# Patient Record
Sex: MALE | Race: WHITE | Employment: UNEMPLOYED | URBAN - NONMETROPOLITAN AREA
[De-identification: names, ages, dates, MRNs, and addresses within clinical notes are randomized per-mention and may not be internally consistent; named-entity substitution may affect disease eponyms.]

---

## 2017-03-13 ENCOUNTER — HOSPITAL ENCOUNTER (OUTPATIENT)
Dept: OTHER | Age: 47
Discharge: HOME | End: 2017-03-31
Attending: PREVENTIVE MEDICINE | Admitting: PREVENTIVE MEDICINE

## 2017-03-16 ENCOUNTER — HOSPITAL ENCOUNTER (OUTPATIENT)
Dept: OTHER | Age: 47
Discharge: HOME | End: 2017-03-16
Attending: FAMILY MEDICINE | Admitting: FAMILY MEDICINE

## 2017-04-01 ENCOUNTER — HOSPITAL ENCOUNTER (OUTPATIENT)
Dept: OTHER | Age: 47
Discharge: HOME | End: 2017-04-30
Attending: PREVENTIVE MEDICINE | Admitting: PREVENTIVE MEDICINE

## 2019-01-30 PROBLEM — I82.419 DVT FEMORAL (DEEP VENOUS THROMBOSIS) (HCC): Status: ACTIVE | Noted: 2019-01-30

## 2019-01-30 PROBLEM — I73.9 PAD (PERIPHERAL ARTERY DISEASE) (HCC): Status: ACTIVE | Noted: 2019-01-30

## 2019-03-08 ENCOUNTER — HOSPITAL ENCOUNTER (OUTPATIENT)
Dept: CARDIAC CATH/INVASIVE PROCEDURES | Age: 49
Discharge: HOME | End: 2019-03-08
Attending: SURGERY | Admitting: SURGERY
Payer: MEDICAID

## 2019-03-08 VITALS
DIASTOLIC BLOOD PRESSURE: 121 MMHG | WEIGHT: 147 LBS | TEMPERATURE: 98.9 F | RESPIRATION RATE: 16 BRPM | BODY MASS INDEX: 21.05 KG/M2 | OXYGEN SATURATION: 97 % | HEART RATE: 90 BPM | HEIGHT: 70 IN | SYSTOLIC BLOOD PRESSURE: 151 MMHG

## 2019-03-08 LAB
APTT: 28.7 SECONDS (ref 21.2–33)
BUN BLDV-MCNC: 9 MG/DL (ref 6–23)
CREAT SERPL-MCNC: 1 MG/DL (ref 0.9–1.3)
GFR AFRICAN AMERICAN: >60 ML/MIN/1.73M2
GFR NON-AFRICAN AMERICAN: >60 ML/MIN/1.73M2
INR BLD: 1.1 INDEX
PROTHROMBIN TIME: 12.8 SECONDS (ref 9.12–12.5)

## 2019-03-08 PROCEDURE — 82565 ASSAY OF CREATININE: CPT

## 2019-03-08 PROCEDURE — C1894 INTRO/SHEATH, NON-LASER: HCPCS

## 2019-03-08 PROCEDURE — 6370000000 HC RX 637 (ALT 250 FOR IP): Performed by: SURGERY

## 2019-03-08 PROCEDURE — 6360000004 HC RX CONTRAST MEDICATION

## 2019-03-08 PROCEDURE — 2580000003 HC RX 258: Performed by: SURGERY

## 2019-03-08 PROCEDURE — 94761 N-INVAS EAR/PLS OXIMETRY MLT: CPT

## 2019-03-08 PROCEDURE — C1887 CATHETER, GUIDING: HCPCS

## 2019-03-08 PROCEDURE — 6360000002 HC RX W HCPCS

## 2019-03-08 PROCEDURE — C1769 GUIDE WIRE: HCPCS

## 2019-03-08 PROCEDURE — 75625 CONTRAST EXAM ABDOMINL AORTA: CPT

## 2019-03-08 PROCEDURE — 36247 INS CATH ABD/L-EXT ART 3RD: CPT

## 2019-03-08 PROCEDURE — 85730 THROMBOPLASTIN TIME PARTIAL: CPT

## 2019-03-08 PROCEDURE — 85610 PROTHROMBIN TIME: CPT

## 2019-03-08 PROCEDURE — 84520 ASSAY OF UREA NITROGEN: CPT

## 2019-03-08 PROCEDURE — 2500000003 HC RX 250 WO HCPCS

## 2019-03-08 PROCEDURE — 75710 ARTERY X-RAYS ARM/LEG: CPT

## 2019-03-08 PROCEDURE — 2709999900 HC NON-CHARGEABLE SUPPLY

## 2019-03-08 RX ORDER — DIAZEPAM 5 MG/1
5 TABLET ORAL ONCE
Status: COMPLETED | OUTPATIENT
Start: 2019-03-08 | End: 2019-03-08

## 2019-03-08 RX ORDER — LISINOPRIL 5 MG/1
5 TABLET ORAL DAILY
COMMUNITY
End: 2019-07-29

## 2019-03-08 RX ORDER — DIPHENHYDRAMINE HCL 25 MG
50 TABLET ORAL ONCE
Status: COMPLETED | OUTPATIENT
Start: 2019-03-08 | End: 2019-03-08

## 2019-03-08 RX ORDER — SODIUM CHLORIDE 9 MG/ML
INJECTION, SOLUTION INTRAVENOUS CONTINUOUS
Status: DISCONTINUED | OUTPATIENT
Start: 2019-03-08 | End: 2019-03-08 | Stop reason: HOSPADM

## 2019-03-08 RX ORDER — ACETAMINOPHEN 325 MG/1
650 TABLET ORAL EVERY 4 HOURS PRN
Status: DISCONTINUED | OUTPATIENT
Start: 2019-03-08 | End: 2019-03-08 | Stop reason: HOSPADM

## 2019-03-08 RX ADMIN — SODIUM CHLORIDE: 9 INJECTION, SOLUTION INTRAVENOUS at 08:29

## 2019-03-08 RX ADMIN — DIAZEPAM 5 MG: 5 TABLET ORAL at 08:29

## 2019-03-08 RX ADMIN — DIPHENHYDRAMINE HCL 50 MG: 25 TABLET ORAL at 08:29

## 2019-07-29 ENCOUNTER — APPOINTMENT (OUTPATIENT)
Dept: GENERAL RADIOLOGY | Age: 49
End: 2019-07-29
Payer: MEDICAID

## 2019-07-29 ENCOUNTER — APPOINTMENT (OUTPATIENT)
Dept: CT IMAGING | Age: 49
End: 2019-07-29
Payer: MEDICAID

## 2019-07-29 ENCOUNTER — HOSPITAL ENCOUNTER (EMERGENCY)
Age: 49
Discharge: HOME | End: 2019-07-29
Attending: EMERGENCY MEDICINE
Payer: MEDICAID

## 2019-07-29 VITALS
HEIGHT: 70 IN | DIASTOLIC BLOOD PRESSURE: 103 MMHG | WEIGHT: 140 LBS | TEMPERATURE: 98.2 F | BODY MASS INDEX: 20.04 KG/M2 | RESPIRATION RATE: 18 BRPM | HEART RATE: 88 BPM | OXYGEN SATURATION: 92 % | SYSTOLIC BLOOD PRESSURE: 134 MMHG

## 2019-07-29 DIAGNOSIS — J90 PLEURAL EFFUSION ON LEFT: ICD-10-CM

## 2019-07-29 DIAGNOSIS — S27.321A CONTUSION OF LEFT LUNG, INITIAL ENCOUNTER: ICD-10-CM

## 2019-07-29 DIAGNOSIS — R91.1 PULMONARY NODULE: ICD-10-CM

## 2019-07-29 DIAGNOSIS — S09.90XA CLOSED HEAD INJURY, INITIAL ENCOUNTER: ICD-10-CM

## 2019-07-29 DIAGNOSIS — S22.42XA CLOSED FRACTURE OF MULTIPLE RIBS OF LEFT SIDE, INITIAL ENCOUNTER: Primary | ICD-10-CM

## 2019-07-29 LAB
ALBUMIN SERPL-MCNC: 4 GM/DL (ref 3.4–5)
ALP BLD-CCNC: 81 IU/L (ref 40–129)
ALT SERPL-CCNC: 12 U/L (ref 10–40)
ANION GAP SERPL CALCULATED.3IONS-SCNC: 10 MMOL/L (ref 4–16)
AST SERPL-CCNC: 21 IU/L (ref 15–37)
BASOPHILS ABSOLUTE: 0.1 K/CU MM
BASOPHILS RELATIVE PERCENT: 0.7 % (ref 0–1)
BILIRUB SERPL-MCNC: 0.5 MG/DL (ref 0–1)
BUN BLDV-MCNC: 10 MG/DL (ref 6–23)
CALCIUM SERPL-MCNC: 8.7 MG/DL (ref 8.3–10.6)
CHLORIDE BLD-SCNC: 99 MMOL/L (ref 99–110)
CO2: 24 MMOL/L (ref 21–32)
CREAT SERPL-MCNC: 0.8 MG/DL (ref 0.9–1.3)
DIFFERENTIAL TYPE: ABNORMAL
EOSINOPHILS ABSOLUTE: 0.2 K/CU MM
EOSINOPHILS RELATIVE PERCENT: 1.3 % (ref 0–3)
GFR AFRICAN AMERICAN: >60 ML/MIN/1.73M2
GFR NON-AFRICAN AMERICAN: >60 ML/MIN/1.73M2
GLUCOSE BLD-MCNC: 100 MG/DL (ref 70–99)
HCT VFR BLD CALC: 45.4 % (ref 42–52)
HEMOGLOBIN: 14.8 GM/DL (ref 13.5–18)
IMMATURE NEUTROPHIL %: 0.5 % (ref 0–0.43)
LYMPHOCYTES ABSOLUTE: 1.6 K/CU MM
LYMPHOCYTES RELATIVE PERCENT: 11.8 % (ref 24–44)
MCH RBC QN AUTO: 28.2 PG (ref 27–31)
MCHC RBC AUTO-ENTMCNC: 32.6 % (ref 32–36)
MCV RBC AUTO: 86.6 FL (ref 78–100)
MONOCYTES ABSOLUTE: 0.9 K/CU MM
MONOCYTES RELATIVE PERCENT: 6.7 % (ref 0–4)
NUCLEATED RBC %: 0 %
PDW BLD-RTO: 15.9 % (ref 11.7–14.9)
PLATELET # BLD: 283 K/CU MM (ref 140–440)
PMV BLD AUTO: 9.8 FL (ref 7.5–11.1)
POTASSIUM SERPL-SCNC: 4.9 MMOL/L (ref 3.5–5.1)
RBC # BLD: 5.24 M/CU MM (ref 4.6–6.2)
SEGMENTED NEUTROPHILS ABSOLUTE COUNT: 10.9 K/CU MM
SEGMENTED NEUTROPHILS RELATIVE PERCENT: 79 % (ref 36–66)
SODIUM BLD-SCNC: 133 MMOL/L (ref 135–145)
TOTAL IMMATURE NEUTOROPHIL: 0.07 K/CU MM
TOTAL NUCLEATED RBC: 0 K/CU MM
TOTAL PROTEIN: 6.5 GM/DL (ref 6.4–8.2)
WBC # BLD: 13.8 K/CU MM (ref 4–10.5)

## 2019-07-29 PROCEDURE — 6360000002 HC RX W HCPCS: Performed by: EMERGENCY MEDICINE

## 2019-07-29 PROCEDURE — 70450 CT HEAD/BRAIN W/O DYE: CPT

## 2019-07-29 PROCEDURE — 72125 CT NECK SPINE W/O DYE: CPT

## 2019-07-29 PROCEDURE — 85025 COMPLETE CBC W/AUTO DIFF WBC: CPT

## 2019-07-29 PROCEDURE — 80053 COMPREHEN METABOLIC PANEL: CPT

## 2019-07-29 PROCEDURE — 96376 TX/PRO/DX INJ SAME DRUG ADON: CPT

## 2019-07-29 PROCEDURE — 6370000000 HC RX 637 (ALT 250 FOR IP): Performed by: EMERGENCY MEDICINE

## 2019-07-29 PROCEDURE — 96374 THER/PROPH/DIAG INJ IV PUSH: CPT

## 2019-07-29 PROCEDURE — 74177 CT ABD & PELVIS W/CONTRAST: CPT

## 2019-07-29 PROCEDURE — 94761 N-INVAS EAR/PLS OXIMETRY MLT: CPT

## 2019-07-29 PROCEDURE — 94640 AIRWAY INHALATION TREATMENT: CPT

## 2019-07-29 PROCEDURE — 71046 X-RAY EXAM CHEST 2 VIEWS: CPT

## 2019-07-29 PROCEDURE — 99285 EMERGENCY DEPT VISIT HI MDM: CPT

## 2019-07-29 PROCEDURE — 6360000004 HC RX CONTRAST MEDICATION: Performed by: EMERGENCY MEDICINE

## 2019-07-29 PROCEDURE — 71260 CT THORAX DX C+: CPT

## 2019-07-29 RX ORDER — IPRATROPIUM BROMIDE AND ALBUTEROL SULFATE 2.5; .5 MG/3ML; MG/3ML
1 SOLUTION RESPIRATORY (INHALATION) ONCE
Status: COMPLETED | OUTPATIENT
Start: 2019-07-29 | End: 2019-07-29

## 2019-07-29 RX ORDER — MORPHINE SULFATE 4 MG/ML
4 INJECTION, SOLUTION INTRAMUSCULAR; INTRAVENOUS EVERY 30 MIN PRN
Status: DISCONTINUED | OUTPATIENT
Start: 2019-07-29 | End: 2019-07-29 | Stop reason: HOSPADM

## 2019-07-29 RX ADMIN — IPRATROPIUM BROMIDE AND ALBUTEROL SULFATE 1 AMPULE: .5; 3 SOLUTION RESPIRATORY (INHALATION) at 06:45

## 2019-07-29 RX ADMIN — IOPAMIDOL 80 ML: 755 INJECTION, SOLUTION INTRAVENOUS at 08:29

## 2019-07-29 RX ADMIN — MORPHINE SULFATE 4 MG: 4 INJECTION, SOLUTION INTRAMUSCULAR; INTRAVENOUS at 09:02

## 2019-07-29 RX ADMIN — MORPHINE SULFATE 4 MG: 4 INJECTION, SOLUTION INTRAMUSCULAR; INTRAVENOUS at 07:05

## 2019-07-29 ASSESSMENT — PAIN SCALES - GENERAL
PAINLEVEL_OUTOF10: 7
PAINLEVEL_OUTOF10: 10
PAINLEVEL_OUTOF10: 10
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
## 2021-04-29 ENCOUNTER — APPOINTMENT (OUTPATIENT)
Dept: ULTRASOUND IMAGING | Age: 51
DRG: 182 | End: 2021-04-29
Payer: MEDICAID

## 2021-04-29 ENCOUNTER — APPOINTMENT (OUTPATIENT)
Dept: GENERAL RADIOLOGY | Age: 51
DRG: 182 | End: 2021-04-29
Payer: MEDICAID

## 2021-04-29 ENCOUNTER — HOSPITAL ENCOUNTER (INPATIENT)
Age: 51
LOS: 2 days | Discharge: HOME | DRG: 182 | End: 2021-05-01
Attending: EMERGENCY MEDICINE | Admitting: INTERNAL MEDICINE
Payer: MEDICAID

## 2021-04-29 DIAGNOSIS — M79.89 LEG SWELLING: Primary | ICD-10-CM

## 2021-04-29 DIAGNOSIS — I82.4Y2 ACUTE DEEP VEIN THROMBOSIS (DVT) OF PROXIMAL VEIN OF LEFT LOWER EXTREMITY (HCC): ICD-10-CM

## 2021-04-29 PROBLEM — I82.402 DVT, LOWER EXTREMITY, RECURRENT, LEFT (HCC): Status: ACTIVE | Noted: 2021-04-29

## 2021-04-29 LAB
ALBUMIN SERPL-MCNC: 4.1 GM/DL (ref 3.4–5)
ALP BLD-CCNC: 116 IU/L (ref 40–128)
ALT SERPL-CCNC: 14 U/L (ref 10–40)
ANION GAP SERPL CALCULATED.3IONS-SCNC: 7 MMOL/L (ref 4–16)
APTT: 33.3 SECONDS (ref 25.1–37.1)
AST SERPL-CCNC: 20 IU/L (ref 15–37)
BASOPHILS ABSOLUTE: 0.1 K/CU MM
BASOPHILS RELATIVE PERCENT: 1 % (ref 0–1)
BILIRUB SERPL-MCNC: 0.6 MG/DL (ref 0–1)
BUN BLDV-MCNC: 7 MG/DL (ref 6–23)
CALCIUM SERPL-MCNC: 9.4 MG/DL (ref 8.3–10.6)
CHLORIDE BLD-SCNC: 96 MMOL/L (ref 99–110)
CO2: 29 MMOL/L (ref 21–32)
CREAT SERPL-MCNC: 1 MG/DL (ref 0.9–1.3)
DIFFERENTIAL TYPE: ABNORMAL
EOSINOPHILS ABSOLUTE: 0.4 K/CU MM
EOSINOPHILS RELATIVE PERCENT: 3.5 % (ref 0–3)
GFR AFRICAN AMERICAN: >60 ML/MIN/1.73M2
GFR NON-AFRICAN AMERICAN: >60 ML/MIN/1.73M2
GLUCOSE BLD-MCNC: 97 MG/DL (ref 70–99)
HCT VFR BLD CALC: 50.3 % (ref 42–52)
HEMOGLOBIN: 16.3 GM/DL (ref 13.5–18)
IMMATURE NEUTROPHIL %: 1.3 % (ref 0–0.43)
LACTATE: 1.2 MMOL/L (ref 0.4–2)
LYMPHOCYTES ABSOLUTE: 1.5 K/CU MM
LYMPHOCYTES RELATIVE PERCENT: 14 % (ref 24–44)
MCH RBC QN AUTO: 27.3 PG (ref 27–31)
MCHC RBC AUTO-ENTMCNC: 32.4 % (ref 32–36)
MCV RBC AUTO: 84.1 FL (ref 78–100)
MONOCYTES ABSOLUTE: 1.2 K/CU MM
MONOCYTES RELATIVE PERCENT: 11 % (ref 0–4)
NUCLEATED RBC %: 0 %
PDW BLD-RTO: 18.9 % (ref 11.7–14.9)
PLATELET # BLD: 426 K/CU MM (ref 140–440)
PMV BLD AUTO: 9.9 FL (ref 7.5–11.1)
POTASSIUM SERPL-SCNC: 5.5 MMOL/L (ref 3.5–5.1)
PRO-BNP: 136.5 PG/ML
RBC # BLD: 5.98 M/CU MM (ref 4.6–6.2)
SEGMENTED NEUTROPHILS ABSOLUTE COUNT: 7.5 K/CU MM
SEGMENTED NEUTROPHILS RELATIVE PERCENT: 69.2 % (ref 36–66)
SODIUM BLD-SCNC: 132 MMOL/L (ref 135–145)
TOTAL CK: 46 IU/L (ref 38–174)
TOTAL IMMATURE NEUTOROPHIL: 0.14 K/CU MM
TOTAL NUCLEATED RBC: 0 K/CU MM
TOTAL PROTEIN: 7.7 GM/DL (ref 6.4–8.2)
TSH HIGH SENSITIVITY: 1.25 UIU/ML (ref 0.27–4.2)
WBC # BLD: 10.8 K/CU MM (ref 4–10.5)

## 2021-04-29 PROCEDURE — 93971 EXTREMITY STUDY: CPT

## 2021-04-29 PROCEDURE — 85730 THROMBOPLASTIN TIME PARTIAL: CPT

## 2021-04-29 PROCEDURE — 71045 X-RAY EXAM CHEST 1 VIEW: CPT

## 2021-04-29 PROCEDURE — 82550 ASSAY OF CK (CPK): CPT

## 2021-04-29 PROCEDURE — 6370000000 HC RX 637 (ALT 250 FOR IP): Performed by: INTERNAL MEDICINE

## 2021-04-29 PROCEDURE — 6370000000 HC RX 637 (ALT 250 FOR IP): Performed by: EMERGENCY MEDICINE

## 2021-04-29 PROCEDURE — 93005 ELECTROCARDIOGRAM TRACING: CPT | Performed by: EMERGENCY MEDICINE

## 2021-04-29 PROCEDURE — 83605 ASSAY OF LACTIC ACID: CPT

## 2021-04-29 PROCEDURE — 84443 ASSAY THYROID STIM HORMONE: CPT

## 2021-04-29 PROCEDURE — 83880 ASSAY OF NATRIURETIC PEPTIDE: CPT

## 2021-04-29 PROCEDURE — 85027 COMPLETE CBC AUTOMATED: CPT

## 2021-04-29 PROCEDURE — 1200000000 HC SEMI PRIVATE

## 2021-04-29 PROCEDURE — 2580000003 HC RX 258: Performed by: INTERNAL MEDICINE

## 2021-04-29 PROCEDURE — G0378 HOSPITAL OBSERVATION PER HR: HCPCS

## 2021-04-29 PROCEDURE — 96366 THER/PROPH/DIAG IV INF ADDON: CPT

## 2021-04-29 PROCEDURE — 2580000003 HC RX 258: Performed by: EMERGENCY MEDICINE

## 2021-04-29 PROCEDURE — 85025 COMPLETE CBC W/AUTO DIFF WBC: CPT

## 2021-04-29 PROCEDURE — 6360000002 HC RX W HCPCS: Performed by: EMERGENCY MEDICINE

## 2021-04-29 PROCEDURE — 80053 COMPREHEN METABOLIC PANEL: CPT

## 2021-04-29 PROCEDURE — 96365 THER/PROPH/DIAG IV INF INIT: CPT

## 2021-04-29 PROCEDURE — 99285 EMERGENCY DEPT VISIT HI MDM: CPT

## 2021-04-29 PROCEDURE — 36415 COLL VENOUS BLD VENIPUNCTURE: CPT

## 2021-04-29 RX ORDER — OXYCODONE HYDROCHLORIDE AND ACETAMINOPHEN 5; 325 MG/1; MG/1
1 TABLET ORAL EVERY 6 HOURS PRN
Status: DISCONTINUED | OUTPATIENT
Start: 2021-04-29 | End: 2021-04-29

## 2021-04-29 RX ORDER — 0.9 % SODIUM CHLORIDE 0.9 %
1000 INTRAVENOUS SOLUTION INTRAVENOUS ONCE
Status: COMPLETED | OUTPATIENT
Start: 2021-04-29 | End: 2021-04-29

## 2021-04-29 RX ORDER — HEPARIN SODIUM 1000 [USP'U]/ML
40 INJECTION, SOLUTION INTRAVENOUS; SUBCUTANEOUS PRN
Status: DISCONTINUED | OUTPATIENT
Start: 2021-04-30 | End: 2021-05-01

## 2021-04-29 RX ORDER — SODIUM CHLORIDE 0.9 % (FLUSH) 0.9 %
5-40 SYRINGE (ML) INJECTION PRN
Status: DISCONTINUED | OUTPATIENT
Start: 2021-04-29 | End: 2021-05-01 | Stop reason: HOSPADM

## 2021-04-29 RX ORDER — OXYCODONE HYDROCHLORIDE AND ACETAMINOPHEN 5; 325 MG/1; MG/1
1 TABLET ORAL EVERY 4 HOURS PRN
Status: DISCONTINUED | OUTPATIENT
Start: 2021-04-29 | End: 2021-05-01 | Stop reason: HOSPADM

## 2021-04-29 RX ORDER — POLYETHYLENE GLYCOL 3350 17 G/17G
17 POWDER, FOR SOLUTION ORAL DAILY PRN
Status: DISCONTINUED | OUTPATIENT
Start: 2021-04-29 | End: 2021-05-01 | Stop reason: HOSPADM

## 2021-04-29 RX ORDER — PROMETHAZINE HYDROCHLORIDE 25 MG/1
12.5 TABLET ORAL EVERY 6 HOURS PRN
Status: DISCONTINUED | OUTPATIENT
Start: 2021-04-29 | End: 2021-05-01 | Stop reason: HOSPADM

## 2021-04-29 RX ORDER — HEPARIN SODIUM 10000 [USP'U]/100ML
5-30 INJECTION, SOLUTION INTRAVENOUS CONTINUOUS
Status: DISCONTINUED | OUTPATIENT
Start: 2021-04-29 | End: 2021-04-30

## 2021-04-29 RX ORDER — SODIUM CHLORIDE 0.9 % (FLUSH) 0.9 %
5-40 SYRINGE (ML) INJECTION EVERY 12 HOURS SCHEDULED
Status: DISCONTINUED | OUTPATIENT
Start: 2021-04-29 | End: 2021-05-01 | Stop reason: HOSPADM

## 2021-04-29 RX ORDER — ACETAMINOPHEN 325 MG/1
650 TABLET ORAL EVERY 6 HOURS PRN
Status: DISCONTINUED | OUTPATIENT
Start: 2021-04-29 | End: 2021-05-01 | Stop reason: HOSPADM

## 2021-04-29 RX ORDER — LISINOPRIL 5 MG/1
5 TABLET ORAL DAILY
Status: DISCONTINUED | OUTPATIENT
Start: 2021-04-29 | End: 2021-05-01 | Stop reason: HOSPADM

## 2021-04-29 RX ORDER — LISINOPRIL 5 MG/1
5 TABLET ORAL DAILY
Status: ON HOLD | COMMUNITY
End: 2021-05-01 | Stop reason: SDUPTHER

## 2021-04-29 RX ORDER — SODIUM CHLORIDE 9 MG/ML
25 INJECTION, SOLUTION INTRAVENOUS PRN
Status: DISCONTINUED | OUTPATIENT
Start: 2021-04-29 | End: 2021-05-01 | Stop reason: HOSPADM

## 2021-04-29 RX ORDER — HEPARIN SODIUM 1000 [USP'U]/ML
80 INJECTION, SOLUTION INTRAVENOUS; SUBCUTANEOUS ONCE
Status: COMPLETED | OUTPATIENT
Start: 2021-04-29 | End: 2021-04-29

## 2021-04-29 RX ORDER — ACETAMINOPHEN 650 MG/1
650 SUPPOSITORY RECTAL EVERY 6 HOURS PRN
Status: DISCONTINUED | OUTPATIENT
Start: 2021-04-29 | End: 2021-05-01 | Stop reason: HOSPADM

## 2021-04-29 RX ORDER — HYDROCODONE BITARTRATE AND ACETAMINOPHEN 5; 325 MG/1; MG/1
1 TABLET ORAL ONCE
Status: COMPLETED | OUTPATIENT
Start: 2021-04-29 | End: 2021-04-29

## 2021-04-29 RX ORDER — HEPARIN SODIUM 1000 [USP'U]/ML
80 INJECTION, SOLUTION INTRAVENOUS; SUBCUTANEOUS PRN
Status: DISCONTINUED | OUTPATIENT
Start: 2021-04-30 | End: 2021-05-01

## 2021-04-29 RX ORDER — ONDANSETRON 2 MG/ML
4 INJECTION INTRAMUSCULAR; INTRAVENOUS EVERY 6 HOURS PRN
Status: DISCONTINUED | OUTPATIENT
Start: 2021-04-29 | End: 2021-05-01 | Stop reason: HOSPADM

## 2021-04-29 RX ADMIN — HYDROCODONE BITARTRATE AND ACETAMINOPHEN 1 TABLET: 5; 325 TABLET ORAL at 18:21

## 2021-04-29 RX ADMIN — SODIUM CHLORIDE, PRESERVATIVE FREE 10 ML: 5 INJECTION INTRAVENOUS at 21:35

## 2021-04-29 RX ADMIN — SODIUM CHLORIDE 1000 ML: 9 INJECTION, SOLUTION INTRAVENOUS at 17:41

## 2021-04-29 RX ADMIN — HEPARIN SODIUM AND DEXTROSE 18 UNITS/KG/HR: 10000; 5 INJECTION INTRAVENOUS at 19:35

## 2021-04-29 RX ADMIN — LISINOPRIL 5 MG: 5 TABLET ORAL at 21:35

## 2021-04-29 RX ADMIN — HEPARIN SODIUM 5620 UNITS: 1000 INJECTION INTRAVENOUS; SUBCUTANEOUS at 19:29

## 2021-04-29 ASSESSMENT — PAIN DESCRIPTION - ORIENTATION: ORIENTATION: LEFT

## 2021-04-29 ASSESSMENT — PAIN SCALES - GENERAL: PAINLEVEL_OUTOF10: 10

## 2021-04-29 ASSESSMENT — ENCOUNTER SYMPTOMS
ALLERGIC/IMMUNOLOGIC NEGATIVE: 1
GASTROINTESTINAL NEGATIVE: 1
EYES NEGATIVE: 1
RESPIRATORY NEGATIVE: 1

## 2021-04-29 ASSESSMENT — PAIN - FUNCTIONAL ASSESSMENT: PAIN_FUNCTIONAL_ASSESSMENT: PREVENTS OR INTERFERES SOME ACTIVE ACTIVITIES AND ADLS

## 2021-04-29 ASSESSMENT — PAIN DESCRIPTION - FREQUENCY: FREQUENCY: CONTINUOUS

## 2021-04-29 ASSESSMENT — PAIN DESCRIPTION - DESCRIPTORS: DESCRIPTORS: CONSTANT;DULL;THROBBING

## 2021-04-29 NOTE — ED PROVIDER NOTES
Onset    Diabetes Father     Kidney Disease Father      Social History     Socioeconomic History    Marital status:      Spouse name: Not on file    Number of children: 1    Years of education: Not on file    Highest education level: Not on file   Occupational History    Occupation: Manger/cook   Social Needs    Financial resource strain: Not on file    Food insecurity     Worry: Not on file     Inability: Not on file   [employer] needs     Medical: Not on file     Non-medical: Not on file   Tobacco Use    Smoking status: Current Every Day Smoker     Packs/day: 1.00     Types: Cigarettes    Smokeless tobacco: Never Used    Tobacco comment: Hx of 3 PK of cigarettes daily   Substance and Sexual Activity    Alcohol use: Yes     Comment: social drinking     Drug use: No    Sexual activity: Not on file   Lifestyle    Physical activity     Days per week: Not on file     Minutes per session: Not on file    Stress: Not on file   Relationships    Social connections     Talks on phone: Not on file     Gets together: Not on file     Attends Worship service: Not on file     Active member of club or organization: Not on file     Attends meetings of clubs or organizations: Not on file     Relationship status: Not on file    Intimate partner violence     Fear of current or ex partner: Not on file     Emotionally abused: Not on file     Physically abused: Not on file     Forced sexual activity: Not on file   Other Topics Concern    Not on file   Social History Narrative    Not on file     Current Facility-Administered Medications   Medication Dose Route Frequency Provider Last Rate Last Admin    0.9 % sodium chloride bolus  1,000 mL Intravenous Once Rin Croissant, DO 1,000 mL/hr at 04/29/21 1741 1,000 mL at 04/29/21 1741    heparin (porcine) injection 5,620 Units  80 Units/kg Intravenous Once Rin Croissant, DO        [START ON 4/30/2021] heparin (porcine) injection 5,620 Units  80 Units/kg Intravenous PRN Tiburcio Yip, DO        heparin (porcine) injection 2,810 Units  40 Units/kg Intravenous PRN Tiburcio Yip, DO        heparin 25,000 units in dextrose 5% 250 mL (premix) infusion  5-30 Units/kg/hr Intravenous Continuous Tiburcio Yip, DO         No current outpatient medications on file. No Known Allergies  Current Facility-Administered Medications   Medication Dose Route Frequency Provider Last Rate Last Admin    0.9 % sodium chloride bolus  1,000 mL Intravenous Once Tiburcio Yip, DO 1,000 mL/hr at 04/29/21 1741 1,000 mL at 04/29/21 1741    heparin (porcine) injection 5,620 Units  80 Units/kg Intravenous Once Tiburcio Yip, DO        [START ON 4/30/2021] heparin (porcine) injection 5,620 Units  80 Units/kg Intravenous PRN Tiburcio Yip, DO        heparin (porcine) injection 2,810 Units  40 Units/kg Intravenous PRN Tiburcio Yip, DO        heparin 25,000 units in dextrose 5% 250 mL (premix) infusion  5-30 Units/kg/hr Intravenous Continuous Tiburcio Yip, DO         No current outpatient medications on file. Nursing Notes Reviewed    VITAL SIGNS:  ED Triage Vitals [04/29/21 1406]   Enc Vitals Group      BP (!) 147/103      Pulse 107      Resp 19      Temp 98 °F (36.7 °C)      Temp src       SpO2 100 %      Weight 155 lb (70.3 kg)      Height 5' 10\" (1.778 m)      Head Circumference       Peak Flow       Pain Score       Pain Loc       Pain Edu? Excl. in [address]? PHYSICAL EXAM:  Physical Exam  Vitals signs and nursing note reviewed. Constitutional:       General: He is not in acute distress. Appearance: Normal appearance. He is well-developed and well-groomed. He is not ill-appearing, toxic-appearing or diaphoretic. HENT:      Head: Normocephalic and atraumatic. Right Ear: External ear normal.      Left Ear: External ear normal.      Nose: No congestion or rhinorrhea. Eyes:      General: No scleral icterus.         Right eye: No activity. Coordination: Coordination is intact. Coordination normal.   Psychiatric:         Mood and Affect: Mood normal.         Behavior: Behavior normal. Behavior is cooperative. Thought Content:  Thought content normal.         Judgment: Judgment normal.           I have reviewed andinterpreted all of the currently available lab results from this visit (if applicable):    Results for orders placed or performed during the hospital encounter of 04/29/21   CBC Auto Differential   Result Value Ref Range    WBC 10.8 (H) 4.0 - 10.5 K/CU MM    RBC 5.98 4.6 - 6.2 M/CU MM    Hemoglobin 16.3 13.5 - 18.0 GM/DL    Hematocrit 50.3 42 - 52 %    MCV 84.1 78 - 100 FL    MCH 27.3 27 - 31 PG    MCHC 32.4 32.0 - 36.0 %    RDW 18.9 (H) 11.7 - 14.9 %    Platelets 468 148 - 623 K/CU MM    MPV 9.9 7.5 - 11.1 FL    Differential Type AUTOMATED DIFFERENTIAL     Segs Relative 69.2 (H) 36 - 66 %    Lymphocytes % 14.0 (L) 24 - 44 %    Monocytes % 11.0 (H) 0 - 4 %    Eosinophils % 3.5 (H) 0 - 3 %    Basophils % 1.0 0 - 1 %    Segs Absolute 7.5 K/CU MM    Lymphocytes Absolute 1.5 K/CU MM    Monocytes Absolute 1.2 K/CU MM    Eosinophils Absolute 0.4 K/CU MM    Basophils Absolute 0.1 K/CU MM    Nucleated RBC % 0.0 %    Total Nucleated RBC 0.0 K/CU MM    Total Immature Neutrophil 0.14 K/CU MM    Immature Neutrophil % 1.3 (H) 0 - 0.43 %   CMP   Result Value Ref Range    Sodium 132 (L) 135 - 145 MMOL/L    Potassium 5.5 (HH) 3.5 - 5.1 MMOL/L    Chloride 96 (L) 99 - 110 mMol/L    CO2 29 21 - 32 MMOL/L    BUN 7 6 - 23 MG/DL    CREATININE 1.0 0.9 - 1.3 MG/DL    Glucose 97 70 - 99 MG/DL    Calcium 9.4 8.3 - 10.6 MG/DL    Albumin 4.1 3.4 - 5.0 GM/DL    Total Protein 7.7 6.4 - 8.2 GM/DL    Total Bilirubin 0.6 0.0 - 1.0 MG/DL    ALT 14 10 - 40 U/L    AST 20 15 - 37 IU/L    Alkaline Phosphatase 116 40 - 128 IU/L    GFR Non-African American >60 >60 mL/min/1.73m2    GFR African American >60 >60 mL/min/1.73m2    Anion Gap 7 4 - 16   CK   Result recognition program. Efforts were made to edit the dictations but occasionally words aremis-transcribed.)    DISPOSITION REFERRAL (if applicable):  No follow-up provider specified.     DISPOSITION MEDICATIONS (if applicable):  New Prescriptions    No medications on file          Irineo Garcia, [facility],   04/29/21 182

## 2021-04-29 NOTE — H&P
HISTORY AND PHYSICAL  (Hospitalist, Internal Medicine)  IDENTIFYING INFORMATION   PATIENT:  Terrie Flores  MRN:  [id number]  ADMIT DATE: 4/29/2021      CHIEF COMPLAINT   Left lower extremity swelling and pain    HISTORY OF PRESENT ILLNESS   Terrie Flores is a 53 y.o. male with h/o recurrent DVT, h/o PE, HTN, neurofibromatosis, PVD, chronic tobacco dependence presented to ED with c/o worsening swelling, pain and redness in left lower extremity for the last 3 to 4 days. Patient reports that he had history of DVT in the same leg, had PE. Patient was on Eliquis when he was diagnosed with PE but self discontinued. Patient reported that he never saw hematology/oncology. Patient denied any chest pain, shortness of breath, palpitations. Denied any fever, chills, cough, denied any visible bleeding, denied any melena, bright red blood per rectum. At presentation patient was noted to have /103, , RR 19, temperature 98, saturating 100% on room air. Lab work significant for sodium 132, potassium 5.5, hemoglobin 16.3. Chest x-ray-bilateral midlung linear opacities. Venous Doppler-extensive occlusive thrombus left common femoral vein, greater saphenous vein and deep femoral vein, partially occluding thrombus left femoral vein extending into the popliteal vein. Patient started on heparin drip in ER.     PAST MEDICAL HISTORY PAST SURGICAL HISTORY   Hypertension, recurrent DVT, PE, PVD, neurofibromatosis, history of herpes zoster ophthalmicus    FAMILY HISTORY SOCIAL HISTORY    Reviewed and noncontributory   admits to smoking 2 to 3 cigarettes/day, quit alcohol more than a year ago, denied any illicit drug abuse   MEDICATIONS ALLERGIES    Lisinopril   no known drug allergies       PAST MEDICAL, SURGICAL, FAMILY, and SOCIAL HISTORY         Past Medical History:   Diagnosis Date    Acute pancreatitis 10/19/2012    admitted to [facility], but left AMA    Alcohol abuse     Depression     H/O cardiovascular stress test 6/18/2012, 12/27/2010 6/18/2012-Normal Myocardial Perfusion Study. Post stress myocardial perfusion images show a normal pattern of perfusion in all regions. Post stress LV is normal size and function. Normal perfusion in distribution of all coronaries. EF 65%.  H/O echocardiogram 6/21/2012 6/21/2012-LV normal size. Normal LV wall thickness. LV systolic function normal. EF=>55%. LV wall motion normal. The atrial septal defect is small.     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   Social Needs    Financial resource strain: None    Food insecurity     Worry: None     Inability: None    Transportation needs     Medical: None     Non-medical: None   Tobacco Use    Smoking status: Current Every Day Smoker     Packs/day: 1.00     Types: Cigarettes    Smokeless tobacco: Never Used    Tobacco comment: Hx of 3 PK of cigarettes daily   Substance and Sexual Activity    Alcohol use: Yes     Comment: social drinking     Drug use: No    Sexual activity: None   Lifestyle    Physical activity     Days per week: None     Minutes per session: None    Stress: None   Relationships    Social connections     Talks on phone: None     Gets together: None     Attends [employer] service: None     Active member of club or organization: None     Attends meetings of clubs or organizations: None     Relationship status: None    Intimate partner violence     Fear of current or ex partner: None     Emotionally abused: None     Physically abused: None     Forced sexual activity: None   Other Topics Concern    None   Social History Narrative    None       MEDICATIONS   Medications Prior to Admission  Not in a hospital admission. Current Medications  Current Facility-Administered Medications   Medication Dose Route Frequency Provider Last Rate Last Admin    heparin (porcine) injection 5,620 Units  80 Units/kg Intravenous Once Ashish River DO        [START ON 4/30/2021] heparin (porcine) injection 5,620 Units  80 Units/kg Intravenous PRN Ashish River DO        [START ON 4/30/2021] heparin (porcine) injection 2,810 Units  40 Units/kg Intravenous PRN Ashish River DO        heparin 25,000 units in dextrose 5% 250 mL (premix) infusion  5-30 Units/kg/hr Intravenous Continuous Kanu Crenshaw DO         No current outpatient medications on file. Allergies  No Known Allergies    REVIEW OF SYSTEMS   Within above limitations. 14 point review of systems reviewed. Pertinent positive or negative as per HPI or otherwise negative per 14 point systems review. PHYSICAL EXAM     Wt Readings from Last 3 Encounters:   04/29/21 155 lb (70.3 kg)   07/29/19 140 lb (63.5 kg)   04/03/19 143 lb 3.2 oz (65 kg)       Blood pressure (!) 147/103, pulse 107, temperature 98 °F (36.7 °C), resp. rate 19, height 5' 10\" (1.778 m), weight 155 lb (70.3 kg), SpO2 100 %. GEN  -Awake, alert, NAD.   EYES   -PERRL. HENT  -MM are moist.   RESP  -LS CTA equal bilat, no wheezes, rales or rhonchi. Symmetric chest movement. No respiratory distress noted. C/V  -S1/S2 auscultated, tachycardia without appreciable M/R/G. No JVD or carotid bruits. Peripheral pulses -difficult to palpate. No reproducible chest wall tenderness. GI  -Abdomen is soft non-distended, no significant tenderness. No masses or guarding. + BS in all quadrants. Rectal exam deferred.      -No CVA tenderness. Murphy catheter is not present. MS  -swelling LLE > RLE, left lower extremity swelling noted up to the knee, erythematous, warm to touch, tender to palpate  SKIN  -Normal coloration, warm, dry, tiny papular skin lesions throughout entire body including face, plantar surface of feet. NEURO  - Awake, alert, oriented x 3, no focal deficits. PSYC  - Appropriate affect. LABS AND IMAGING     Results for Erica Short (MRN [id number]) as of 4/29/2021 23:08   Ref.  Range 4/29/2021 17:00   Sodium Latest Ref Range: 135 - 145 MMOL/L 132 (L)   Potassium Latest Ref Range: 3.5 - 5.1 MMOL/L 5.5 (HH)   Chloride Latest Ref Range: 99 - 110 mMol/L 96 (L)   CO2 Latest Ref Range: 21 - 32 MMOL/L 29   BUN Latest Ref Range: 6 - 23 MG/DL 7   Creatinine Latest Ref Range: 0.9 - 1.3 MG/DL 1.0   Anion Gap Latest Ref Range: 4 - 16  7   GFR Non- Latest Ref Range: >60 mL/min/1.73m2 >60   GFR African American Latest Ref Range: >60 mL/min/1.73m2 >60   Lactate, ser/plas Latest Ref Range: 0.4 - 2.0 mMOL/L 1.2   Glucose Latest Ref Range: 70 - 99 MG/DL 97   Calcium Latest Ref Range: 8.3 - 10.6 MG/DL 9.4   Total Protein Latest Ref Range: 6.4 - 8.2 GM/DL 7.7   Total CK Latest Ref Range: 38 - 174 IU/L 46   Pro-BNP Latest Ref Range: <300 PG/.5   Albumin Latest Ref Range: 3.4 - 5.0 GM/DL 4.1   Alk Phos Latest Ref Range: 40 - 128 IU/L 116   ALT Latest Ref Range: 10 - 40 U/L 14   AST Latest Ref Range: 15 - 37 IU/L 20   Bilirubin Latest Ref Range: 0.0 - 1.0 MG/DL 0.6   TSH, High Sensitivity Latest Ref Range: 0.270 - 4.20 uIu/ml 1.250   WBC Latest Ref Range: 4.0 - 10.5 K/CU MM 10.8 (H)   RBC Latest Ref Range: 4.6 - 6.2 M/CU MM 5.98   Hemoglobin Quant Latest Ref Range: 13.5 - 18.0 GM/DL 16.3   Hematocrit Latest Ref Range: 42 - 52 % 50.3   MCV Latest Ref Range: 78 - 100 FL 84.1   MCH Latest Ref Range: 27 - 31 PG 27.3   MCHC Latest Ref Range: 32.0 - 36.0 % 32.4   MPV Latest Ref Range: 7.5 - 11.1 FL 9.9   RDW Latest Ref Range: 11.7 - 14.9 % 18.9 (H)   Platelet Count Latest Ref Range: 140 - 440 K/CU    Lymphocyte % Latest Ref Range: 24 - 44 % 14.0 (L)   Monocytes % Latest Ref Range: 0 - 4 % 11.0 (H)   Eosinophils % Latest Ref Range: 0 - 3 % 3.5 (H)   Basophils % Latest Ref Range: 0 - 1 % 1.0   Lymphocytes Absolute Latest Units: K/CU MM 1.5   Monocytes Absolute Latest Units: K/CU MM 1.2   Eosinophils Absolute Latest Units: K/CU MM 0.4   Basophils Absolute Latest Units: K/CU MM 0.1   Differential Type Unknown AUTOMATED DIFFERENTIAL   Segs Relative Latest Ref Range: 36 - 66 % 69.2 (H)   Segs Absolute Latest Units: K/CU MM 7.5   Nucleated RBC % Latest Units: % 0.0   Immature Neutrophil % Latest Ref Range: 0 - 0.43 % 1.3 (H)   Total Immature Neutrophil Latest Units: K/CU MM 0.14   Total Nucleated RBC Latest Units: K/CU MM 0.0   aPTT Latest Ref Range: 25.1 - 37.1 SECONDS 33.3     Recent Imaging    VL DUP LOWER EXTREMITY VENOUS LEFT [[id number]] Collected: 04/29/21 1821      Order Status: Completed Updated: 04/29/21 1826     Narrative:       EXAMINATION:   DUPLEX VENOUS ULTRASOUND OF THE LEFT LOWER EXTREMITY     4/29/2021 5:42 pm     TECHNIQUE:   Duplex ultrasound using B-mode/gray scaled imaging and Doppler spectral   analysis and color flow was obtained of the left lower extremity. COMPARISON:   None.      HISTORY:   ORDERING SYSTEM PROVIDED HISTORY: pain/swelling   TECHNOLOGIST PROVIDED HISTORY:   Reason for exam:->pain/swelling   Reason for Exam: Lt leg pain, swelling, and redness   Type of Exam: Initial     FINDINGS:   Extensive occlusive thrombus seen in the left common femoral vein, greater   saphenous vein and deep femoral vein.  In addition, there is partial   occluding thrombus throughout the left femoral vein extending into the   popliteal vein.  Calf veins are free of thrombus.      Impression:       Extensive occlusive thrombus left common femoral vein, greater saphenous vein   and deep femoral vein     Partial occluding thrombus left femoral vein extending into the popliteal vein      XR CHEST PORTABLE [[id number]] Collected: 04/29/21 1727     Order Status: Completed Updated: 04/29/21 1732     Narrative:       EXAMINATION:   ONE XRAY VIEW OF THE CHEST     4/29/2021 5:16 pm     COMPARISON:   07/29/2019 chest CT     HISTORY:   ORDERING SYSTEM PROVIDED HISTORY: leg swelling   TECHNOLOGIST PROVIDED HISTORY:   Reason for exam:->leg swelling   Reason for Exam: leg swelling     FINDINGS:   The heart size is stable.  Bilateral midlung linear opacities.  No   pneumothorax.  No pleural effusion.      Impression:       Bilateral midlung linear opacities suggesting scarring or atelectasis     Otherwise, no acute cardiopulmonary process          Relevant labs and imaging reviewed    ASSESSMENT AND PLAN     #.  DVT in left lower extremity:  -Venous Doppler-extensive occlusive thrombus left common femoral vein, greater saphenous vein and deep femoral vein, partially occluding thrombus left femoral vein extending into the popliteal vein.    -Continue heparin drip   -Hematology/oncology consultED. -2D echo ordered     #. h/o recurrent DVT  -LLE-acute DVT of left common femoral vein, popliteal vein, peroneal vein-10/2018    #. h/o PE-10/2018-acute segmental and subsegmental right lung pulmonary emboli. #. HTN-patient noncompliant with medications  -Continue lisinopril. #.  Neurofibromatosis-biopsy-proven    #. PVD  -Patient has seen Dr. Bruce Staley 4/2019-lost for follow-up    #. COPD :  -CT chest-4/22/2016-moderate upper lung predominant centrilobular and paraseptal emphysema    #. Lung mass-as per care everywhere-left upper lobe lung nodule-measuring up to 2.9 cm-lesion suspicious for primary bronchogenic carcinoma. S/p biopsy-histoplasmosis. #. Chronic tobacco dependence  -Patient reports that he cut down to 2 to 3 cigarettes/day.       DVT Prophylaxis: On heparin drip for acute DVT  GI Prophylaxis: Not indicated  Code Status: FULL.       Case d/w ED physician    Praveena Padgett MD  Hospitalist, Internal Medicine  4/29/2021 at 6:56 PM

## 2021-04-30 PROBLEM — I82.4Y2 ACUTE DEEP VEIN THROMBOSIS (DVT) OF PROXIMAL VEIN OF LEFT LOWER EXTREMITY (HCC): Status: ACTIVE | Noted: 2021-04-29

## 2021-04-30 LAB
ACTIVATED CLOTTING TIME, LOW RANGE: 256 SEC
ANION GAP SERPL CALCULATED.3IONS-SCNC: 7 MMOL/L (ref 4–16)
APTT: 29.5 SECONDS (ref 25.1–37.1)
APTT: 38.4 SECONDS (ref 25.1–37.1)
APTT: 39.1 SECONDS (ref 25.1–37.1)
BACTERIA: NEGATIVE /HPF
BASOPHILS ABSOLUTE: 0.1 K/CU MM
BASOPHILS RELATIVE PERCENT: 1.1 % (ref 0–1)
BILIRUBIN URINE: NEGATIVE MG/DL
BLOOD, URINE: NEGATIVE
BUN BLDV-MCNC: 8 MG/DL (ref 6–23)
CALCIUM SERPL-MCNC: 9.3 MG/DL (ref 8.3–10.6)
CHLORIDE BLD-SCNC: 99 MMOL/L (ref 99–110)
CLARITY: CLEAR
CO2: 27 MMOL/L (ref 21–32)
COLOR: YELLOW
CREAT SERPL-MCNC: 0.9 MG/DL (ref 0.9–1.3)
DIFFERENTIAL TYPE: ABNORMAL
EKG ATRIAL RATE: 103 BPM
EKG DIAGNOSIS: NORMAL
EKG P AXIS: 41 DEGREES
EKG P-R INTERVAL: 140 MS
EKG Q-T INTERVAL: 340 MS
EKG QRS DURATION: 92 MS
EKG QTC CALCULATION (BAZETT): 445 MS
EKG R AXIS: 8 DEGREES
EKG T AXIS: 31 DEGREES
EKG VENTRICULAR RATE: 103 BPM
EOSINOPHILS ABSOLUTE: 0.4 K/CU MM
EOSINOPHILS RELATIVE PERCENT: 4 % (ref 0–3)
GFR AFRICAN AMERICAN: >60 ML/MIN/1.73M2
GFR NON-AFRICAN AMERICAN: >60 ML/MIN/1.73M2
GLUCOSE BLD-MCNC: 98 MG/DL (ref 70–99)
GLUCOSE, URINE: NEGATIVE MG/DL
HCT VFR BLD CALC: 48.8 % (ref 42–52)
HEMOGLOBIN: 15.2 GM/DL (ref 13.5–18)
HYALINE CASTS: 0 /LPF
IMMATURE NEUTROPHIL %: 1.3 % (ref 0–0.43)
KETONES, URINE: ABNORMAL MG/DL
LEUKOCYTE ESTERASE, URINE: NEGATIVE
LV EF: 53 %
LVEF MODALITY: NORMAL
LYMPHOCYTES ABSOLUTE: 1.4 K/CU MM
LYMPHOCYTES RELATIVE PERCENT: 15.2 % (ref 24–44)
MCH RBC QN AUTO: 26.3 PG (ref 27–31)
MCHC RBC AUTO-ENTMCNC: 31.1 % (ref 32–36)
MCV RBC AUTO: 84.3 FL (ref 78–100)
MONOCYTES ABSOLUTE: 1 K/CU MM
MONOCYTES RELATIVE PERCENT: 10.9 % (ref 0–4)
MUCUS: ABNORMAL HPF
NITRITE URINE, QUANTITATIVE: NEGATIVE
NUCLEATED RBC %: 0 %
PDW BLD-RTO: 18.7 % (ref 11.7–14.9)
PH, URINE: 6 (ref 5–8)
PLATELET # BLD: 386 K/CU MM (ref 140–440)
PMV BLD AUTO: 9.6 FL (ref 7.5–11.1)
POTASSIUM SERPL-SCNC: 4.5 MMOL/L (ref 3.5–5.1)
PROTEIN UA: NEGATIVE MG/DL
RBC # BLD: 5.79 M/CU MM (ref 4.6–6.2)
RBC URINE: ABNORMAL /HPF (ref 0–3)
SARS-COV-2, NAAT: NOT DETECTED
SEGMENTED NEUTROPHILS ABSOLUTE COUNT: 6.2 K/CU MM
SEGMENTED NEUTROPHILS RELATIVE PERCENT: 67.5 % (ref 36–66)
SODIUM BLD-SCNC: 133 MMOL/L (ref 135–145)
SOURCE: NORMAL
SPECIFIC GRAVITY UA: 1.01 (ref 1–1.03)
TOTAL IMMATURE NEUTOROPHIL: 0.12 K/CU MM
TOTAL NUCLEATED RBC: 0 K/CU MM
TRICHOMONAS: ABNORMAL /HPF
UROBILINOGEN, URINE: 2 MG/DL (ref 0.2–1)
WBC # BLD: 9.2 K/CU MM (ref 4–10.5)
WBC UA: <1 /HPF (ref 0–2)

## 2021-04-30 PROCEDURE — 36012 PLACE CATHETER IN VEIN: CPT

## 2021-04-30 PROCEDURE — 80048 BASIC METABOLIC PNL TOTAL CA: CPT

## 2021-04-30 PROCEDURE — 6370000000 HC RX 637 (ALT 250 FOR IP): Performed by: PHYSICIAN ASSISTANT

## 2021-04-30 PROCEDURE — 99254 IP/OBS CNSLTJ NEW/EST MOD 60: CPT | Performed by: INTERNAL MEDICINE

## 2021-04-30 PROCEDURE — 2709999900 HC NON-CHARGEABLE SUPPLY

## 2021-04-30 PROCEDURE — 6360000004 HC RX CONTRAST MEDICATION

## 2021-04-30 PROCEDURE — 6370000000 HC RX 637 (ALT 250 FOR IP): Performed by: INTERNAL MEDICINE

## 2021-04-30 PROCEDURE — 067N3ZZ DILATION OF LEFT FEMORAL VEIN, PERCUTANEOUS APPROACH: ICD-10-PCS | Performed by: INTERNAL MEDICINE

## 2021-04-30 PROCEDURE — 37249 TRLUML BALO ANGIOP ADDL VEIN: CPT

## 2021-04-30 PROCEDURE — 85347 COAGULATION TIME ACTIVATED: CPT

## 2021-04-30 PROCEDURE — 86147 CARDIOLIPIN ANTIBODY EA IG: CPT

## 2021-04-30 PROCEDURE — 6370000000 HC RX 637 (ALT 250 FOR IP)

## 2021-04-30 PROCEDURE — 99222 1ST HOSP IP/OBS MODERATE 55: CPT | Performed by: INTERNAL MEDICINE

## 2021-04-30 PROCEDURE — 067D3ZZ DILATION OF LEFT COMMON ILIAC VEIN, PERCUTANEOUS APPROACH: ICD-10-PCS | Performed by: INTERNAL MEDICINE

## 2021-04-30 PROCEDURE — 2500000003 HC RX 250 WO HCPCS

## 2021-04-30 PROCEDURE — 6360000002 HC RX W HCPCS

## 2021-04-30 PROCEDURE — 36415 COLL VENOUS BLD VENIPUNCTURE: CPT

## 2021-04-30 PROCEDURE — 85730 THROMBOPLASTIN TIME PARTIAL: CPT

## 2021-04-30 PROCEDURE — 87635 SARS-COV-2 COVID-19 AMP PRB: CPT

## 2021-04-30 PROCEDURE — 93306 TTE W/DOPPLER COMPLETE: CPT

## 2021-04-30 PROCEDURE — 93010 ELECTROCARDIOGRAM REPORT: CPT | Performed by: INTERNAL MEDICINE

## 2021-04-30 PROCEDURE — 81241 F5 GENE: CPT

## 2021-04-30 PROCEDURE — G0378 HOSPITAL OBSERVATION PER HR: HCPCS

## 2021-04-30 PROCEDURE — 81001 URINALYSIS AUTO W/SCOPE: CPT

## 2021-04-30 PROCEDURE — 36000 PLACE NEEDLE IN VEIN: CPT

## 2021-04-30 PROCEDURE — 75822 VEIN X-RAY ARMS/LEGS: CPT

## 2021-04-30 PROCEDURE — C1725 CATH, TRANSLUMIN NON-LASER: HCPCS

## 2021-04-30 PROCEDURE — 86146 BETA-2 GLYCOPROTEIN ANTIBODY: CPT

## 2021-04-30 PROCEDURE — 37248 TRLUML BALO ANGIOP 1ST VEIN: CPT | Performed by: INTERNAL MEDICINE

## 2021-04-30 PROCEDURE — C1769 GUIDE WIRE: HCPCS

## 2021-04-30 PROCEDURE — 37248 TRLUML BALO ANGIOP 1ST VEIN: CPT

## 2021-04-30 PROCEDURE — 2140000000 HC CCU INTERMEDIATE R&B

## 2021-04-30 PROCEDURE — 96366 THER/PROPH/DIAG IV INF ADDON: CPT

## 2021-04-30 PROCEDURE — 36005 INJECTION EXT VENOGRAPHY: CPT

## 2021-04-30 PROCEDURE — 94761 N-INVAS EAR/PLS OXIMETRY MLT: CPT

## 2021-04-30 PROCEDURE — 85025 COMPLETE CBC W/AUTO DIFF WBC: CPT

## 2021-04-30 PROCEDURE — 75825 VEIN X-RAY TRUNK: CPT | Performed by: INTERNAL MEDICINE

## 2021-04-30 PROCEDURE — 36005 INJECTION EXT VENOGRAPHY: CPT | Performed by: INTERNAL MEDICINE

## 2021-04-30 PROCEDURE — C1894 INTRO/SHEATH, NON-LASER: HCPCS

## 2021-04-30 PROCEDURE — 2580000003 HC RX 258: Performed by: INTERNAL MEDICINE

## 2021-04-30 PROCEDURE — 81240 F2 GENE: CPT

## 2021-04-30 PROCEDURE — C1887 CATHETER, GUIDING: HCPCS

## 2021-04-30 RX ADMIN — OXYCODONE HYDROCHLORIDE AND ACETAMINOPHEN 1 TABLET: 5; 325 TABLET ORAL at 06:53

## 2021-04-30 RX ADMIN — SODIUM CHLORIDE, PRESERVATIVE FREE 10 ML: 5 INJECTION INTRAVENOUS at 11:20

## 2021-04-30 RX ADMIN — RIVAROXABAN 15 MG: 15 TABLET, FILM COATED ORAL at 17:56

## 2021-04-30 RX ADMIN — LISINOPRIL 5 MG: 5 TABLET ORAL at 20:13

## 2021-04-30 RX ADMIN — OXYCODONE HYDROCHLORIDE AND ACETAMINOPHEN 1 TABLET: 5; 325 TABLET ORAL at 19:57

## 2021-04-30 ASSESSMENT — PAIN DESCRIPTION - ORIENTATION: ORIENTATION: LEFT

## 2021-04-30 ASSESSMENT — PAIN DESCRIPTION - DESCRIPTORS: DESCRIPTORS: CONSTANT;DULL;THROBBING

## 2021-04-30 ASSESSMENT — PAIN - FUNCTIONAL ASSESSMENT: PAIN_FUNCTIONAL_ASSESSMENT: PREVENTS OR INTERFERES SOME ACTIVE ACTIVITIES AND ADLS

## 2021-04-30 ASSESSMENT — PAIN SCALES - GENERAL: PAINLEVEL_OUTOF10: 8

## 2021-04-30 ASSESSMENT — PAIN DESCRIPTION - FREQUENCY: FREQUENCY: CONTINUOUS

## 2021-04-30 NOTE — PROGRESS NOTES
[address] [place] [address]  HOSPITALIST PROGRESS NOTE                       Name:  Jessica Royal /Age/Sex: 1970  (53 y.o. male)   MRN & CSN:  [id number] & [id number] Admission Date/Time: 2021  4:49 PM   [facility] Attending:  Mk Arce MD                                                  HPI  Jessica Royal is a 53 y.o. male who presented with LLE edema    SUBJECTIVE  Complains of pain and swelling of LLE- with difficulty moving the leg due to swelling and pain, no shortness of breath/chest pain. 10 point review of systems reviewed and negative unless noted above. ALLERGIES: No Known Allergies    PCP: TITUS Lancaster CNP    PAST MEDICAL HISTORY, SURGICAL HISTORY, SOCIAL HISTORY and  HOME MEDICATIONS all reviewed. OBJECTIVE  Vitals:    21 1945 21 0645 21 0827   BP:  (!) 158/99 (!) 147/95    Pulse:  84 97    Resp:  18 18 20   Temp:  98 °F (36.7 °C) 98.1 °F (36.7 °C)    TempSrc:  Oral Oral    SpO2: 92% 91% 90% 91%   Weight:  166 lb 12.8 oz (75.7 kg)     Height:  5' 10\" (1.778 m)         PHYSICAL EXAM   GEN Awake male, sitting upright in bed in no apparent distress. EYES Pupils are equally round. No scleral erythema, discharge, or conjunctivitis. HENT Mucous membranes are moist. Oral pharynx without exudates, no evidence of thrush. NECK Supple, no apparent thyromegaly or masses. RESP Clear to auscultation, no wheezes, rales or rhonchi. Symmetric chest movement  CARDIO/VASC S1/S2 auscultated. Regular rate without appreciable murmurs, rubs, or gallops. No JVD or carotid bruits. Peripheral pulses equal bilaterally and palpable. Positive peripheral edema. GI Abdomen is soft without significant tenderness, masses, or guarding. Bowel sounds are normoactive. Rectal exam deferred.  No costovertebral angle tenderness. Normal appearing external genitalia. HEME/LYMPH No palpable cervical lymphadenopathy and no hepatosplenomegaly.  No petechiae or ecchymoses. MSK LLE edema and limited ROM  NEURO Cranial nerves appear grossly intact, normal speech, no lateralizing weakness. PSYCH Awake, alert, oriented x 4. Affect appropriate. INTAKE: In: 142.5 [I.V.:142.5]  Out: 650   OUTPUT: In: 142.5   Out: 650 [Urine:650]    LABS  Recent Labs     04/29/21  1700   WBC 10.8*   HGB 16.3   HCT 50.3         Recent Labs     04/29/21  1700   *   K 5.5*   CL 96*   CO2 29   BUN 7   CREATININE 1.0     Recent Labs     04/29/21  1700   AST 20   ALT 14   BILITOT 0.6   ALKPHOS 116     No results for input(s): INR in the last 72 hours. Recent Labs     04/29/21  1700   CKTOTAL 46          Abnormal labs for today      Imaging:     ECHO:    Microbiology:  Blood culture:    Urine culture:    Sputum culture:    Procedures done this admission:    MEDS  Scheduled Meds:   sodium chloride flush  5-40 mL Intravenous 2 times per day    lisinopril  5 mg Oral Daily     Continuous Infusions:   heparin (PORCINE) Infusion 18 Units/kg/hr (04/29/21 1935)    sodium chloride       PRN Meds:heparin (porcine), heparin (porcine), sodium chloride flush, sodium chloride, promethazine **OR** ondansetron, polyethylene glycol, acetaminophen **OR** acetaminophen, oxyCODONE-acetaminophen        ASSESSMENT and PLAN  Hospital Day: 2    1-Symptomatic extensive LLE DVT- in the setting of previous DVT- has not been taking eliquis which he was supposed to be on(could not get refill)- evaluation for catheter-directed thrombolysis  -on heparin drip- once thrombolysis is complete- then will witch to oral agent. Echo pending.     Other issues  -Hx of PE/DVT  -HTN  -Neurofibromatosis  -PVF  -COPD  -Lung mass- oncology consulted this hospitalization  -chronic tobacco use           Disp:     Diet DIET LOW SODIUM 2 GM;   DVT Prophylaxis [] Lovenox, []  Heparin, [] SCDs, [] Ambulation   GI Prophylaxis [] PPI,  [] H2 Blocker,  [] Carafate,  [] Diet/Tube Feeds   Code Status Full Code   Disposition Patient requires continued admission due to symptomatic DVT   CMS Level of Risk [] Low, [] Moderate,[x]  High  Patient's risk as above due to symptomatic DVT     RAÚL CHAUDHRY MD 4/30/2021 8:31 AM

## 2021-04-30 NOTE — CONSULTS
Hematology/Oncology  Consult Note      Reason for Consult:  Recurrent DVT  Requesting Physician:  Dr. Yousif Gomez:   Chief Complaint   Patient presents with    Leg Swelling     left leg swelling and pain 3 days     History Obtained From:  patient, electronic medical record    HISTORY OF PRESENT ILLNESS:      The patient is a 53 y.o. male with significant past medical history of acute pancreatitis, HTN, smoker, ETOH abuse, PAD, Femoral DVT who presents with leg swelling x 3 days. Venous doppler 4/29/21  Extensive occlusive thrombus left common femoral vein, greater saphenous vein   and deep femoral vein       Partial occluding thrombus left femoral vein extending into the popliteal vein     He was started on heparin gtt. Cardiology has evaluated, echo pending. with planned thrombolysis. He is noted to have had previous DVT and reports multiple PE in 2015. He has not followed up with refilling Eliquis. He reports long distance travel often. He denies recent injury, instrumentation, no testosterone use  He formerly was a patient of . Tong Jeffries, TITUS - CNP   Reports he no longer follows with her  He denies pervious cancer history  He does have known lung nodules since 2019- largest of which was 1.6 x 0.9; he reports he was unaware of this  He denies family history of cancer  Reports swelling to leg occurred after he got his COVID vaccine    Due to recurrent DVT we were called to evaluate    Past Medical History:        Diagnosis Date    Acute pancreatitis 10/19/2012    admitted to [facility], but left AMA    Alcohol abuse     Depression     H/O cardiovascular stress test 6/18/2012, 12/27/2010 6/18/2012-Normal Myocardial Perfusion Study. Post stress myocardial perfusion images show a normal pattern of perfusion in all regions. Post stress LV is normal size and function. Normal perfusion in distribution of all coronaries. EF 65%.     H/O echocardiogram 6/21/2012 6/21/2012-LV normal size. Normal LV wall thickness. LV systolic function normal. EF=>55%. LV wall motion normal. The atrial septal defect is small.  History of complete ECG 6/18/2012, 1/6/2012, 12/27/2011    Hx of blood clots     Hypertension     Non compliance with medical treatment      Past Surgical History:        Procedure Laterality Date    SKIN BIOPSY         Current Medications:    Current Facility-Administered Medications: heparin (porcine) injection 5,620 Units, 80 Units/kg, Intravenous, PRN  heparin (porcine) injection 2,810 Units, 40 Units/kg, Intravenous, PRN  heparin 25,000 units in dextrose 5% 250 mL (premix) infusion, 5-30 Units/kg/hr, Intravenous, Continuous  sodium chloride flush 0.9 % injection 5-40 mL, 5-40 mL, Intravenous, 2 times per day  sodium chloride flush 0.9 % injection 5-40 mL, 5-40 mL, Intravenous, PRN  0.9 % sodium chloride infusion, 25 mL, Intravenous, PRN  promethazine (PHENERGAN) tablet 12.5 mg, 12.5 mg, Oral, Q6H PRN **OR** ondansetron (ZOFRAN) injection 4 mg, 4 mg, Intravenous, Q6H PRN  polyethylene glycol (GLYCOLAX) packet 17 g, 17 g, Oral, Daily PRN  acetaminophen (TYLENOL) tablet 650 mg, 650 mg, Oral, Q6H PRN **OR** acetaminophen (TYLENOL) suppository 650 mg, 650 mg, Rectal, Q6H PRN  lisinopril (PRINIVIL;ZESTRIL) tablet 5 mg, 5 mg, Oral, Daily  oxyCODONE-acetaminophen (PERCOCET) 5-325 MG per tablet 1 tablet, 1 tablet, Oral, Q4H PRN    Allergies:  Patient has no known allergies. Social History:    TOBACCO:   reports that he has been smoking cigarettes. He has been smoking about 1.00 pack per day. He has never used smokeless tobacco.  ETOH:   reports current alcohol use. DRUGS:   reports no history of drug use.   Family History:       Problem Relation Age of Onset    Diabetes Father     Kidney Disease Father      REVIEW OF SYSTEMS:    Denies headache, dizziness, visual changes  Denies chest pain, shortness of breath  Denies abdominal pain, changes to stools  +LLE swelling, pain    PHYSICAL EXAM:      Vitals:    BP (!) 147/95   Pulse 97   Temp 98.1 °F (36.7 °C) (Oral)   Resp 18   Ht 5' 10\" (1.778 m)   Wt 166 lb 12.8 oz (75.7 kg)   SpO2 90%   BMI 23.93 kg/m²     CONSTITUTIONAL:  awake, alert, cooperative, no apparent distress, and appears stated age  EYES:  Lids and lashes normal, extra ocular muscles intact, sclera clear, conjunctiva normal  LUNGS:  No increased work of breathing, good air exchange, clear to auscultation bilaterally, no crackles or wheezing  CARDIOVASCULAR:  regular rate and rhythm, normal S1 and S2, no S3 or S4, and no murmur noted  ABDOMEN: normal bowel sounds, soft, non-distended, non-tender, no masses palpated, no hepatosplenomegally  MUSCULOSKELETAL:  LLE swelling, redness  NEUROLOGIC:  Awake, alert, oriented to name, place and time. Cranial nerves II-XII are grossly intact. SKIN:  normal skin color, texture, turgor      IMPRESSION/RECOMMENDATIONS:      Recurrent DVT- now on heparin gtt. Plan for thrombolysis. Plan to switch to oral agent after procedure. He will need indefinite anticoagulation unless there is contraindication. We will obtain hypercoag workup today. Lung nodules- plan to repeat CT chest today    Neurofibromatosis- discussed genetic counseling    We will continue to follow. He will need to follow up in office for results of hypercoag panel. He needs assistance to connect with new PCP. I saw and examined patient myself. Agreed with above. We discussed about adherence to medication. Thank you for allowing us to participate in the care of this patient.         Electronically signed by TITUS Izquierdo CNP on 4/30/2021 at 7:43 AM

## 2021-04-30 NOTE — PROGRESS NOTES
Left leg venous angioplasty performed, start xarelto 15mg po bid for 21 days today and then 20mg daily

## 2021-04-30 NOTE — PLAN OF CARE
Problem: Pain:  Goal: Pain level will decrease  Description: Pain level will decrease  Outcome: Ongoing  Goal: Control of acute pain  Description: Control of acute pain  Outcome: Ongoing  Goal: Control of chronic pain  Description: Control of chronic pain  Outcome: Ongoing     Problem: Venous Thromboembolism:  Goal: Will show no signs or symptoms of venous thromboembolism  Description: Will show no signs or symptoms of venous thromboembolism  Outcome: Ongoing  Goal: Absence of signs or symptoms of impaired coagulation  Description: Absence of signs or symptoms of impaired coagulation  Outcome: Ongoing     Problem: Safety:  Goal: Free from accidental physical injury  Description: Free from accidental physical injury  Outcome: Ongoing  Goal: Free from intentional harm  Description: Free from intentional harm  Outcome: Ongoing     Problem: Daily Care:  Goal: Daily care needs are met  Description: Daily care needs are met  Outcome: Ongoing     Problem: Discharge Planning:  Goal: Patients continuum of care needs are met  Description: Patients continuum of care needs are met  Outcome: Ongoing

## 2021-04-30 NOTE — CONSULTS
H/o PE, HTN admitted with LLE swelling with DVT  Cont anticoag  Check echo  Will FU  Full note to follow                      Name:  Bibi Tubbs /Age/Sex: 1970  (53 y.o. male)   MRN & CSN:  [id number] & [id number] Admission Date/Time: 2021  4:49 PM   Location:  [address] [place], [place] [address] PCP: Serjio New, [address] [facility] Day: 2          Referring physician:  Osvaldo Michael MD         Reason for consultation:  DVT        Thanks for referral.    Information source: patient    CC;  SWELLING LLE      HPI:   Thank you for involving me in taking  care of Bibi Tubbs who  is a 53 y. o.year  Old male  Presents with  H/o DVT, PE, PVD , htn, noncompliance now admitted with severe LLE swelling, has a DVT         Extensive occlusive thrombus left common femoral vein, greater saphenous vein   and deep femoral vein       Partial occluding thrombus left femoral vein extending into the popliteal vein                 Past medical history:    has a past medical history of Acute pancreatitis, Alcohol abuse, Depression, H/O cardiovascular stress test, H/O echocardiogram, History of complete ECG, Hx of blood clots, Hypertension, and Non compliance with medical treatment. Past surgical history:   has a past surgical history that includes skin biopsy. Social History:   reports that he has been smoking cigarettes. He has been smoking about 1.00 pack per day. He has never used smokeless tobacco. He reports current alcohol use. He reports that he does not use drugs. Family history:  family history includes Diabetes in his father; Kidney Disease in his father.     No Known Allergies    heparin (porcine) injection 5,620 Units, PRN  heparin (porcine) injection 2,810 Units, PRN  heparin 25,000 units in dextrose 5% 250 mL (premix) infusion, Continuous  sodium chloride flush 0.9 % injection 5-40 mL, 2 times per day  sodium chloride flush 0.9 % injection 5-40 mL, PRN  0.9 % sodium chloride infusion, PRN  promethazine (PHENERGAN) tablet 12.5 mg, Q6H PRN    Or  ondansetron (ZOFRAN) injection 4 mg, Q6H PRN  polyethylene glycol (GLYCOLAX) packet 17 g, Daily PRN  acetaminophen (TYLENOL) tablet 650 mg, Q6H PRN    Or  acetaminophen (TYLENOL) suppository 650 mg, Q6H PRN  lisinopril (PRINIVIL;ZESTRIL) tablet 5 mg, Daily  oxyCODONE-acetaminophen (PERCOCET) 5-325 MG per tablet 1 tablet, Q4H PRN      Current Facility-Administered Medications   Medication Dose Route Frequency Provider Last Rate Last Admin    heparin (porcine) injection 5,620 Units  80 Units/kg Intravenous PRN Merlynn Neo, DO        heparin (porcine) injection 2,810 Units  40 Units/kg Intravenous PRN Merlynn Neo, DO        heparin 25,000 units in dextrose 5% 250 mL (premix) infusion  5-30 Units/kg/hr Intravenous Continuous Merlynn Neo, DO   Stopped at 04/30/21 1117    sodium chloride flush 0.9 % injection 5-40 mL  5-40 mL Intravenous 2 times per day Tarik Barr MD   10 mL at 04/30/21 1120    sodium chloride flush 0.9 % injection 5-40 mL  5-40 mL Intravenous PRN Tarik Barr MD        0.9 % sodium chloride infusion  25 mL Intravenous PRN Tarik Barr MD        promethazine (PHENERGAN) tablet 12.5 mg  12.5 mg Oral Q6H PRN Tarik Barr MD        Or    ondansetron (ZOFRAN) injection 4 mg  4 mg Intravenous Q6H PRN Tarik Barr MD        polyethylene glycol (GLYCOLAX) packet 17 g  17 g Oral Daily PRN Tarik Barr MD        acetaminophen (TYLENOL) tablet 650 mg  650 mg Oral Q6H PRN Tarik Barr MD        Or    acetaminophen (TYLENOL) suppository 650 mg  650 mg Rectal Q6H PRN Tarik Barr MD        lisinopril (PRINIVIL;ZESTRIL) tablet 5 mg  5 mg Oral Daily Tarik Barr MD   5 mg at 04/29/21 1901    oxyCODONE-acetaminophen (PERCOCET) 5-325 MG per tablet 1 tablet  1 tablet Oral Q4H PRN Jey Martínez PA-C   1 tablet at 04/30/21 9129     Review of Systems:  All 14 systems reviewed, all negative except for  lle swelling    Physical Examination:    /67   Pulse 77   Temp 98.1 °F (36.7 °C) (Oral)   Resp 14   Ht 5' 10\" (1.778 m)   Wt 166 lb 12.8 oz (75.7 kg)   SpO2 91%   BMI 23.93 kg/m²      Wt Readings from Last 3 Encounters:   04/29/21 166 lb 12.8 oz (75.7 kg)   07/29/19 140 lb (63.5 kg)   04/03/19 143 lb 3.2 oz (65 kg)     Body mass index is 23.93 kg/m². General Appearance:  fair  Head: normocephalic     Eyes: normal, noninjected conjunctiva    ENT: normal mucosa, noninjected throat, normal     NECK: No JVP  No thyromegaly        Cardiovascular: No thrills palpated   Auscultation: Normal S1 and S2,  no murmur   carotid bruit no   Abdominal Aorta no bruit    Respiratory:    Breath sounds Clear = 0    Extremities:      LLE  Edema clubbing ,   no cyanosis    SKIN: Warm and well perfused, no pallor or cyanosis    Vascular exam:  Pedal Pulses: pakp  bilaterally        Abdomen:  No masses or tenderness. No organomegaly noted. Neurological:  Oriented to time, place, and person   No focal neurological deficit noted. Psychiatric:normal mood, no anxiety    Lab Review   Recent Labs     04/30/21  0830   WBC 9.2   HGB 15.2   HCT 48.8         Recent Labs     04/30/21  0830   *   K 4.5   CL 99   CO2 27   BUN 8   CREATININE 0.9     Recent Labs     04/29/21  1700   AST 20   ALT 14   BILITOT 0.6   ALKPHOS 116     No results for input(s): TROPONINI in the last 72 hours. Lab Results   Component Value Date    BNP 44 01/12/2013    BNP 49 10/18/2012     Lab Results   Component Value Date    INR 1.10 03/08/2019    PROTIME 12.8 (H) 03/08/2019           Assessment/Recommendations:     - DVT; anticoag,  ?  Unprovoked DVT, will need longterm anticoag and TARANGO  - Dr Melissa Salazar will schedule thrombectomy  - compliance stressed         Jimmiepreeti Araujo MD, 4/30/2021 11:54 AM

## 2021-04-30 NOTE — PROGRESS NOTES
This RN called and spoke to patient's RN for Cath procedure and needs Rapid COVID sent. Order in. Verbalized understanding.

## 2021-05-01 ENCOUNTER — APPOINTMENT (OUTPATIENT)
Dept: CT IMAGING | Age: 51
DRG: 182 | End: 2021-05-01
Payer: MEDICAID

## 2021-05-01 VITALS
HEIGHT: 70 IN | DIASTOLIC BLOOD PRESSURE: 106 MMHG | OXYGEN SATURATION: 96 % | BODY MASS INDEX: 23.88 KG/M2 | SYSTOLIC BLOOD PRESSURE: 156 MMHG | WEIGHT: 166.8 LBS | HEART RATE: 81 BPM | TEMPERATURE: 98.2 F | RESPIRATION RATE: 20 BRPM

## 2021-05-01 LAB
APTT: 35.1 SECONDS (ref 25.1–37.1)
APTT: 37.3 SECONDS (ref 25.1–37.1)
HCT VFR BLD CALC: 45.7 % (ref 42–52)
HEMOGLOBIN: 14.3 GM/DL (ref 13.5–18)
MCH RBC QN AUTO: 26.4 PG (ref 27–31)
MCHC RBC AUTO-ENTMCNC: 31.3 % (ref 32–36)
MCV RBC AUTO: 84.3 FL (ref 78–100)
PDW BLD-RTO: 18.5 % (ref 11.7–14.9)
PLATELET # BLD: 360 K/CU MM (ref 140–440)
PMV BLD AUTO: 10 FL (ref 7.5–11.1)
RBC # BLD: 5.42 M/CU MM (ref 4.6–6.2)
WBC # BLD: 10.6 K/CU MM (ref 4–10.5)

## 2021-05-01 PROCEDURE — 85730 THROMBOPLASTIN TIME PARTIAL: CPT

## 2021-05-01 PROCEDURE — 85027 COMPLETE CBC AUTOMATED: CPT

## 2021-05-01 PROCEDURE — 36415 COLL VENOUS BLD VENIPUNCTURE: CPT

## 2021-05-01 PROCEDURE — 6370000000 HC RX 637 (ALT 250 FOR IP): Performed by: PHYSICIAN ASSISTANT

## 2021-05-01 PROCEDURE — G0378 HOSPITAL OBSERVATION PER HR: HCPCS

## 2021-05-01 PROCEDURE — 6370000000 HC RX 637 (ALT 250 FOR IP): Performed by: INTERNAL MEDICINE

## 2021-05-01 PROCEDURE — 71260 CT THORAX DX C+: CPT

## 2021-05-01 PROCEDURE — 94761 N-INVAS EAR/PLS OXIMETRY MLT: CPT

## 2021-05-01 PROCEDURE — 6360000004 HC RX CONTRAST MEDICATION: Performed by: NURSE PRACTITIONER

## 2021-05-01 RX ORDER — LISINOPRIL 5 MG/1
5 TABLET ORAL DAILY
Qty: 30 TABLET | Refills: 3 | Status: SHIPPED | OUTPATIENT
Start: 2021-05-01 | End: 2021-06-21

## 2021-05-01 RX ADMIN — RIVAROXABAN 15 MG: 15 TABLET, FILM COATED ORAL at 08:01

## 2021-05-01 RX ADMIN — OXYCODONE HYDROCHLORIDE AND ACETAMINOPHEN 1 TABLET: 5; 325 TABLET ORAL at 02:36

## 2021-05-01 RX ADMIN — LISINOPRIL 5 MG: 5 TABLET ORAL at 08:01

## 2021-05-01 RX ADMIN — IOPAMIDOL 80 ML: 755 INJECTION, SOLUTION INTRAVENOUS at 10:12

## 2021-05-01 RX ADMIN — OXYCODONE HYDROCHLORIDE AND ACETAMINOPHEN 1 TABLET: 5; 325 TABLET ORAL at 08:01

## 2021-05-01 ASSESSMENT — PAIN SCALES - GENERAL
PAINLEVEL_OUTOF10: 3
PAINLEVEL_OUTOF10: 7

## 2021-05-01 NOTE — DISCHARGE SUMMARY
Discharge Summary    Name:  Patricia Charles /Age/Sex: 1970  (53 y.o. male)   MRN & CSN:  [id number] & [id number] Admission Date/Time: 2021  4:49 PM   Attending:  Estefanía Valera MD Discharging Physician: Alison Hernandes MD     HPI and Hospital Course:   Patricia Charles is a 53 y.o.  male  who presented with lower extremity edema/pain    HPI-as per H and [address] 67  1-Symptomatic extensive LLE DVT- in the setting of previous DVT- has not been taking eliquis which he was supposed to be on(could not get refill)- treated with heparin drip, seen by cardio and left leg venous angioplasty done- today feels better with no significant pain and less swelling, tells me has ambulated and started on xarelto on which he is being discharge.     Other issues  -Hx of PE/DVT  -HTN  -Neurofibromatosis  -PVF  -COPD  -Lung mass- oncology consulted this hospitalization- CT done whose results pending- to have outpatient follow up  -chronic tobacco use            The patient expressed appropriate understanding of and agreement with the discharge recommendations, medications, and plan.      Consults this admission:  IP CONSULT TO HOSPITALIST  IP CONSULT TO Sonya Phipps  IP CONSULT TO CARDIOLOGY    Discharge Instruction:   Follow up appointments:   Primary care physician:  within 2 weeks    Diet:  General/cardiac/ADA/as tolerated  Activity: {discharge activity: as tolerated  Disposition: Discharged to:   [x]Home, [][facility], []SNF, []Acute Rehab, []Hospice   Condition on discharge: Stable    Discharge Medications:      Sabine Hoover   Home Medication Instructions [id number]    Printed on:21 1045   Medication Information                      lisinopril (PRINIVIL;ZESTRIL) 5 MG tablet  Take 1 tablet by mouth daily             rivaroxaban (XARELTO) 15 MG TABS tablet  Take 1 tablet by mouth 2 times daily (with meals)             rivaroxaban (XARELTO) 20 MG TABS tablet  Take 1 tablet by mouth daily (with breakfast) Once 15mg bid for 21 days is done                 Objective Findings at Discharge:   BP (!) 156/106   Pulse 81   Temp 98.2 °F (36.8 °C) (Oral)   Resp 20   Ht 5' 10\" (1.778 m)   Wt 166 lb 12.8 oz (75.7 kg)   SpO2 96%   BMI 23.93 kg/m²            PHYSICAL EXAM   GEN Awake male, laying in bed in no apparent distress. Appears given age. EYES Pupils are equally round. No scleral discharge  HENT Atraumatic and symmetric head  NECK No apparent thyromegaly  RESP Symmetric chest movement while on room air. CARDIO/VASC Peripheral pulses equal bilaterally and palpable. No peripheral edema. GI Abdomen is not distended. Rectal exam deferred.  Murphy catheter is not present. HEME/LYMPH No petechiae or ecchymoses. MSK Spontaneous movement of BL upper extremities  SKIN Normal coloration, warm, dry. NEURO Cranial nerves appear grossly intact  PSYCH Awake, alert.     BMP/CBC  Recent Labs     04/29/21  1700 04/30/21  0830 05/01/21  0642   * 133*  --    K 5.5* 4.5  --    CL 96* 99  --    CO2 29 27  --    BUN 7 8  --    CREATININE 1.0 0.9  --    WBC 10.8* 9.2 10.6*   HCT 50.3 48.8 45.7    386 360     SIGNIFICANT IMAGING AND LABS:      Discharge Time of 33 minutes    Electronically signed by Beba Gaxiola MD on 5/1/2021 at 10:45 AM

## 2021-05-01 NOTE — PLAN OF CARE
Problem: Pain:  Goal: Pain level will decrease  Description: Pain level will decrease  5/1/2021 0806 by Pao Arana RN  Outcome: Ongoing  5/1/2021 0152 by Francisca Ambrose RN  Outcome: Ongoing  Goal: Control of acute pain  Description: Control of acute pain  5/1/2021 0806 by Pao Arana RN  Outcome: Ongoing  5/1/2021 0152 by Francisca Ambrose RN  Outcome: Ongoing  Goal: Control of chronic pain  Description: Control of chronic pain  5/1/2021 0806 by Pao Arana RN  Outcome: Ongoing  5/1/2021 0152 by Francisca Ambrose RN  Outcome: Ongoing     Problem: Venous Thromboembolism:  Goal: Will show no signs or symptoms of venous thromboembolism  Description: Will show no signs or symptoms of venous thromboembolism  5/1/2021 0806 by Pao Arana RN  Outcome: Ongoing  5/1/2021 0152 by Francisca Ambrose RN  Outcome: Ongoing  Goal: Absence of signs or symptoms of impaired coagulation  Description: Absence of signs or symptoms of impaired coagulation  5/1/2021 0806 by Pao Arana RN  Outcome: Ongoing  5/1/2021 0152 by Francisca Ambrose RN  Outcome: Ongoing     Problem: Safety:  Goal: Free from accidental physical injury  Description: Free from accidental physical injury  5/1/2021 0806 by Pao Arana RN  Outcome: Ongoing  5/1/2021 0152 by Francisca Ambrose RN  Outcome: Ongoing  Goal: Free from intentional harm  Description: Free from intentional harm  5/1/2021 0806 by Pao Arana RN  Outcome: Ongoing  5/1/2021 0152 by Francisca Ambrose RN  Outcome: Ongoing     Problem: Daily Care:  Goal: Daily care needs are met  Description: Daily care needs are met  5/1/2021 0806 by Pao Arana RN  Outcome: Ongoing  5/1/2021 0152 by Francisca Ambrose RN  Outcome: Ongoing     Problem: Discharge Planning:  Goal: Patients continuum of care needs are met  Description: Patients continuum of care needs are met  5/1/2021 0806 by Pao Arana RN  Outcome: Ongoing  5/1/2021 0152 by Francisca Ambrose RN  Outcome: Ongoing

## 2021-05-05 LAB
ANTICARDIOLIPIN IGA ANTIBODY: 2 APL (ref 0–11)
ANTICARDIOLIPIN IGG ANTIBODY: 3 GPL (ref 0–14)
BETA 2 GLYCOPROT.1 IGA AB: 6 SAU (ref 0–20)
BETA 2 GLYCOPROT.1 IGM AB: 9 SMU (ref 0–20)
BETA-2 GLYCOPROTEIN 1 IGG ANTIBODY: 0 SGU (ref 0–20)
CARDIOLIPIN AB IGM: 13 MPL (ref 0–12)

## 2021-05-08 LAB — FACTOR V LEIDEN: NEGATIVE

## 2021-05-11 LAB — PROTHROMBIN G20210A MUTATION: NEGATIVE

## 2021-05-14 ENCOUNTER — TELEPHONE (OUTPATIENT)
Dept: ONCOLOGY | Age: 51
End: 2021-05-14

## 2021-05-14 NOTE — TELEPHONE ENCOUNTER
Pt called this morning to reschedule 10 am Appt. Had strict times and days he needed. I rescheduled for 06/24 working with his guidelines. Pt. Then called at 1:15 stated he had talked to someone and he couldn't get in till the 24th. I stated that I had scheduled him and if he could take a different day I could move him sooner. Patient stated they wanted to cancel. He would not like to come here. We never helped him and only made him worse. Then hung up on me. Dr Gudelia Mathews was made aware.

## 2021-05-19 ENCOUNTER — OFFICE VISIT (OUTPATIENT)
Dept: CARDIOLOGY CLINIC | Age: 51
End: 2021-05-19
Payer: MEDICAID

## 2021-05-19 VITALS
DIASTOLIC BLOOD PRESSURE: 86 MMHG | HEIGHT: 70 IN | HEART RATE: 96 BPM | WEIGHT: 157 LBS | BODY MASS INDEX: 22.48 KG/M2 | SYSTOLIC BLOOD PRESSURE: 134 MMHG

## 2021-05-19 DIAGNOSIS — I82.412 ACUTE DEEP VEIN THROMBOSIS (DVT) OF FEMORAL VEIN OF LEFT LOWER EXTREMITY (HCC): Primary | ICD-10-CM

## 2021-05-19 PROCEDURE — 99213 OFFICE O/P EST LOW 20 MIN: CPT | Performed by: INTERNAL MEDICINE

## 2021-05-19 PROCEDURE — G8420 CALC BMI NORM PARAMETERS: HCPCS | Performed by: INTERNAL MEDICINE

## 2021-05-19 PROCEDURE — 1111F DSCHRG MED/CURRENT MED MERGE: CPT | Performed by: INTERNAL MEDICINE

## 2021-05-19 PROCEDURE — G8427 DOCREV CUR MEDS BY ELIG CLIN: HCPCS | Performed by: INTERNAL MEDICINE

## 2021-05-19 PROCEDURE — 4004F PT TOBACCO SCREEN RCVD TLK: CPT | Performed by: INTERNAL MEDICINE

## 2021-05-19 NOTE — PROGRESS NOTES
Leslie Lombardo  is a  Established patient  ,53 y.o.   male here for evaluation of the following chief complaint(s):    Here for fu on hospital visit        SUBJECTIVE/OBJECTIVE:  HPI : h/o  Dvt, pe now here  Post DVT  Intervention, has pain in franky LE    Review of Systems    LLE swelling    Vitals:    05/19/21 1632   BP: 134/86   Pulse: 96   Weight: 157 lb (71.2 kg)   Height: 5' 10\" (1.778 m)     /86   Pulse 96   Ht 5' 10\" (1.778 m)   Wt 157 lb (71.2 kg)   BMI 22.53 kg/m²   No flowsheet data found. Wt Readings from Last 3 Encounters:   05/19/21 157 lb (71.2 kg)   04/29/21 166 lb 12.8 oz (75.7 kg)   07/29/19 140 lb (63.5 kg)     Body mass index is 22.53 kg/m². Physical Exam     Neck: JVD  no    Lungs : clear    Cardio : Si and S2 audilble  no    Ext: edema  Le swelling    All pertinent data reviewed  y    Meds : reviewed   y      Tests ordered    n    ASSESSMENT/PLAN:    - dvt had intervention recently, v doppler    - pe;   Stable on anticoag    -  Hypertension: Patients blood pressure is normal. Patient is advised about low sodium diet. Present medical regimen will not be changed. An electronic signature was used to authenticate this note.     --Nupur Arroyo MD

## 2021-05-19 NOTE — LETTER
Danial Perkins  1970  [id number]    Have you had any Chest Pain that is not new? - No      Have you had any Shortness of Breath - No    Have you had any dizziness - No      Have you had any palpitations that are not new?  - No      Is the patient on any of the following medications - no      Do you have any edema - swelling in legs

## 2021-06-01 ENCOUNTER — PROCEDURE VISIT (OUTPATIENT)
Dept: CARDIOLOGY CLINIC | Age: 51
End: 2021-06-01
Payer: MEDICAID

## 2021-06-01 ENCOUNTER — TELEPHONE (OUTPATIENT)
Dept: CARDIOLOGY CLINIC | Age: 51
End: 2021-06-01

## 2021-06-01 DIAGNOSIS — I82.412 ACUTE DEEP VEIN THROMBOSIS (DVT) OF FEMORAL VEIN OF LEFT LOWER EXTREMITY (HCC): ICD-10-CM

## 2021-06-01 PROCEDURE — 93970 EXTREMITY STUDY: CPT | Performed by: INTERNAL MEDICINE

## 2021-06-10 ENCOUNTER — NURSE ONLY (OUTPATIENT)
Dept: CARDIOLOGY CLINIC | Age: 51
End: 2021-06-10
Payer: MEDICAID

## 2021-06-10 ENCOUNTER — HOSPITAL ENCOUNTER (OUTPATIENT)
Age: 51
Discharge: HOME | End: 2021-06-10
Payer: MEDICAID

## 2021-06-10 DIAGNOSIS — M79.605 PAIN IN BOTH LOWER EXTREMITIES: Primary | ICD-10-CM

## 2021-06-10 DIAGNOSIS — M79.604 PAIN IN BOTH LOWER EXTREMITIES: Primary | ICD-10-CM

## 2021-06-10 LAB
ABO/RH: NORMAL
ANION GAP SERPL CALCULATED.3IONS-SCNC: 14 MMOL/L (ref 4–16)
ANTIBODY SCREEN: NEGATIVE
BASOPHILS ABSOLUTE: 0.1 K/CU MM
BASOPHILS RELATIVE PERCENT: 1.7 % (ref 0–1)
BUN BLDV-MCNC: 5 MG/DL (ref 6–23)
CALCIUM SERPL-MCNC: 8.8 MG/DL (ref 8.3–10.6)
CHLORIDE BLD-SCNC: 101 MMOL/L (ref 99–110)
CO2: 23 MMOL/L (ref 21–32)
COMMENT: NORMAL
CREAT SERPL-MCNC: 0.8 MG/DL (ref 0.9–1.3)
DIFFERENTIAL TYPE: ABNORMAL
EOSINOPHILS ABSOLUTE: 0.3 K/CU MM
EOSINOPHILS RELATIVE PERCENT: 4.1 % (ref 0–3)
GFR AFRICAN AMERICAN: >60 ML/MIN/1.73M2
GFR NON-AFRICAN AMERICAN: >60 ML/MIN/1.73M2
GLUCOSE BLD-MCNC: 85 MG/DL (ref 70–99)
HCT VFR BLD CALC: 50.4 % (ref 42–52)
HEMOGLOBIN: 15.6 GM/DL (ref 13.5–18)
IMMATURE NEUTROPHIL %: 0.6 % (ref 0–0.43)
LYMPHOCYTES ABSOLUTE: 2.5 K/CU MM
LYMPHOCYTES RELATIVE PERCENT: 32.2 % (ref 24–44)
MCH RBC QN AUTO: 26.9 PG (ref 27–31)
MCHC RBC AUTO-ENTMCNC: 31 % (ref 32–36)
MCV RBC AUTO: 86.7 FL (ref 78–100)
MONOCYTES ABSOLUTE: 1 K/CU MM
MONOCYTES RELATIVE PERCENT: 12.2 % (ref 0–4)
NUCLEATED RBC %: 0 %
PDW BLD-RTO: 18.9 % (ref 11.7–14.9)
PLATELET # BLD: 361 K/CU MM (ref 140–440)
PMV BLD AUTO: 10.1 FL (ref 7.5–11.1)
POTASSIUM SERPL-SCNC: 4.1 MMOL/L (ref 3.5–5.1)
RBC # BLD: 5.81 M/CU MM (ref 4.6–6.2)
SEGMENTED NEUTROPHILS ABSOLUTE COUNT: 3.8 K/CU MM
SEGMENTED NEUTROPHILS RELATIVE PERCENT: 49.2 % (ref 36–66)
SODIUM BLD-SCNC: 138 MMOL/L (ref 135–145)
TOTAL IMMATURE NEUTOROPHIL: 0.05 K/CU MM
TOTAL NUCLEATED RBC: 0 K/CU MM
WBC # BLD: 7.8 K/CU MM (ref 4–10.5)

## 2021-06-10 PROCEDURE — 36415 COLL VENOUS BLD VENIPUNCTURE: CPT

## 2021-06-10 PROCEDURE — 86900 BLOOD TYPING SEROLOGIC ABO: CPT

## 2021-06-10 PROCEDURE — 86901 BLOOD TYPING SEROLOGIC RH(D): CPT

## 2021-06-10 PROCEDURE — 80048 BASIC METABOLIC PNL TOTAL CA: CPT

## 2021-06-10 PROCEDURE — 99211 OFF/OP EST MAY X REQ PHY/QHP: CPT | Performed by: INTERNAL MEDICINE

## 2021-06-10 PROCEDURE — 86850 RBC ANTIBODY SCREEN: CPT

## 2021-06-10 PROCEDURE — 85025 COMPLETE CBC W/AUTO DIFF WBC: CPT

## 2021-06-10 NOTE — PROGRESS NOTES
Patient here in office & educated on Peripheral angiogram with possible thrombolysis for Dx:  Abnormal Venous Doppler, scheduled for 6/15/21@ 11:00, w/arrival @ 9:00, @ [facility]. Risks explained; & consents signed. Pre-admission orders given to pt for labs & CXR, which are due 6/10/21 @ 0380 [facility]. Instructions given to pt to: enio MASSEY after midnight the night before procedure. Patient to call [facility] @ [phone] to pre-register. May take morning meds the morning of procedure. Patient was notified that procedure could be delayed due to an emergency. Patient voiced understanding. Copies of consent, pre-testing orders, & info. sheet scanned into media. When signing consent forms for procedure patient was asked if he wanted to be resuscitated if his heart stopped or he stopped breathing. Patient stated \"No\". I asked him if he had a DNR order and he stated he did not. I advised him that he would need to get one from his PCP. He stated he does not have a PCP. I advised him that if he does not have a DNR order that the staff will have to resuscitate him. He stated he did not want it. I advised him to let the staff at the hospital and Dr. Marlo Kanner know this information when he arrives. Patient acknowledged understanding.

## 2021-06-14 ENCOUNTER — TELEPHONE (OUTPATIENT)
Dept: CARDIOLOGY CLINIC | Age: 51
End: 2021-06-14

## 2021-06-15 ENCOUNTER — HOSPITAL ENCOUNTER (OUTPATIENT)
Dept: CARDIAC CATH/INVASIVE PROCEDURES | Age: 51
Discharge: HOME | End: 2021-06-15
Attending: INTERNAL MEDICINE | Admitting: INTERNAL MEDICINE
Payer: MEDICAID

## 2021-06-15 VITALS
HEIGHT: 70 IN | RESPIRATION RATE: 18 BRPM | WEIGHT: 157 LBS | OXYGEN SATURATION: 94 % | HEART RATE: 76 BPM | BODY MASS INDEX: 22.48 KG/M2 | TEMPERATURE: 98.5 F | SYSTOLIC BLOOD PRESSURE: 153 MMHG | DIASTOLIC BLOOD PRESSURE: 95 MMHG

## 2021-06-15 PROBLEM — I82.402 DEEP VEIN THROMBOSIS (DVT) OF LEFT LOWER EXTREMITY (HCC): Status: ACTIVE | Noted: 2019-01-30

## 2021-06-15 PROCEDURE — C1769 GUIDE WIRE: HCPCS

## 2021-06-15 PROCEDURE — 76499 UNLISTED DX RADIOGRAPHIC PX: CPT

## 2021-06-15 PROCEDURE — 76937 US GUIDE VASCULAR ACCESS: CPT | Performed by: INTERNAL MEDICINE

## 2021-06-15 PROCEDURE — 2500000003 HC RX 250 WO HCPCS

## 2021-06-15 PROCEDURE — 6360000002 HC RX W HCPCS

## 2021-06-15 PROCEDURE — 2709999900 HC NON-CHARGEABLE SUPPLY

## 2021-06-15 PROCEDURE — 36005 INJECTION EXT VENOGRAPHY: CPT | Performed by: INTERNAL MEDICINE

## 2021-06-15 PROCEDURE — 6370000000 HC RX 637 (ALT 250 FOR IP): Performed by: INTERNAL MEDICINE

## 2021-06-15 PROCEDURE — 36011 PLACE CATHETER IN VEIN: CPT

## 2021-06-15 PROCEDURE — 2580000003 HC RX 258: Performed by: INTERNAL MEDICINE

## 2021-06-15 PROCEDURE — 36005 INJECTION EXT VENOGRAPHY: CPT

## 2021-06-15 PROCEDURE — 75820 VEIN X-RAY ARM/LEG: CPT

## 2021-06-15 PROCEDURE — 75820 VEIN X-RAY ARM/LEG: CPT | Performed by: INTERNAL MEDICINE

## 2021-06-15 PROCEDURE — 6360000004 HC RX CONTRAST MEDICATION

## 2021-06-15 PROCEDURE — C1894 INTRO/SHEATH, NON-LASER: HCPCS

## 2021-06-15 RX ORDER — DIAZEPAM 5 MG/1
5 TABLET ORAL
Status: COMPLETED | OUTPATIENT
Start: 2021-06-15 | End: 2021-06-15

## 2021-06-15 RX ORDER — DIPHENHYDRAMINE HCL 25 MG
25 TABLET ORAL
Status: COMPLETED | OUTPATIENT
Start: 2021-06-15 | End: 2021-06-15

## 2021-06-15 RX ORDER — SODIUM CHLORIDE 9 MG/ML
INJECTION, SOLUTION INTRAVENOUS CONTINUOUS
Status: DISCONTINUED | OUTPATIENT
Start: 2021-06-15 | End: 2021-06-15 | Stop reason: HOSPADM

## 2021-06-15 RX ADMIN — SODIUM CHLORIDE: 9 INJECTION, SOLUTION INTRAVENOUS at 09:36

## 2021-06-15 RX ADMIN — DIPHENHYDRAMINE HYDROCHLORIDE 25 MG: 25 TABLET ORAL at 09:36

## 2021-06-15 RX ADMIN — DIAZEPAM 5 MG: 5 TABLET ORAL at 09:36

## 2021-06-17 ENCOUNTER — TELEPHONE (OUTPATIENT)
Dept: CARDIOLOGY CLINIC | Age: 51
End: 2021-06-17

## 2021-06-17 NOTE — TELEPHONE ENCOUNTER
Left message on patients voicemail to call office with reminder to continue Uksh Aidan and compression stockings and call office if any problems. Ceci Mckeon

## 2021-06-21 ENCOUNTER — OFFICE VISIT (OUTPATIENT)
Dept: CARDIOLOGY CLINIC | Age: 51
End: 2021-06-21
Payer: MEDICAID

## 2021-06-21 VITALS
DIASTOLIC BLOOD PRESSURE: 114 MMHG | BODY MASS INDEX: 22.99 KG/M2 | WEIGHT: 160.6 LBS | HEIGHT: 70 IN | SYSTOLIC BLOOD PRESSURE: 158 MMHG | HEART RATE: 88 BPM

## 2021-06-21 DIAGNOSIS — I82.412 DEEP VEIN THROMBOSIS (DVT) OF FEMORAL VEIN OF LEFT LOWER EXTREMITY, UNSPECIFIED CHRONICITY (HCC): Primary | ICD-10-CM

## 2021-06-21 DIAGNOSIS — I73.9 PAD (PERIPHERAL ARTERY DISEASE) (HCC): ICD-10-CM

## 2021-06-21 DIAGNOSIS — Z72.0 TOBACCO ABUSE: ICD-10-CM

## 2021-06-21 DIAGNOSIS — I10 HYPERTENSION, UNSPECIFIED TYPE: ICD-10-CM

## 2021-06-21 PROCEDURE — 4004F PT TOBACCO SCREEN RCVD TLK: CPT | Performed by: NURSE PRACTITIONER

## 2021-06-21 PROCEDURE — 99214 OFFICE O/P EST MOD 30 MIN: CPT | Performed by: NURSE PRACTITIONER

## 2021-06-21 PROCEDURE — G8420 CALC BMI NORM PARAMETERS: HCPCS | Performed by: NURSE PRACTITIONER

## 2021-06-21 PROCEDURE — G8427 DOCREV CUR MEDS BY ELIG CLIN: HCPCS | Performed by: NURSE PRACTITIONER

## 2021-06-21 RX ORDER — LISINOPRIL 10 MG/1
10 TABLET ORAL DAILY
Qty: 30 TABLET | Refills: 5 | Status: ON HOLD | OUTPATIENT
Start: 2021-06-21 | End: 2021-09-17 | Stop reason: HOSPADM

## 2021-06-21 ASSESSMENT — ENCOUNTER SYMPTOMS
SHORTNESS OF BREATH: 0
ORTHOPNEA: 0

## 2021-06-21 NOTE — PROGRESS NOTES
6/21/2021  Primary cardiologist: Dr. James Carroll  is an established 53 y.o.  male here for follow-up on recent peripheral study   H/o DVT- PE-  PAD-HTN       SUBJECTIVE/OBJECTIVE:  HPI :    Gracie Kim reports he has pain in the right leg with walking. He describes it as a burning sensation. The pain to the left leg has improved. He reports the edema to the left leg has also improved. He continues to smoke. Review of Systems   Constitutional: Negative for diaphoresis and malaise/fatigue. Cardiovascular: Positive for claudication and leg swelling. Negative for chest pain, dyspnea on exertion, irregular heartbeat, near-syncope, orthopnea, palpitations and paroxysmal nocturnal dyspnea. Respiratory: Negative for shortness of breath. Neurological: Negative for dizziness and light-headedness. Vitals:    06/21/21 1339 06/21/21 1344   BP: (!) 158/110 (!) 158/114   Site: Left Upper Arm Left Upper Arm   Position: Sitting Sitting   Cuff Size: Medium Adult Medium Adult   Pulse: 88    Weight: 160 lb 9.6 oz (72.8 kg)    Height: 5' 10\" (1.778 m)      No flowsheet data found. Wt Readings from Last 3 Encounters:   06/21/21 160 lb 9.6 oz (72.8 kg)   06/15/21 157 lb (71.2 kg)   05/19/21 157 lb (71.2 kg)     Body mass index is 23.04 kg/m². Physical Exam  Constitutional:       Appearance: Normal appearance. HENT:      Head: Normocephalic and atraumatic. Eyes:      Extraocular Movements: Extraocular movements intact. Cardiovascular:      Rate and Rhythm: Normal rate and regular rhythm. Pulses:           Dorsalis pedis pulses are detected w/ Doppler on the right side. Posterior tibial pulses are detected w/ Doppler on the right side. Pulmonary:      Effort: Pulmonary effort is normal.      Breath sounds: Normal breath sounds. Abdominal:      General: There is no distension. Palpations: Abdomen is soft. Tenderness: There is no abdominal tenderness.    Musculoskeletal:         General:

## 2021-06-21 NOTE — PATIENT INSTRUCTIONS
**It is YOUR responsibilty to bring medication bottles and/or updated medication list to [address] [place] [address]. This will allow us to better serve you and all your healthcare needs**      Please be informed that if you contact our office outside of normal business hours the physician on call cannot help with any scheduling or rescheduling issues, procedure instruction questions or any type of medication issue. We advise you for any urgent/emergency that you go to the nearest emergency room!     PLEASE CALL OUR OFFICE DURING NORMAL BUSINESS HOURS    Monday - Friday   8 am to 5 pm    [place]: [address]: [phone]    [place]:  [phone]

## 2021-06-22 NOTE — H&P
Chief complaints; leg swelling  History of present illness:Red is a 53 y. o.year old who  presents with leg swellingg    Blood pressure, cholesterol, blood glucose and weight are well controlled. Past medical history:    has a past medical history of Acute pancreatitis, Alcohol abuse, Depression, H/O angiography, H/O cardiovascular stress test, H/O echocardiogram, History of complete ECG, Hx of blood clots, Hypertension, and Non compliance with medical treatment. Past surgical history:   has a past surgical history that includes skin biopsy and Cardiac catheterization (06/15/2021). Social History:   reports that he has been smoking cigarettes. He has been smoking about 0.50 packs per day. He has never used smokeless tobacco. He reports current alcohol use. He reports that he does not use drugs. Family history:   no family history of CAD, STROKE of DM    No Known Allergies    No current facility-administered medications for this encounter. No current facility-administered medications for this encounter.      Current Outpatient Medications   Medication Sig Dispense Refill    lisinopril (PRINIVIL;ZESTRIL) 10 MG tablet Take 1 tablet by mouth daily 30 tablet 5    rivaroxaban (XARELTO) 20 MG TABS tablet Take 1 tablet by mouth daily (with breakfast) Once 15mg bid for 21 days is done 90 tablet 1     Review of Systems:   · Constitutional: No Fever or Weight Loss   · Eyes: No Decreased Vision  · ENT: No Headaches, Hearing Loss or Vertigo  · Cardiovascular: No chest pain, dyspnea on exertion, palpitations or loss of consciousness  · Respiratory: No cough or wheezing    · Gastrointestinal: No abdominal pain, appetite loss, blood in stools, constipation, diarrhea or heartburn  · Genitourinary: No dysuria, trouble voiding, or hematuria  · Musculoskeletal:  No gait disturbance, weakness or joint complaints  · Integumentary: No rash or pruritis  · Neurological: No TIA or stroke symptoms  · Psychiatric: No anxiety or depression  · Endocrine: No malaise, fatigue or temperature intolerance  · Hematologic/Lymphatic: No bleeding problems, blood clots or swollen lymph nodes  · Allergic/Immunologic: No nasal congestion or hives  All systems negative except as marked. ·   ·      Physical Examination:    Vitals:    06/15/21 1322   BP: (!) 153/95   Pulse: 76   Resp:    Temp:    SpO2: 94%      Wt Readings from Last 3 Encounters:   06/21/21 160 lb 9.6 oz (72.8 kg)   06/15/21 157 lb (71.2 kg)   05/19/21 157 lb (71.2 kg)     Body mass index is 22.53 kg/m². General Appearance:  No distress, conversant    Constitutional:  Well developed, Well nourished, No acute distress, Non-toxic appearance. HENT:  Normocephalic, Atraumatic, Bilateral external ears normal, Oropharynx moist, No oral exudates, Nose normal. Neck- Normal range of motion, No tenderness, Supple, No stridor,no apical-carotid delay, no carotid bruit  Eyes:  PERRL, EOMI, Conjunctiva normal, No discharge. Respiratory:  Normal breath sounds, No respiratory distress, No wheezing, No chest tenderness. ,no use of accessory muscles, diaphragm movement is normal  Cardiovascular: (PMI) apex non displaced,no lifts no thrills, no s3,no s4, Normal heart rate, Normal rhythm, No murmurs, No rubs, No gallops. Carotid arteries pulse and amplitude are normal no bruit, no abdominal bruit noted ( normal abdominal aorta ausculation), femoral arteries pulse and amplitude are normal no bruit, pedal pulses are normal  GI:  Bowel sounds normal, Soft, No tenderness, No masses, No pulsatile masses, no hepatosplenomegally, no bruits  : External genitalia appear normal, No masses or lesions. No discharge. No CVA tenderness. Musculoskeletal:  Intact distal pulses, + edema, No tenderness, No cyanosis, No clubbing. Good range of motion in all major joints. No tenderness to palpation or major deformities noted. Back- No tenderness. Integument:  Warm, Dry, No erythema, No rash.    Skin: no rash, no ulcers  Lymphatic:  No lymphadenopathy noted. Neurologic:  Alert & oriented x 3, Normal motor function, Normal sensory function, No focal deficits noted. Psychiatric:  Affect normal, Judgment normal, Mood normal.   Lab Review   No results for input(s): WBC, HGB, HCT, PLT in the last 72 hours. No results for input(s): NA, K, CL, CO2, PHOS, BUN, CREATININE in the last 72 hours. Invalid input(s): CA  No results for input(s): AST, ALT, ALB, BILIDIR, BILITOT, ALKPHOS in the last 72 hours. No results for input(s): TROPONINI in the last 72 hours. Lab Results   Component Value Date    BNP 44 01/12/2013    BNP 49 10/18/2012     Lab Results   Component Value Date    INR 1.10 03/08/2019    PROTIME 12.8 (H) 03/08/2019         EKG:    Chest Xray:    ECHO:  Labs, echo, meds reviewed  Assessment: 53 y. o.year old with PMH of  has a past medical history of Acute pancreatitis, Alcohol abuse, Depression, H/O angiography, H/O cardiovascular stress test, H/O echocardiogram, History of complete ECG, Hx of blood clots, Hypertension, and Non compliance with medical treatment. Recommendations:    1. Leg swelling: abnormal venous doppler, venogram arrhanged  2. Health maintenance: exerise and diet  All labs, medications and tests reviewed, continue all other medications of all above medical condition listed as is.          Tyra Hayward MD, 6/22/2021 2:48 PM

## 2021-06-23 ENCOUNTER — TELEPHONE (OUTPATIENT)
Dept: CARDIOLOGY CLINIC | Age: 51
End: 2021-06-23

## 2021-06-23 NOTE — TELEPHONE ENCOUNTER
Called and informed patient the date and time of CTA ABD Pelvis W WO Contrast at the [facility] on Friday 6/25 at 1:00.

## 2021-06-25 ENCOUNTER — TELEPHONE (OUTPATIENT)
Dept: CARDIOLOGY CLINIC | Age: 51
End: 2021-06-25

## 2021-06-25 ENCOUNTER — HOSPITAL ENCOUNTER (OUTPATIENT)
Dept: CT IMAGING | Age: 51
Discharge: HOME | End: 2021-06-25
Payer: MEDICAID

## 2021-06-25 DIAGNOSIS — I73.9 CLAUDICATION (HCC): ICD-10-CM

## 2021-06-25 DIAGNOSIS — I73.9 PAD (PERIPHERAL ARTERY DISEASE) (HCC): ICD-10-CM

## 2021-06-25 DIAGNOSIS — I73.9 CLAUDICATION (HCC): Primary | ICD-10-CM

## 2021-06-25 RX ORDER — SODIUM CHLORIDE 0.9 % (FLUSH) 0.9 %
5-40 SYRINGE (ML) INJECTION PRN
Status: DISCONTINUED | OUTPATIENT
Start: 2021-06-25 | End: 2021-06-26 | Stop reason: HOSPADM

## 2021-06-25 NOTE — TELEPHONE ENCOUNTER
Patient is going to leave the [facility], please call him back to let him know when the corrected order is available at ph# [phone].

## 2021-06-28 ENCOUNTER — HOSPITAL ENCOUNTER (OUTPATIENT)
Dept: CT IMAGING | Age: 51
Discharge: HOME | End: 2021-06-28
Payer: MEDICAID

## 2021-06-28 PROCEDURE — 6360000004 HC RX CONTRAST MEDICATION: Performed by: SURGERY

## 2021-06-28 PROCEDURE — 75635 CT ANGIO ABDOMINAL ARTERIES: CPT

## 2021-06-28 RX ORDER — SODIUM CHLORIDE 0.9 % (FLUSH) 0.9 %
5-40 SYRINGE (ML) INJECTION PRN
Status: DISCONTINUED | OUTPATIENT
Start: 2021-06-28 | End: 2021-06-29 | Stop reason: HOSPADM

## 2021-06-28 RX ADMIN — IOPAMIDOL 95 ML: 755 INJECTION, SOLUTION INTRAVENOUS at 11:30

## 2021-06-30 ENCOUNTER — TELEPHONE (OUTPATIENT)
Dept: CARDIOLOGY CLINIC | Age: 51
End: 2021-06-30

## 2021-06-30 NOTE — TELEPHONE ENCOUNTER
I spoke with patient who agreed to have peripheral angiogram on 7/8/21 @ 7:00 and Arrive at 5:45.   He will come to sign consent forms on 7/6/21 @ 1:00

## 2021-06-30 NOTE — TELEPHONE ENCOUNTER
Appointment set up with Dr. Bryce Ramirez for ABD CTA results per Dr. Lisbet Su.      Rigoberto Lozano, TITUS - RAMSES [place] MA  Please refer to Dr Brcye Ramirez

## 2021-07-06 ENCOUNTER — NURSE ONLY (OUTPATIENT)
Dept: CARDIOLOGY CLINIC | Age: 51
End: 2021-07-06
Payer: MEDICAID

## 2021-07-06 PROCEDURE — 99211 OFF/OP EST MAY X REQ PHY/QHP: CPT | Performed by: INTERNAL MEDICINE

## 2021-07-06 NOTE — PROGRESS NOTES
[facility] office & [facility], scheduled for 7/8/21 @ 7, w/arrival @ 545, @ [facility]. Risks explained; & consents signed. Instructions given to pt to: enio MASSEY after midnight the night before procedure. Patient to call hospital @ [phone] to pre-register. May take morning meds the morning of procedure. Patient was notified that procedure could be delayed due to an emergency. Patient voiced understanding. Copies of consent & info. sheet scanned into media.

## 2021-07-07 ENCOUNTER — TELEPHONE (OUTPATIENT)
Dept: CARDIOLOGY CLINIC | Age: 51
End: 2021-07-07

## 2021-07-07 NOTE — TELEPHONE ENCOUNTER
Reminder call given. Patient having second thoughts about having procedure. I spoke with patient at length and advised him to talk with Dr. Ml Wright at the hospital before the procedure.  notified through perfect serve.

## 2021-09-13 ENCOUNTER — APPOINTMENT (OUTPATIENT)
Dept: GENERAL RADIOLOGY | Age: 51
DRG: 121 | End: 2021-09-13
Payer: MEDICAID

## 2021-09-13 ENCOUNTER — APPOINTMENT (OUTPATIENT)
Dept: CT IMAGING | Age: 51
DRG: 121 | End: 2021-09-13
Payer: MEDICAID

## 2021-09-13 ENCOUNTER — HOSPITAL ENCOUNTER (INPATIENT)
Age: 51
LOS: 4 days | Discharge: LEFT BEFORE DISCHARGE | DRG: 121 | End: 2021-09-17
Attending: EMERGENCY MEDICINE | Admitting: INTERNAL MEDICINE
Payer: MEDICAID

## 2021-09-13 DIAGNOSIS — F15.10 METHAMPHETAMINE ABUSE (HCC): ICD-10-CM

## 2021-09-13 DIAGNOSIS — R06.02 SHORTNESS OF BREATH: ICD-10-CM

## 2021-09-13 DIAGNOSIS — R91.8 OPACITIES OF BOTH LUNGS PRESENT ON CHEST X-RAY: ICD-10-CM

## 2021-09-13 DIAGNOSIS — I82.422 ACUTE DEEP VEIN THROMBOSIS (DVT) OF ILIAC VEIN OF LEFT LOWER EXTREMITY (HCC): ICD-10-CM

## 2021-09-13 DIAGNOSIS — R77.8 ELEVATED TROPONIN: Primary | ICD-10-CM

## 2021-09-13 DIAGNOSIS — I26.02 ACUTE SADDLE PULMONARY EMBOLISM WITH ACUTE COR PULMONALE (HCC): ICD-10-CM

## 2021-09-13 DIAGNOSIS — F10.10 ALCOHOL ABUSE: ICD-10-CM

## 2021-09-13 DIAGNOSIS — F14.10 NONDEPENDENT COCAINE ABUSE (HCC): ICD-10-CM

## 2021-09-13 DIAGNOSIS — F12.10 MILD TETRAHYDROCANNABINOL (THC) ABUSE: ICD-10-CM

## 2021-09-13 DIAGNOSIS — R79.89 ELEVATED BRAIN NATRIURETIC PEPTIDE (BNP) LEVEL: ICD-10-CM

## 2021-09-13 PROBLEM — I26.92 ACUTE SADDLE PULMONARY EMBOLISM (HCC): Status: ACTIVE | Noted: 2021-09-13

## 2021-09-13 LAB
ALBUMIN SERPL-MCNC: 4 GM/DL (ref 3.4–5)
ALCOHOL SCREEN SERUM: <0.01 %WT/VOL
ALP BLD-CCNC: 134 IU/L (ref 40–129)
ALT SERPL-CCNC: 7 U/L (ref 10–40)
AMPHETAMINES: ABNORMAL
ANION GAP SERPL CALCULATED.3IONS-SCNC: 15 MMOL/L (ref 4–16)
ANISOCYTOSIS: ABNORMAL
APTT: 30.4 SECONDS (ref 25.1–37.1)
AST SERPL-CCNC: 15 IU/L (ref 15–37)
BACTERIA: ABNORMAL /HPF
BANDED NEUTROPHILS ABSOLUTE COUNT: 0.9 K/CU MM
BANDED NEUTROPHILS RELATIVE PERCENT: 4 % (ref 5–11)
BARBITURATE SCREEN URINE: NEGATIVE
BASOPHILS ABSOLUTE: 0.2 K/CU MM
BASOPHILS RELATIVE PERCENT: 1 % (ref 0–1)
BENZODIAZEPINE SCREEN, URINE: NEGATIVE
BILIRUB SERPL-MCNC: 1.1 MG/DL (ref 0–1)
BILIRUBIN URINE: ABNORMAL MG/DL
BLOOD, URINE: NEGATIVE
BUN BLDV-MCNC: 13 MG/DL (ref 6–23)
CALCIUM SERPL-MCNC: 10.1 MG/DL (ref 8.3–10.6)
CANNABINOID SCREEN URINE: ABNORMAL
CAST TYPE: ABNORMAL /HPF
CHLORIDE BLD-SCNC: 96 MMOL/L (ref 99–110)
CLARITY: ABNORMAL
CO2: 24 MMOL/L (ref 21–32)
COCAINE METABOLITE: ABNORMAL
COLOR: ABNORMAL
COMMENT UA: ABNORMAL
CREAT SERPL-MCNC: 1.1 MG/DL (ref 0.9–1.3)
CRYSTAL TYPE: ABNORMAL /HPF
DIFFERENTIAL TYPE: ABNORMAL
EKG ATRIAL RATE: 116 BPM
EKG DIAGNOSIS: NORMAL
EKG P AXIS: 63 DEGREES
EKG P-R INTERVAL: 134 MS
EKG Q-T INTERVAL: 356 MS
EKG QRS DURATION: 90 MS
EKG QTC CALCULATION (BAZETT): 494 MS
EKG R AXIS: 56 DEGREES
EKG T AXIS: 21 DEGREES
EKG VENTRICULAR RATE: 116 BPM
EOSINOPHILS ABSOLUTE: 0.5 K/CU MM
EOSINOPHILS RELATIVE PERCENT: 2 % (ref 0–3)
EPITHELIAL CELLS, UA: ABNORMAL /HPF
GFR AFRICAN AMERICAN: >60 ML/MIN/1.73M2
GFR NON-AFRICAN AMERICAN: >60 ML/MIN/1.73M2
GLUCOSE BLD-MCNC: 116 MG/DL (ref 70–99)
GLUCOSE, URINE: NEGATIVE MG/DL
HCT VFR BLD CALC: 56.4 % (ref 42–52)
HEMOGLOBIN: 18.4 GM/DL (ref 13.5–18)
ICTOTEST: POSITIVE
INR BLD: 1.12 INDEX
KETONES, URINE: 80 MG/DL
LACTATE: 1.3 MMOL/L (ref 0.4–2)
LEUKOCYTE ESTERASE, URINE: NEGATIVE
LIPASE: 8 IU/L (ref 13–60)
LYMPHOCYTES ABSOLUTE: 1.6 K/CU MM
LYMPHOCYTES RELATIVE PERCENT: 7 % (ref 24–44)
MAGNESIUM: 2.1 MG/DL (ref 1.8–2.4)
MCH RBC QN AUTO: 29 PG (ref 27–31)
MCHC RBC AUTO-ENTMCNC: 32.6 % (ref 32–36)
MCV RBC AUTO: 89 FL (ref 78–100)
METAMYELOCYTES ABSOLUTE COUNT: 0.23 K/CU MM
METAMYELOCYTES PERCENT: 1 %
MONOCYTES ABSOLUTE: 2.3 K/CU MM
MONOCYTES RELATIVE PERCENT: 10 % (ref 0–4)
MYELOCYTE PERCENT: 2 %
MYELOCYTES ABSOLUTE COUNT: 0.45 K/CU MM
NITRITE URINE, QUANTITATIVE: NEGATIVE
OPIATES, URINE: NEGATIVE
OXYCODONE: NEGATIVE
PDW BLD-RTO: 17.1 % (ref 11.7–14.9)
PH, URINE: 5 (ref 5–8)
PHENCYCLIDINE, URINE: NEGATIVE
PLATELET # BLD: 278 K/CU MM (ref 140–440)
PLT MORPHOLOGY: ABNORMAL
PMV BLD AUTO: 11.2 FL (ref 7.5–11.1)
POTASSIUM SERPL-SCNC: 4.9 MMOL/L (ref 3.5–5.1)
PRO-BNP: 6172 PG/ML
PROTEIN UA: 100 MG/DL
PROTHROMBIN TIME: 14.3 SECONDS (ref 11.7–14.5)
RBC # BLD: 6.34 M/CU MM (ref 4.6–6.2)
RBC URINE: ABNORMAL /HPF (ref 0–3)
SARS-COV-2, NAAT: NOT DETECTED
SEGMENTED NEUTROPHILS ABSOLUTE COUNT: 16.4 K/CU MM
SEGMENTED NEUTROPHILS RELATIVE PERCENT: 73 % (ref 36–66)
SODIUM BLD-SCNC: 135 MMOL/L (ref 135–145)
SOURCE: NORMAL
SPECIFIC GRAVITY UA: 1.03 (ref 1–1.03)
TOTAL PROTEIN: 7.8 GM/DL (ref 6.4–8.2)
TROPONIN T: 0.07 NG/ML
UROBILINOGEN, URINE: 0.2 MG/DL (ref 0.2–1)
WBC # BLD: 22.6 K/CU MM (ref 4–10.5)
WBC UA: ABNORMAL /HPF (ref 0–2)

## 2021-09-13 PROCEDURE — 85610 PROTHROMBIN TIME: CPT

## 2021-09-13 PROCEDURE — 80053 COMPREHEN METABOLIC PANEL: CPT

## 2021-09-13 PROCEDURE — 83735 ASSAY OF MAGNESIUM: CPT

## 2021-09-13 PROCEDURE — 83690 ASSAY OF LIPASE: CPT

## 2021-09-13 PROCEDURE — 96375 TX/PRO/DX INJ NEW DRUG ADDON: CPT

## 2021-09-13 PROCEDURE — 6360000002 HC RX W HCPCS: Performed by: EMERGENCY MEDICINE

## 2021-09-13 PROCEDURE — 71275 CT ANGIOGRAPHY CHEST: CPT

## 2021-09-13 PROCEDURE — 85027 COMPLETE CBC AUTOMATED: CPT

## 2021-09-13 PROCEDURE — 87150 DNA/RNA AMPLIFIED PROBE: CPT

## 2021-09-13 PROCEDURE — 74177 CT ABD & PELVIS W/CONTRAST: CPT

## 2021-09-13 PROCEDURE — 96376 TX/PRO/DX INJ SAME DRUG ADON: CPT

## 2021-09-13 PROCEDURE — 96361 HYDRATE IV INFUSION ADD-ON: CPT

## 2021-09-13 PROCEDURE — 93005 ELECTROCARDIOGRAM TRACING: CPT | Performed by: EMERGENCY MEDICINE

## 2021-09-13 PROCEDURE — 2000000000 HC ICU R&B

## 2021-09-13 PROCEDURE — 6360000004 HC RX CONTRAST MEDICATION: Performed by: EMERGENCY MEDICINE

## 2021-09-13 PROCEDURE — 83605 ASSAY OF LACTIC ACID: CPT

## 2021-09-13 PROCEDURE — 87040 BLOOD CULTURE FOR BACTERIA: CPT

## 2021-09-13 PROCEDURE — G0480 DRUG TEST DEF 1-7 CLASSES: HCPCS

## 2021-09-13 PROCEDURE — 80307 DRUG TEST PRSMV CHEM ANLYZR: CPT

## 2021-09-13 PROCEDURE — 99284 EMERGENCY DEPT VISIT MOD MDM: CPT

## 2021-09-13 PROCEDURE — 87635 SARS-COV-2 COVID-19 AMP PRB: CPT

## 2021-09-13 PROCEDURE — 96365 THER/PROPH/DIAG IV INF INIT: CPT

## 2021-09-13 PROCEDURE — 81001 URINALYSIS AUTO W/SCOPE: CPT

## 2021-09-13 PROCEDURE — 83880 ASSAY OF NATRIURETIC PEPTIDE: CPT

## 2021-09-13 PROCEDURE — 96367 TX/PROPH/DG ADDL SEQ IV INF: CPT

## 2021-09-13 PROCEDURE — 85007 BL SMEAR W/DIFF WBC COUNT: CPT

## 2021-09-13 PROCEDURE — 71045 X-RAY EXAM CHEST 1 VIEW: CPT

## 2021-09-13 PROCEDURE — 85730 THROMBOPLASTIN TIME PARTIAL: CPT

## 2021-09-13 PROCEDURE — 2580000003 HC RX 258: Performed by: EMERGENCY MEDICINE

## 2021-09-13 PROCEDURE — 84484 ASSAY OF TROPONIN QUANT: CPT

## 2021-09-13 PROCEDURE — 94761 N-INVAS EAR/PLS OXIMETRY MLT: CPT

## 2021-09-13 PROCEDURE — 2700000000 HC OXYGEN THERAPY PER DAY

## 2021-09-13 RX ORDER — ONDANSETRON 2 MG/ML
4 INJECTION INTRAMUSCULAR; INTRAVENOUS EVERY 6 HOURS PRN
Status: DISCONTINUED | OUTPATIENT
Start: 2021-09-13 | End: 2021-09-17 | Stop reason: HOSPADM

## 2021-09-13 RX ORDER — MORPHINE SULFATE 4 MG/ML
4 INJECTION, SOLUTION INTRAMUSCULAR; INTRAVENOUS ONCE
Status: COMPLETED | OUTPATIENT
Start: 2021-09-13 | End: 2021-09-13

## 2021-09-13 RX ORDER — HEPARIN SODIUM 5000 [USP'U]/ML
80 INJECTION, SOLUTION INTRAVENOUS; SUBCUTANEOUS ONCE
Status: COMPLETED | OUTPATIENT
Start: 2021-09-13 | End: 2021-09-13

## 2021-09-13 RX ORDER — 0.9 % SODIUM CHLORIDE 0.9 %
1000 INTRAVENOUS SOLUTION INTRAVENOUS ONCE
Status: COMPLETED | OUTPATIENT
Start: 2021-09-13 | End: 2021-09-13

## 2021-09-13 RX ORDER — HEPARIN SODIUM 10000 [USP'U]/100ML
18 INJECTION, SOLUTION INTRAVENOUS CONTINUOUS
Status: DISCONTINUED | OUTPATIENT
Start: 2021-09-13 | End: 2021-09-14

## 2021-09-13 RX ADMIN — IOPAMIDOL 95 ML: 755 INJECTION, SOLUTION INTRAVENOUS at 18:47

## 2021-09-13 RX ADMIN — HEPARIN SODIUM 5800 UNITS: 5000 INJECTION INTRAVENOUS; SUBCUTANEOUS at 21:40

## 2021-09-13 RX ADMIN — CEFTRIAXONE SODIUM 1000 MG: 1 INJECTION, POWDER, FOR SOLUTION INTRAMUSCULAR; INTRAVENOUS at 19:34

## 2021-09-13 RX ADMIN — MORPHINE SULFATE 4 MG: 4 INJECTION INTRAVENOUS at 17:51

## 2021-09-13 RX ADMIN — MORPHINE SULFATE 4 MG: 4 INJECTION INTRAVENOUS at 20:11

## 2021-09-13 RX ADMIN — HEPARIN SODIUM AND DEXTROSE 18 UNITS/KG/HR: 10000; 5 INJECTION INTRAVENOUS at 21:44

## 2021-09-13 RX ADMIN — ONDANSETRON 4 MG: 2 INJECTION INTRAMUSCULAR; INTRAVENOUS at 20:10

## 2021-09-13 RX ADMIN — SODIUM CHLORIDE 1000 ML: 9 INJECTION, SOLUTION INTRAVENOUS at 17:51

## 2021-09-13 RX ADMIN — ONDANSETRON 4 MG: 2 INJECTION INTRAMUSCULAR; INTRAVENOUS at 17:51

## 2021-09-13 RX ADMIN — AZITHROMYCIN MONOHYDRATE 500 MG: 500 INJECTION, POWDER, LYOPHILIZED, FOR SOLUTION INTRAVENOUS at 19:45

## 2021-09-13 ASSESSMENT — PAIN SCALES - GENERAL
PAINLEVEL_OUTOF10: 7
PAINLEVEL_OUTOF10: 7

## 2021-09-13 NOTE — ED TRIAGE NOTES
Pt arrived via triage with c/o abdominal pain, body aches and shortness of breath. Pt reports no sick contacts. Pt reports having covid vaccines. Pt is alert, oriented and ambulatory. Pt reports hx of pancreatitis and reports drinking etoh about 5 days ago.

## 2021-09-13 NOTE — ED PROVIDER NOTES
7:09 PM   Patient transferred from Dr. Candi Boswell to me. Will follow up on rapid Covid, CT chest, abdomen and pelvis and disposition patient appropriately. Expected disposition is admission. 9:03 PM  Radiology calls with stat read. CTA chest: Acute saddle emboli extending into the upper and lower lobe pulmonary arteries bilaterally. There is right heart strain. Patient does have elevated troponin as well as elevated BNP and labs. His white count is 22.6. CT abdomen and pelvis: Deep vein thrombosis extending from the left iliac vein into the inferior vena cava. Discussed these findings with patient. He states that he is not taking his Xarelto in at least 2 days. I did discuss heparin dosing with pharmacy, Ignacio Camarillo, who recommends giving patient a bolus of heparin of 80 units/kg as he has not taken his Xarelto in at least 48 hours, and recommends starting heparin at 18 units/kg/h for patient's continuous drip. Patient is in agreement with this. 9:47 PM  Case discussed with Dr. Sadie Merchant, cardiology, who agrees with plan of care with heparin bolus and drip. He states he has reviewed images. He does not recommend any emergent intervention at this time. He states if patient becomes unstable, becomes hypotensive, he may be started on TPA. 10:08 PM  Case discussed with Dr. Sherif Rich, hospitalist, who agrees with admission. Patient to go by ground MICU to [facility] for further evaluation and management, and escalation of care. CRITICAL CARE NOTE:  There was a high probability of clinically significant life-threatening deterioration of the patient's condition requiring my urgent intervention due to pulmonary embolism. Interpretation of labs and imaging, expert consultation was performed to address this. Total critical care time is AT LEAST 30 minutes.     This includes vital sign monitoring, pulse oximetry monitoring, telemetry monitoring, clinical response to the IV medications, reviewing the nursing notes, consultation time, dictation/documentation time, and interpretation of the lab work. This time excludes time spent performing procedures and separately billable procedures and family discussion time.          [id number] [facility],   09/13/21 4255

## 2021-09-13 NOTE — ED NOTES
Patient's oxygen saturation 89% on RA at this time. Patient placed on 2L NC.      Sandra Escamilla RN  09/13/21 1942

## 2021-09-13 NOTE — ED PROVIDER NOTES
Triage Chief Complaint:   Abdominal Pain and URI    [place]:  Cele Nugent is a 53 y.o. male that presents with abdominal pain, shortness of breath and body aches. Patient was in baseline state of health until last Friday when the above started. Patient reports that he developed mild symptoms on Friday that worsened throughout the day Saturday. Patient has had progressive abdominal pain and shortness of breath since onset. Abdominal pain is mid abdomen, constant, currently severe and nonradiating. Patient ports that it feels somewhat similar to when he had pancreatitis in the past and he did do some drinking recently. Additionally, patient has had progressive shortness of breath especially with exertion. No coughing. No fevers or chills. Patient is vaccinated to COVID-19 as of this past spring. No known cardiac or pulmonary disease. Patient does have a history of DVT and is on Xarelto and he reports adherence to this. Patient is a smoker.     ROS:  General:  No fevers, no chills, no weakness  Eyes:  No recent vison changes, no discharge  ENT:  No sore throat, no nasal congestion, no hearing changes  Cardiovascular:  No chest pain, no palpitations  Respiratory:  + shortness of breath, no cough, no wheezing  Gastrointestinal:  + pain, no nausea, no vomiting, no diarrhea  Musculoskeletal:  + muscle pain/body aches, no joint pain  Skin:  No rash, no pruritis, no easy bruising  Neurologic:  No speech problems, no headache, no extremity numbness, no extremity tingling, no extremity weakness  Psychiatric:  No anxiety  Genitourinary:  No dysuria, no hematuria  Endocrine:  No unexpected weight gain, no unexpected weight loss  Extremities:  no edema, no pain    Past Medical History:   Diagnosis Date    Acute pancreatitis 10/19/2012    admitted to [facility], but left AMA    Alcohol abuse     Depression     H/O angiography 06/15/2021    conclusion:\" patent left femoral vein, moderate to severe stenosis of left common iliac vein    H/O cardiovascular stress test 6/18/2012, 12/27/2010 6/18/2012-Normal Myocardial Perfusion Study. Post stress myocardial perfusion images show a normal pattern of perfusion in all regions. Post stress LV is normal size and function. Normal perfusion in distribution of all coronaries. EF 65%.  H/O echocardiogram 06/21/2012 6/21/2012-LV normal size. Normal LV wall thickness. LV systolic function normal. EF=>55%. LV wall motion normal. The atrial septal defect is small.  History of complete ECG 6/18/2012, 1/6/2012, 12/27/2011    Hx of blood clots     Hypertension     Non compliance with medical treatment      Past Surgical History:   Procedure Laterality Date    CARDIAC CATHETERIZATION  06/15/2021    conclusion:\" patent left femoral vein, moderate to severe stenosis of left common iliac vein    SKIN BIOPSY       Family History   Problem Relation Age of Onset    Diabetes Father     Kidney Disease Father      Social History     Socioeconomic History    Marital status:      Spouse name: Not on file    Number of children: 1    Years of education: Not on file    Highest education level: Not on file   Occupational History    Occupation: Manger/cook   Tobacco Use    Smoking status: Current Every Day Smoker     Packs/day: 0.50     Types: Cigarettes    Smokeless tobacco: Never Used   Substance and Sexual Activity    Alcohol use: Yes     Comment: social drinking     Drug use: No    Sexual activity: Not on file   Other Topics Concern    Not on file   Social History Narrative    Not on file     Social Determinants of Health     Financial Resource Strain:     Difficulty of Paying Living Expenses:    Food Insecurity:     Worried About Running Out of Food in the Last Year:     Ran Out of Food in the Last Year:    Transportation Needs:     Lack of Transportation (Medical):      Lack of Transportation (Non-Medical):    Physical Activity:     Days of Exercise per Week:  Minutes of Exercise per Session:    Stress:     Feeling of Stress :    Social Connections:     Frequency of Communication with Friends and Family:     Frequency of Social Gatherings with Friends and Family:     Attends [employer] Services:     Active Member of Clubs or Organizations:     Attends Club or Organization Meetings:     Marital Status:    Intimate Partner Violence:     Fear of Current or Ex-Partner:     Emotionally Abused:     Physically Abused:     Sexually Abused:      Current Facility-Administered Medications   Medication Dose Route Frequency Provider Last Rate Last Admin    0.9 % sodium chloride bolus  1,000 mL IntraVENous Once Khushbu العلي  mL/hr at 09/13/21 1751 1,000 mL at 09/13/21 1751    ondansetron (ZOFRAN) injection 4 mg  4 mg IntraVENous Q6H PRN Khushbu العلي MD   4 mg at 09/13/21 1751    cefTRIAXone (ROCEPHIN) 1000 mg IVPB in 50 mL D5W minibag  1,000 mg IntraVENous Once Khushbu العلي MD        azithromycin (ZITHROMAX) 500 mg in D5W 250ml addavial  500 mg IntraVENous Once Khushbu العلي MD         Current Outpatient Medications   Medication Sig Dispense Refill    lisinopril (PRINIVIL;ZESTRIL) 10 MG tablet Take 1 tablet by mouth daily 30 tablet 5    rivaroxaban (XARELTO) 20 MG TABS tablet Take 1 tablet by mouth daily (with breakfast) Once 15mg bid for 21 days is done 90 tablet 1     No Known Allergies    Nursing Notes Reviewed    Physical Exam:  ED Triage Vitals [09/13/21 1626]   Enc Vitals Group      BP (!) 126/103      Pulse 118      Resp 20      Temp 98 °F (36.7 °C)      Temp src       SpO2 93 %      Weight 160 lb (72.6 kg)      Height 5' 10\" (1.778 m)      Head Circumference       Peak Flow       Pain Score       Pain Loc       Pain Edu? Excl. in [address]? My pulse ox interpretation is - normal    General appearance: Appears uncomfortable but overall nontoxic. Skin:  Warm. Dry. Subcutaneous skin nodules which patient reports are chronic.   Eye:  Extraocular movements intact. Ears, nose, mouth and throat:  Oral mucosa moist   Neck:  Trachea midline. Extremity:  No swelling. Normal ROM     Heart: Tachycardic but regular, normal S1 & S2, no extra heart sounds. Perfusion:  Intact. Strong symmetric bilateral radial and PT pulses. Respiratory:  Lungs clear to auscultation bilaterally. Respirations nonlabored. Lungs are overtly clear. Mild tachypnea noted. Speaking clearly in full sentences. Abdominal:  Normal bowel sounds. Soft. Moderate mid abdominal tenderness to palpation with some voluntary guarding. No generalized peritoneal signs. Non distended. Back:  No CVA tenderness to palpation     Neurological:  Alert and oriented times 3. No focal neuro deficits.              Psychiatric:  Appropriate    I have reviewed and interpreted all of the currently available lab results from this visit (if applicable):  Results for orders placed or performed during the hospital encounter of 09/13/21   CBC auto diff   Result Value Ref Range    WBC 22.6 (H) 4.0 - 10.5 K/CU MM    RBC 6.34 (H) 4.6 - 6.2 M/CU MM    Hemoglobin 18.4 (H) 13.5 - 18.0 GM/DL    Hematocrit 56.4 (H) 42 - 52 %    MCV 89.0 78 - 100 FL    MCH 29.0 27 - 31 PG    MCHC 32.6 32.0 - 36.0 %    RDW 17.1 (H) 11.7 - 14.9 %    Platelets 926 650 - 222 K/CU MM    MPV 11.2 (H) 7.5 - 11.1 FL    Myelocyte Percent 2 (H) 0.0 %    Metamyelocytes Relative 1 (H) 0.0 %    Bands Relative 4 (L) 5 - 11 %    Segs Relative 73.0 (H) 36 - 66 %    Eosinophils % 2.0 0 - 3 %    Basophils % 1.0 0 - 1 %    Lymphocytes % 7.0 (L) 24 - 44 %    Monocytes % 10.0 (H) 0 - 4 %    Myelocytes Absolute 0.45 K/CU MM    Metamyelocytes Absolute 0.23 K/CU MM    Bands Absolute 0.90 K/CU MM    Segs Absolute 16.4 K/CU MM    Eosinophils Absolute 0.5 K/CU MM    Basophils Absolute 0.2 K/CU MM    Lymphocytes Absolute 1.6 K/CU MM    Monocytes Absolute 2.3 K/CU MM    Differential Type MANUAL DIFFERENTIAL     Anisocytosis 1+     PLT Morphology GIANT CMP   Result Value Ref Range    Sodium 135 135 - 145 MMOL/L    Potassium 4.9 3.5 - 5.1 MMOL/L    Chloride 96 (L) 99 - 110 mMol/L    CO2 24 21 - 32 MMOL/L    BUN 13 6 - 23 MG/DL    CREATININE 1.1 0.9 - 1.3 MG/DL    Glucose 116 (H) 70 - 99 MG/DL    Calcium 10.1 8.3 - 10.6 MG/DL    Albumin 4.0 3.4 - 5.0 GM/DL    Total Protein 7.8 6.4 - 8.2 GM/DL    Total Bilirubin 1.1 (H) 0.0 - 1.0 MG/DL    ALT 7 (L) 10 - 40 U/L    AST 15 15 - 37 IU/L    Alkaline Phosphatase 134 (H) 40 - 129 IU/L    GFR Non-African American >60 >60 mL/min/1.73m2    GFR African American >60 >60 mL/min/1.73m2    Anion Gap 15 4 - 16   Lipase   Result Value Ref Range    Lipase 8 (L) 13 - 60 IU/L   Urinalysis   Result Value Ref Range    Color, UA DARK YELLOW (A) YELLOW    Clarity, UA CLOUDY (A) CLEAR    Glucose, Urine NEGATIVE NEGATIVE MG/DL    Bilirubin Urine LARGE (A) NEGATIVE MG/DL    Ketones, Urine 80 (A) NEGATIVE MG/DL    Specific Gravity, UA 1.030 1.001 - 1.035    Blood, Urine NEGATIVE NEGATIVE    pH, Urine 5.0 5.0 - 8.0    Protein,  (A) NEGATIVE MG/DL    Urobilinogen, Urine 0.2 0.2 - 1.0 MG/DL    Nitrite Urine, Quantitative NEGATIVE NEGATIVE    Leukocyte Esterase, Urine NEGATIVE NEGATIVE    RBC, UA NO CELLS SEEN 0 - 3 /HPF    WBC, UA NO CELLS SEEN 0 - 2 /HPF    Epithelial Cells, UA 5 SQUAMOUS EPITHELIAL CELLS, 5 RENAL TUBULAR CELLS /HPF    Cast Type 1+ HYALINE CASTS OBSERVED NO CAST FORMS SEEN /HPF    Bacteria, UA TRACE (A) NEGATIVE /HPF    Crystal Type 1+ STARCH CRYSTALS OBSERVED NEGATIVE /HPF    Urinalysis Comments 4+ MUCAS OBSERVED    Brain Natriuretic Peptide   Result Value Ref Range    Pro-BNP 6,172 (H) <300 PG/ML   Troponin   Result Value Ref Range    Troponin T 0.072 (H) <0.01 NG/ML   ETOH Blood   Result Value Ref Range    Alcohol Scrn <0.01 <0.01 %WT/VOL   Drug screen multi urine   Result Value Ref Range    Cannabinoid Scrn, Ur UNCONFIRMED POSITIVE (A) NEGATIVE    Amphetamines UNCONFIRMED POSITIVE (A) NEGATIVE    Cocaine Metabolite UNCONFIRMED POSITIVE (A) NEGATIVE    Benzodiazepine Screen, Urine NEGATIVE NEGATIVE    Barbiturate Screen, Ur NEGATIVE NEGATIVE    Opiates, Urine NEGATIVE NEGATIVE    Phencyclidine, Urine NEGATIVE NEGATIVE    Oxycodone NEGATIVE NEGATIVE   Magnesium   Result Value Ref Range    Magnesium 2.1 1.8 - 2.4 mg/dl   ICTOTEST, URINE   Result Value Ref Range    Ictotest POSITIVE    EKG 12 Lead   Result Value Ref Range    Ventricular Rate 116 BPM    Atrial Rate 116 BPM    P-R Interval 134 ms    QRS Duration 90 ms    Q-T Interval 356 ms    QTc Calculation (Bazett) 494 ms    P Axis 63 degrees    R Axis 56 degrees    T Axis 21 degrees    Diagnosis       Sinus tachycardia with occasional premature ventricular complexes and fusion complexes  Otherwise normal ECG  When compared with ECG of 29-APR-2021 14:13,  fusion complexes are now present  premature ventricular complexes are now present        Radiographs (if obtained):  [] The following radiograph was interpreted by myself in the absence of a radiologist:   [x] Radiologist's Report Reviewed:  XR CHEST PORTABLE   Final Result   Emphysematous changes again noted      Bilateral pulmonary opacities similar to the previous exam appear chronic      No acute cardiopulmonary process identified         CT ABDOMEN PELVIS W IV CONTRAST Additional Contrast? None    (Results Pending)   CTA PULMONARY W CONTRAST    (Results Pending)         EKG (if obtained): (All EKG's are interpreted by myself in the absence of a cardiologist)  12 lead EKG per my interpretation:  Sinus Tachycardia with PVC at 116 with PVC  Axis is   Normal  QTc is  slightly prolonged at 494. There is no specific T wave changes appreciated. There is no specific ST wave changes appreciated. No STEMI    Prior EKG to compare with was available and ST depressions in inferior leads on EKG previously this summer are no longer present today's EKG. No other clinically significant change in overall morphology.       Chart review shows recent radiographs:  No results found. MDM:  Pt presents as above. Emergent conditions considered. Presentation prompted initial labs, EKG and imaging. IVs established IV morphine, IV Zofran and IV fluid bolus were given. Rapid Covid is sent. Chest x-ray demonstrating bilateral pulmonary opacities similar to previous exam and appear chronic. No other acute process noted. CBC is with marked leukocytosis of 22. CMP is with mild hyperglycemia. Lipase is not suggestive of a pancreatitis. BNP is elevated suggestive some degree of volume overload. Troponin is abnormal and will need to be trended. Alcohol level is not suggestive of intoxication. Magnesium within normal limits. Urinalysis is dark and is with elevated bilirubin but not suggestive of a urinary tract infection. Urine drug screen is positive for THC, amphetamines and cocaine. Given patient's pulmonary opacities I will cover for community-acquired pneumonia with Rocephin and azithromycin. Blood cultures and lactate to be obtained. Patient is receiving 1 L of IV fluids but further full 30 cc/kg bolus is held given patient's elevated BNP. CT imaging of patient's chest abdomen pelvis is pending to rule out PE or other acute intra-abdominal process given his tenderness. Patient will require admission for trending of his troponin in the setting of cocaine abuse and shortness of breath for possible substance-induced cardiomyopathy at a minimum. Definitive disposition is pending on signout to overnight physician, Dr. Erica Steele. Questions sought and answered with the patient. They voice understanding and agree with plan. CRITICAL CARE NOTE:  There was a high probability of clinically significant life-threatening deterioration of the patient's condition requiring my urgent intervention due to tachycardia, abnormal troponin.   IV fluid resuscitation, IV broad-spectrum antibiotic administration, IV narcotic ministration, telemetry monitoring was performed to address this. Total critical care time is 35 minutes. This includes vital sign monitoring, pulse oximetry monitoring, telemetry monitoring, clinical response to the IV medications, reviewing the nursing notes, consultation time, dictation/documentation time, and interpretation of the lab work. This time excludes time spent performing procedures and separately billable procedures and family discussion time. Clinical Impression:  1. Elevated troponin    2. Shortness of breath    3. Elevated brain natriuretic peptide (BNP) level    4. Opacities of both lungs present on chest x-ray    5. Nondependent cocaine abuse ([facility] [address]    6. Mild tetrahydrocannabinol (THC) abuse    7. Alcohol abuse    8. [facility]      Disposition referral (if applicable):  No follow-up provider specified. Disposition medications (if applicable):  New Prescriptions    No medications on file       Comment: Please note this report has been produced using speech recognition software and may contain errors related to that system including errors in grammar, punctuation, and spelling, as well as words and phrases that may be inappropriate. If there are any questions or concerns please feel free to contact the dictating provider for clarification.        Delvin Everett MD  09/13/21 [id number]

## 2021-09-13 NOTE — ED NOTES
Patient up to the restroom at this time to provide urine sample.       Silverio Saldaña RN  09/13/21 [id number]

## 2021-09-14 ENCOUNTER — APPOINTMENT (OUTPATIENT)
Dept: ULTRASOUND IMAGING | Age: 51
DRG: 121 | End: 2021-09-14
Payer: MEDICAID

## 2021-09-14 LAB
ALBUMIN SERPL-MCNC: 3.4 GM/DL (ref 3.4–5)
ALP BLD-CCNC: 102 IU/L (ref 40–128)
ALT SERPL-CCNC: 7 U/L (ref 10–40)
ANION GAP SERPL CALCULATED.3IONS-SCNC: 15 MMOL/L (ref 4–16)
APTT: 123.7 SECONDS (ref 25.1–37.1)
APTT: 53.1 SECONDS (ref 25.1–37.1)
APTT: 78 SECONDS (ref 25.1–37.1)
AST SERPL-CCNC: 13 IU/L (ref 15–37)
BASOPHILS ABSOLUTE: 0.1 K/CU MM
BASOPHILS RELATIVE PERCENT: 0.5 % (ref 0–1)
BILIRUB SERPL-MCNC: 0.6 MG/DL (ref 0–1)
BUN BLDV-MCNC: 14 MG/DL (ref 6–23)
CALCIUM SERPL-MCNC: 8.6 MG/DL (ref 8.3–10.6)
CHLORIDE BLD-SCNC: 99 MMOL/L (ref 99–110)
CO2: 19 MMOL/L (ref 21–32)
CREAT SERPL-MCNC: 0.9 MG/DL (ref 0.9–1.3)
DIFFERENTIAL TYPE: ABNORMAL
EKG ATRIAL RATE: 116 BPM
EKG DIAGNOSIS: NORMAL
EKG P AXIS: 63 DEGREES
EKG P-R INTERVAL: 134 MS
EKG Q-T INTERVAL: 356 MS
EKG QRS DURATION: 90 MS
EKG QTC CALCULATION (BAZETT): 494 MS
EKG R AXIS: 56 DEGREES
EKG T AXIS: 21 DEGREES
EKG VENTRICULAR RATE: 116 BPM
EOSINOPHILS ABSOLUTE: 0.2 K/CU MM
EOSINOPHILS RELATIVE PERCENT: 1.2 % (ref 0–3)
GFR AFRICAN AMERICAN: >60 ML/MIN/1.73M2
GFR NON-AFRICAN AMERICAN: >60 ML/MIN/1.73M2
GLUCOSE BLD-MCNC: 99 MG/DL (ref 70–99)
HCT VFR BLD CALC: 47.8 % (ref 42–52)
HCT VFR BLD CALC: 50.5 % (ref 42–52)
HEMOGLOBIN: 14.8 GM/DL (ref 13.5–18)
HEMOGLOBIN: 15.9 GM/DL (ref 13.5–18)
IMMATURE NEUTROPHIL %: 1 % (ref 0–0.43)
LV EF: 58 %
LVEF MODALITY: NORMAL
LYMPHOCYTES ABSOLUTE: 1.8 K/CU MM
LYMPHOCYTES RELATIVE PERCENT: 10.3 % (ref 24–44)
MCH RBC QN AUTO: 28.5 PG (ref 27–31)
MCH RBC QN AUTO: 28.7 PG (ref 27–31)
MCHC RBC AUTO-ENTMCNC: 31 % (ref 32–36)
MCHC RBC AUTO-ENTMCNC: 31.5 % (ref 32–36)
MCV RBC AUTO: 90.5 FL (ref 78–100)
MCV RBC AUTO: 92.8 FL (ref 78–100)
MONOCYTES ABSOLUTE: 1.8 K/CU MM
MONOCYTES RELATIVE PERCENT: 10.1 % (ref 0–4)
NUCLEATED RBC %: 0 %
PDW BLD-RTO: 15.3 % (ref 11.7–14.9)
PDW BLD-RTO: 15.7 % (ref 11.7–14.9)
PLATELET # BLD: 207 K/CU MM (ref 140–440)
PLATELET # BLD: 213 K/CU MM (ref 140–440)
PMV BLD AUTO: 10.6 FL (ref 7.5–11.1)
PMV BLD AUTO: 10.7 FL (ref 7.5–11.1)
POTASSIUM SERPL-SCNC: 4.7 MMOL/L (ref 3.5–5.1)
RBC # BLD: 5.15 M/CU MM (ref 4.6–6.2)
RBC # BLD: 5.58 M/CU MM (ref 4.6–6.2)
SEGMENTED NEUTROPHILS ABSOLUTE COUNT: 13.4 K/CU MM
SEGMENTED NEUTROPHILS RELATIVE PERCENT: 76.9 % (ref 36–66)
SODIUM BLD-SCNC: 133 MMOL/L (ref 135–145)
TOTAL IMMATURE NEUTOROPHIL: 0.17 K/CU MM
TOTAL NUCLEATED RBC: 0 K/CU MM
TOTAL PROTEIN: 5.9 GM/DL (ref 6.4–8.2)
TROPONIN T: 0.04 NG/ML
TROPONIN T: 0.04 NG/ML
WBC # BLD: 16.8 K/CU MM (ref 4–10.5)
WBC # BLD: 17.4 K/CU MM (ref 4–10.5)

## 2021-09-14 PROCEDURE — 85025 COMPLETE CBC W/AUTO DIFF WBC: CPT

## 2021-09-14 PROCEDURE — 6360000002 HC RX W HCPCS: Performed by: INTERNAL MEDICINE

## 2021-09-14 PROCEDURE — C1751 CATH, INF, PER/CENT/MIDLINE: HCPCS

## 2021-09-14 PROCEDURE — 36014 PLACE CATHETER IN ARTERY: CPT

## 2021-09-14 PROCEDURE — 2700000000 HC OXYGEN THERAPY PER DAY

## 2021-09-14 PROCEDURE — 85027 COMPLETE CBC AUTOMATED: CPT

## 2021-09-14 PROCEDURE — 94761 N-INVAS EAR/PLS OXIMETRY MLT: CPT

## 2021-09-14 PROCEDURE — 93970 EXTREMITY STUDY: CPT

## 2021-09-14 PROCEDURE — 3E05317 INTRODUCTION OF OTHER THROMBOLYTIC INTO PERIPHERAL ARTERY, PERCUTANEOUS APPROACH: ICD-10-PCS | Performed by: INTERNAL MEDICINE

## 2021-09-14 PROCEDURE — 2000000000 HC ICU R&B

## 2021-09-14 PROCEDURE — 6360000004 HC RX CONTRAST MEDICATION

## 2021-09-14 PROCEDURE — 93010 ELECTROCARDIOGRAM REPORT: CPT | Performed by: INTERNAL MEDICINE

## 2021-09-14 PROCEDURE — 93306 TTE W/DOPPLER COMPLETE: CPT

## 2021-09-14 PROCEDURE — 6370000000 HC RX 637 (ALT 250 FOR IP): Performed by: INTERNAL MEDICINE

## 2021-09-14 PROCEDURE — 37211 THROMBOLYTIC ART THERAPY: CPT

## 2021-09-14 PROCEDURE — 36014 PLACE CATHETER IN ARTERY: CPT | Performed by: INTERNAL MEDICINE

## 2021-09-14 PROCEDURE — 80053 COMPREHEN METABOLIC PANEL: CPT

## 2021-09-14 PROCEDURE — 4A023N6 MEASUREMENT OF CARDIAC SAMPLING AND PRESSURE, RIGHT HEART, PERCUTANEOUS APPROACH: ICD-10-PCS | Performed by: INTERNAL MEDICINE

## 2021-09-14 PROCEDURE — 6360000002 HC RX W HCPCS

## 2021-09-14 PROCEDURE — C1894 INTRO/SHEATH, NON-LASER: HCPCS

## 2021-09-14 PROCEDURE — B31S1ZZ FLUOROSCOPY OF RIGHT PULMONARY ARTERY USING LOW OSMOLAR CONTRAST: ICD-10-PCS | Performed by: INTERNAL MEDICINE

## 2021-09-14 PROCEDURE — 37211 THROMBOLYTIC ART THERAPY: CPT | Performed by: INTERNAL MEDICINE

## 2021-09-14 PROCEDURE — B31T1ZZ FLUOROSCOPY OF LEFT PULMONARY ARTERY USING LOW OSMOLAR CONTRAST: ICD-10-PCS | Performed by: INTERNAL MEDICINE

## 2021-09-14 PROCEDURE — C9113 INJ PANTOPRAZOLE SODIUM, VIA: HCPCS | Performed by: INTERNAL MEDICINE

## 2021-09-14 PROCEDURE — C1769 GUIDE WIRE: HCPCS

## 2021-09-14 PROCEDURE — 6360000002 HC RX W HCPCS: Performed by: EMERGENCY MEDICINE

## 2021-09-14 PROCEDURE — 99254 IP/OBS CNSLTJ NEW/EST MOD 60: CPT | Performed by: INTERNAL MEDICINE

## 2021-09-14 PROCEDURE — 2500000003 HC RX 250 WO HCPCS

## 2021-09-14 PROCEDURE — 2709999900 HC NON-CHARGEABLE SUPPLY

## 2021-09-14 PROCEDURE — 86141 C-REACTIVE PROTEIN HS: CPT

## 2021-09-14 PROCEDURE — 99291 CRITICAL CARE FIRST HOUR: CPT | Performed by: SURGERY

## 2021-09-14 PROCEDURE — 85730 THROMBOPLASTIN TIME PARTIAL: CPT

## 2021-09-14 PROCEDURE — 2580000003 HC RX 258: Performed by: INTERNAL MEDICINE

## 2021-09-14 PROCEDURE — 75743 ARTERY X-RAYS LUNGS: CPT

## 2021-09-14 PROCEDURE — 02FR3Z0 FRAGMENTATION OF LEFT PULMONARY ARTERY, PERCUTANEOUS APPROACH, ULTRASONIC: ICD-10-PCS | Performed by: INTERNAL MEDICINE

## 2021-09-14 PROCEDURE — 84484 ASSAY OF TROPONIN QUANT: CPT

## 2021-09-14 RX ORDER — POLYETHYLENE GLYCOL 3350 17 G/17G
17 POWDER, FOR SOLUTION ORAL DAILY PRN
Status: DISCONTINUED | OUTPATIENT
Start: 2021-09-14 | End: 2021-09-17 | Stop reason: HOSPADM

## 2021-09-14 RX ORDER — MORPHINE SULFATE 4 MG/ML
4 INJECTION, SOLUTION INTRAMUSCULAR; INTRAVENOUS EVERY 4 HOURS PRN
Status: DISCONTINUED | OUTPATIENT
Start: 2021-09-14 | End: 2021-09-14

## 2021-09-14 RX ORDER — SODIUM CHLORIDE 0.9 % (FLUSH) 0.9 %
5-40 SYRINGE (ML) INJECTION PRN
Status: DISCONTINUED | OUTPATIENT
Start: 2021-09-14 | End: 2021-09-17 | Stop reason: HOSPADM

## 2021-09-14 RX ORDER — HEPARIN SODIUM 10000 [USP'U]/100ML
500 INJECTION, SOLUTION INTRAVENOUS CONTINUOUS
Status: DISCONTINUED | OUTPATIENT
Start: 2021-09-14 | End: 2021-09-15

## 2021-09-14 RX ORDER — ACETAMINOPHEN 650 MG/1
650 SUPPOSITORY RECTAL EVERY 6 HOURS PRN
Status: DISCONTINUED | OUTPATIENT
Start: 2021-09-14 | End: 2021-09-17 | Stop reason: HOSPADM

## 2021-09-14 RX ORDER — SODIUM CHLORIDE 0.9 % (FLUSH) 0.9 %
5-40 SYRINGE (ML) INJECTION EVERY 12 HOURS SCHEDULED
Status: DISCONTINUED | OUTPATIENT
Start: 2021-09-14 | End: 2021-09-17 | Stop reason: HOSPADM

## 2021-09-14 RX ORDER — PANTOPRAZOLE SODIUM 40 MG/10ML
40 INJECTION, POWDER, LYOPHILIZED, FOR SOLUTION INTRAVENOUS DAILY
Status: DISCONTINUED | OUTPATIENT
Start: 2021-09-14 | End: 2021-09-17 | Stop reason: HOSPADM

## 2021-09-14 RX ORDER — LACTOBACILLUS RHAMNOSUS GG 10B CELL
1 CAPSULE ORAL DAILY
Status: DISCONTINUED | OUTPATIENT
Start: 2021-09-14 | End: 2021-09-17 | Stop reason: HOSPADM

## 2021-09-14 RX ORDER — HEPARIN SODIUM 1000 [USP'U]/ML
40 INJECTION, SOLUTION INTRAVENOUS; SUBCUTANEOUS PRN
Status: DISCONTINUED | OUTPATIENT
Start: 2021-09-14 | End: 2021-09-14 | Stop reason: ALTCHOICE

## 2021-09-14 RX ORDER — HEPARIN SODIUM 1000 [USP'U]/ML
80 INJECTION, SOLUTION INTRAVENOUS; SUBCUTANEOUS PRN
Status: DISCONTINUED | OUTPATIENT
Start: 2021-09-14 | End: 2021-09-14 | Stop reason: ALTCHOICE

## 2021-09-14 RX ORDER — SODIUM CHLORIDE 9 MG/ML
INJECTION, SOLUTION INTRAVENOUS CONTINUOUS
Status: DISCONTINUED | OUTPATIENT
Start: 2021-09-14 | End: 2021-09-15

## 2021-09-14 RX ORDER — NICOTINE 21 MG/24HR
1 PATCH, TRANSDERMAL 24 HOURS TRANSDERMAL DAILY
Status: DISCONTINUED | OUTPATIENT
Start: 2021-09-14 | End: 2021-09-17 | Stop reason: HOSPADM

## 2021-09-14 RX ORDER — HEPARIN SODIUM 10000 [USP'U]/100ML
5-30 INJECTION, SOLUTION INTRAVENOUS CONTINUOUS
Status: DISCONTINUED | OUTPATIENT
Start: 2021-09-14 | End: 2021-09-14

## 2021-09-14 RX ORDER — SODIUM CHLORIDE 9 MG/ML
25 INJECTION, SOLUTION INTRAVENOUS PRN
Status: DISCONTINUED | OUTPATIENT
Start: 2021-09-14 | End: 2021-09-17 | Stop reason: HOSPADM

## 2021-09-14 RX ORDER — ACETAMINOPHEN 325 MG/1
650 TABLET ORAL EVERY 6 HOURS PRN
Status: DISCONTINUED | OUTPATIENT
Start: 2021-09-14 | End: 2021-09-17 | Stop reason: HOSPADM

## 2021-09-14 RX ORDER — LACTOBACILLUS RHAMNOSUS GG 10B CELL
1 CAPSULE ORAL DAILY
Status: DISCONTINUED | OUTPATIENT
Start: 2021-09-14 | End: 2021-09-14 | Stop reason: CLARIF

## 2021-09-14 RX ADMIN — SODIUM CHLORIDE: 9 INJECTION, SOLUTION INTRAVENOUS at 18:59

## 2021-09-14 RX ADMIN — ONDANSETRON 4 MG: 2 INJECTION INTRAMUSCULAR; INTRAVENOUS at 01:10

## 2021-09-14 RX ADMIN — HYDROMORPHONE HYDROCHLORIDE 1 MG: 1 INJECTION, SOLUTION INTRAMUSCULAR; INTRAVENOUS; SUBCUTANEOUS at 15:56

## 2021-09-14 RX ADMIN — PIPERACILLIN AND TAZOBACTAM 3375 MG: 3; .375 INJECTION, POWDER, FOR SOLUTION INTRAVENOUS at 11:58

## 2021-09-14 RX ADMIN — PIPERACILLIN AND TAZOBACTAM 3375 MG: 3; .375 INJECTION, POWDER, FOR SOLUTION INTRAVENOUS at 06:41

## 2021-09-14 RX ADMIN — SODIUM CHLORIDE 25 ML: 9 INJECTION, SOLUTION INTRAVENOUS at 21:02

## 2021-09-14 RX ADMIN — ALTEPLASE: 2.2 INJECTION, POWDER, LYOPHILIZED, FOR SOLUTION INTRAVENOUS at 18:38

## 2021-09-14 RX ADMIN — ACETAMINOPHEN 650 MG: 325 TABLET ORAL at 06:27

## 2021-09-14 RX ADMIN — HYDROMORPHONE HYDROCHLORIDE 1 MG: 1 INJECTION, SOLUTION INTRAMUSCULAR; INTRAVENOUS; SUBCUTANEOUS at 13:42

## 2021-09-14 RX ADMIN — MORPHINE SULFATE 4 MG: 4 INJECTION INTRAVENOUS at 01:10

## 2021-09-14 RX ADMIN — SODIUM CHLORIDE 25 ML: 9 INJECTION, SOLUTION INTRAVENOUS at 06:39

## 2021-09-14 RX ADMIN — SODIUM CHLORIDE: 9 INJECTION, SOLUTION INTRAVENOUS at 19:00

## 2021-09-14 RX ADMIN — HEPARIN SODIUM AND DEXTROSE 500 UNITS/HR: 10000; 5 INJECTION INTRAVENOUS at 18:29

## 2021-09-14 RX ADMIN — SODIUM CHLORIDE, PRESERVATIVE FREE 10 ML: 5 INJECTION INTRAVENOUS at 08:43

## 2021-09-14 RX ADMIN — MORPHINE SULFATE 4 MG: 4 INJECTION INTRAVENOUS at 11:11

## 2021-09-14 RX ADMIN — PANTOPRAZOLE SODIUM 40 MG: 40 INJECTION, POWDER, FOR SOLUTION INTRAVENOUS at 10:51

## 2021-09-14 RX ADMIN — Medication 1 CAPSULE: at 10:51

## 2021-09-14 RX ADMIN — PIPERACILLIN AND TAZOBACTAM 3375 MG: 3; .375 INJECTION, POWDER, FOR SOLUTION INTRAVENOUS at 21:03

## 2021-09-14 RX ADMIN — SODIUM CHLORIDE, PRESERVATIVE FREE 10 ML: 5 INJECTION INTRAVENOUS at 21:04

## 2021-09-14 RX ADMIN — MORPHINE SULFATE 4 MG: 4 INJECTION INTRAVENOUS at 07:03

## 2021-09-14 RX ADMIN — HYDROMORPHONE HYDROCHLORIDE 1 MG: 1 INJECTION, SOLUTION INTRAMUSCULAR; INTRAVENOUS; SUBCUTANEOUS at 22:50

## 2021-09-14 RX ADMIN — HEPARIN SODIUM AND DEXTROSE 22 UNITS/KG/HR: 10000; 5 INJECTION INTRAVENOUS at 14:23

## 2021-09-14 RX ADMIN — HYDROMORPHONE HYDROCHLORIDE 1 MG: 1 INJECTION, SOLUTION INTRAMUSCULAR; INTRAVENOUS; SUBCUTANEOUS at 20:50

## 2021-09-14 ASSESSMENT — PAIN DESCRIPTION - PROGRESSION
CLINICAL_PROGRESSION: GRADUALLY WORSENING
CLINICAL_PROGRESSION: NOT CHANGED
CLINICAL_PROGRESSION: GRADUALLY WORSENING
CLINICAL_PROGRESSION: NOT CHANGED

## 2021-09-14 ASSESSMENT — PAIN DESCRIPTION - ONSET
ONSET: ON-GOING

## 2021-09-14 ASSESSMENT — PAIN DESCRIPTION - FREQUENCY
FREQUENCY: CONTINUOUS

## 2021-09-14 ASSESSMENT — ENCOUNTER SYMPTOMS
EYE REDNESS: 0
EYE ITCHING: 0
CHOKING: 0
SORE THROAT: 0
ABDOMINAL PAIN: 1
STRIDOR: 0
CONSTIPATION: 0
ANAL BLEEDING: 0
PHOTOPHOBIA: 0
SHORTNESS OF BREATH: 1
RECTAL PAIN: 0
BACK PAIN: 0
COLOR CHANGE: 0
APNEA: 0

## 2021-09-14 ASSESSMENT — PAIN DESCRIPTION - PAIN TYPE
TYPE: ACUTE PAIN

## 2021-09-14 ASSESSMENT — PAIN DESCRIPTION - DESCRIPTORS
DESCRIPTORS: ACHING;DISCOMFORT
DESCRIPTORS: ACHING;CONSTANT
DESCRIPTORS: ACHING;DISCOMFORT
DESCRIPTORS: ACHING;STABBING
DESCRIPTORS: ACHING;DISCOMFORT
DESCRIPTORS: ACHING;DISCOMFORT

## 2021-09-14 ASSESSMENT — PAIN DESCRIPTION - LOCATION
LOCATION: ABDOMEN
LOCATION: ABDOMEN;GENERALIZED
LOCATION: ABDOMEN;GENERALIZED
LOCATION: ABDOMEN
LOCATION: ABDOMEN
LOCATION: ABDOMEN;GENERALIZED
LOCATION: ABDOMEN;GENERALIZED
LOCATION: ABDOMEN
LOCATION: GENERALIZED
LOCATION: ABDOMEN

## 2021-09-14 ASSESSMENT — PAIN SCALES - GENERAL
PAINLEVEL_OUTOF10: 7
PAINLEVEL_OUTOF10: 5
PAINLEVEL_OUTOF10: 4
PAINLEVEL_OUTOF10: 7
PAINLEVEL_OUTOF10: 6
PAINLEVEL_OUTOF10: 7
PAINLEVEL_OUTOF10: 7
PAINLEVEL_OUTOF10: 5
PAINLEVEL_OUTOF10: 6
PAINLEVEL_OUTOF10: 5
PAINLEVEL_OUTOF10: 7

## 2021-09-14 ASSESSMENT — PAIN DESCRIPTION - ORIENTATION
ORIENTATION: MID

## 2021-09-14 NOTE — CARE COORDINATION
Cm met with pt to initiate discharge planning. Pt admitted with Saddle PE. Pt with history of other previous blood clots. Pt lives with his mother in a 1 story home. Pt's mother is reportedly in good health. Pt's mother drives. Pt states that he has a PCP through the [facility]. Pt has insurance to assist with cost of Rxs. Pt admits to not being good about taking medication but is hoping that the severity of this situation will make him be more cognizant of and committed to the need to take meds as directed. Cm discussed positive drug screen with pt. He states that he really considers alcohol to be his primary issue and not drugs. He states that he was at a party recently and wonders if his marijuana was laced with something. He does admit however picking up a female friend to take her to buy drugs and perhaps paying for them at times and then taking her back home. Pt states that he has been through treatment many times and does not feel that he needs info or referrals for additional treatment but is willing to accept an AA meeting schedule. Cm name and ph# placed on whiteboard. Pt encouraged to call if CM can be of further assistance.

## 2021-09-14 NOTE — CONSULTS
CARDIOLOGY CONSULT NOTE   Reason for consultation:  PE    Referring physician:  Sharyle Dalton, MD     Primary care physician: Kimi Combs      Dear  Dr. Sharyle Dalton, MD   Thanks for the consult. Chief Complaints :  Chief Complaint   Patient presents with    Abdominal Pain    URI        History of present illness:Red is a 53 y. o.year old who presents with concerns for abdominal pain which gets worse when he takes in a deep breath associated with shortness of breath and cough. He is describes mostly the pain gets worse when he takes in a deep breath it is more in the left lower quadrant of his chest and upper abdomen. He is supposed to be on anticoagulation Xarelto but he is not taking it he has history of multiple PEs and DVTs in the past as a matter fact he had left iliac femoral vein angioplasty in April 2021 repeated again in June 2021 due to concerns for left common iliac vein stenosis  He also has history of peripheral arterial disease, unfortunately he is actively using cocaine and amphetamines he also significant alcohol and tobacco abuse  CT chest was concerning for saddle pulmonary embolism. He first had PE couple of years ago but echo in April 2021 showed slightly dilated right ventricle without any right heart strain. Reports he has some chest pain now when he takes in a deep breath      Past medical history:    has a past medical history of Acute pancreatitis, Alcohol abuse, Depression, H/O angiography, H/O cardiovascular stress test, H/O echocardiogram, History of complete ECG, Hx of blood clots, Hypertension, and Non compliance with medical treatment. Past surgical history:   has a past surgical history that includes skin biopsy and Cardiac catheterization (06/15/2021). Social History:   reports that he has been smoking cigarettes. He has been smoking about 0.50 packs per day. He has never used smokeless tobacco. He reports current alcohol use.  He reports that he does not use drugs.   Family history:   no family history of CAD, STROKE of DM at early age    No Known Allergies    sodium chloride flush 0.9 % injection 5-40 mL, 2 times per day  sodium chloride flush 0.9 % injection 5-40 mL, PRN  0.9 % sodium chloride infusion, PRN  polyethylene glycol (GLYCOLAX) packet 17 g, Daily PRN  acetaminophen (TYLENOL) tablet 650 mg, Q6H PRN   Or  acetaminophen (TYLENOL) suppository 650 mg, Q6H PRN  heparin (porcine) injection 5,470 Units, PRN  heparin (porcine) injection 2,740 Units, PRN  heparin 25,000 units in dextrose 5% 250 mL (premix) infusion, Continuous  morphine sulfate (PF) injection 4 mg, Q4H PRN  piperacillin-tazobactam (ZOSYN) 3,375 mg in dextrose 5 % 50 mL IVPB extended infusion (mini-bag), Q8H  nicotine (NICODERM CQ) 21 MG/24HR 1 patch, Daily  lactobacillus (CULTURELLE) capsule 1 capsule, Daily  pantoprazole (PROTONIX) injection 40 mg, Daily  ondansetron (ZOFRAN) injection 4 mg, Q6H PRN      Current Facility-Administered Medications   Medication Dose Route Frequency Provider Last Rate Last Admin    sodium chloride flush 0.9 % injection 5-40 mL  5-40 mL IntraVENous 2 times per day Rakel Yun MD   10 mL at 09/14/21 0843    sodium chloride flush 0.9 % injection 5-40 mL  5-40 mL IntraVENous PRN Rakel Yun MD        0.9 % sodium chloride infusion  25 mL IntraVENous PRN Rakel Yun  mL/hr at 09/14/21 0639 25 mL at 09/14/21 0639    polyethylene glycol (GLYCOLAX) packet 17 g  17 g Oral Daily PRN Rakel Yun MD        acetaminophen (TYLENOL) tablet 650 mg  650 mg Oral Q6H PRN Rakel Yun MD   650 mg at 09/14/21 8915    Or    acetaminophen (TYLENOL) suppository 650 mg  650 mg Rectal Q6H PRN Rakel Yun MD        heparin (porcine) injection 5,470 Units  80 Units/kg IntraVENous PRN Rakel Yun MD        heparin (porcine) injection 2,740 Units  40 Units/kg IntraVENous PRN Rakel Yun MD       Floydene Ada heparin 25,000 units in dextrose 5% 250 mL (premix) infusion  5-30 Units/kg/hr IntraVENous Continuous Jessica Wilkinson MD 13.7 mL/hr at 09/14/21 0714 20 Units/kg/hr at 09/14/21 0714    morphine sulfate (PF) injection 4 mg  4 mg IntraVENous Q4H PRN Jessica Wilkinson MD   4 mg at 09/14/21 1111    piperacillin-tazobactam (ZOSYN) 3,375 mg in dextrose 5 % 50 mL IVPB extended infusion (mini-bag)  3,375 mg IntraVENous Q8H Jessica Wilkinson MD   Stopped at 09/14/21 1118    nicotine (NICODERM CQ) 21 MG/24HR 1 patch  1 patch TransDERmal Daily MD Angelique   1 patch at 09/14/21 0843    lactobacillus (CULTURELLE) capsule 1 capsule  1 capsule Oral Daily MD Angelique   1 capsule at 09/14/21 1051    pantoprazole (PROTONIX) injection 40 mg  40 mg IntraVENous Daily MD Angelique   40 mg at 09/14/21 1051    ondansetron (ZOFRAN) injection 4 mg  4 mg IntraVENous Q6H PRN Eveline Raza MD   4 mg at 09/14/21 0110     Review of Systems:   · Constitutional: No Fever or Weight Loss   · Eyes: No Decreased Vision  · ENT: No Headaches, Hearing Loss or Vertigo  · Cardiovascular: As per HPI  · Respiratory: As per HPI  · Gastrointestinal: No abdominal pain, appetite loss, blood in stools, constipation, diarrhea or heartburn  · Genitourinary: No dysuria, trouble voiding, or hematuria  · Musculoskeletal:  No gait disturbance, weakness or joint complaints  · Integumentary: No rash or pruritis  · Neurological: No TIA or stroke symptoms  · Psychiatric: No anxiety or depression  · Endocrine: No malaise, fatigue or temperature intolerance  · Hematologic/Lymphatic: No bleeding problems, blood clots or swollen lymph nodes  · Allergic/Immunologic: No nasal congestion or hives  All systems negative except as marked.         Physical Examination:    Vitals:    09/14/21 0400 09/14/21 0500 09/14/21 0600 09/14/21 0700   BP: (!) 124/98 (!) 124/95 (!) 131/96 128/85   Pulse: 111 111 118 122   Resp: 23 20 24 22   Temp: 97.6 °F (36.4 °C)      TempSrc: Oral      SpO2:  92%  92%   Weight:       Height:           General Appearance:  No distress, conversant    Constitutional:  Well developed, Well nourished, No acute distress, Non-toxic appearance. HENT:  Normocephalic, Atraumatic, Bilateral external ears normal, Oropharynx moist, No oral exudates, Nose normal. Neck- Normal range of motion, No tenderness, Supple, No stridor,no apical-carotid delay  Lymphatics : no palpable lymph nodes  Eyes:  PERRL, EOMI, Conjunctiva normal, No discharge. Respiratory:  Normal breath sounds, No respiratory distress, No wheezing, No chest tenderness. ,no use of accessory muscles, crackles Present  Cardiovascular: (PMI) apex non displaced,no lifts no thrills, ankle swelling Present , 1+, s1 and s2 audible,Murmur. Absent , JVD not noted    Abdomen /GI:  Bowel sounds normal, Soft, No tenderness, No masses, No gross visceromegaly   :  No costovertebral angle tenderness   Musculoskeletal:  No edema, no tenderness, no deformities.  Back- no tenderness  Integument:  Well hydrated, no rash   Lymphatic:  No lymphadenopathy noted   Neurologic:  Alert & oriented x 3, CN 2-12 normal, normal motor function, normal sensory function, no focal deficits noted           Medical decision making and Data review:    Lab Review   Recent Labs     09/14/21  0610   WBC 17.4*   HGB 15.9   HCT 50.5         Recent Labs     09/14/21  0121   *   K 4.7   CL 99   CO2 19*   BUN 14   CREATININE 0.9     Recent Labs     09/14/21  0121   AST 13*   ALT 7*   BILITOT 0.6   ALKPHOS 102     Recent Labs     09/13/21  1706 09/14/21  0121 09/14/21  0610   TROPONINT 0.072* 0.036* 0.038*       Recent Labs     09/13/21  1706   PROBNP 6,172*     Lab Results   Component Value Date    INR 1.12 09/13/2021    PROTIME 14.3 09/13/2021       EKG: (reviewed by myself)    ECHO:(reviewed by myself)    Chest Xray:(reviewed by myself)  ECHO Complete 2D W Doppler W Color    Result Date: 9/14/2021  Transthoracic atrium. Right Ventricle  Dilated right ventricle; TAPSE: 14.5 mm, S-wave: 7.64 cm/s suggestive of  right heart strain. Aortic Valve  Trace aortic regurgitation noted. Mitral Valve  Trace mitral regurgitation. Tricuspid Valve  Mild tricuspid regurgitation; RVSP: 31 mmHg. Pulmonic Valve  The pulmonic valve was not well visualized. Pericardial Effusion  No evidence of any pericardial effusion. Pleural Effusion  No evidence of pleural effusion. Miscellaneous  The aorta is within normal limits. Inferior vena cava is dilated, measuring at 2 cm, and does not collapse with  respiration.   M-Mode/2D Measurements & Calculations   LV Diastolic Dimension:  LV Systolic Dimension:  LA Dimension: 2.7 cmAO Root  3.45 cm                  2.42 cm                 Dimension: 3.3 cmLA Area:  LV FS:29.9 %             LV Volume Diastolic: 49 96.9 cm2  LV PW Diastolic: 9.87 cm ml  LV PW Systolic: 2.17 cm  LV Volume Systolic: 22  Septum Diastolic: 2.53   ml  cm                       LV EDV/LV EDV Index: 49 RV Diastolic Dimension:  Septum Systolic: 1.35 cm YL/29 D8GB ESV/LV ESV   5.21 cm  CO: 3.76 l/min           Index: 22 ml/13 m2  CI: 2.28 l/m*m2          EF Calculated (A4C):    LA/Aorta: 0.82                           55.1 %  LV Area Diastolic: 05.8  EF Calculated (2D): 58  LA volume/Index: 24 ml  cm2                      %                       /08T5  LV Area Systolic: 68.7  cm2                      LV Length: 6.77 cm                            LVOT: 2.2 cm  Doppler Measurements & Calculations   MV Peak E-Wave: 56.3 AV Peak Velocity: 101 cm/s    LVOT Peak Velocity: 74.4  cm/s                 AV Peak Gradient: 4.08 mmHg   cm/s  MV Peak A-Wave: 81.4 AV Mean Velocity: 69.5 cm/s   LVOT Mean Velocity: 55.5  cm/s                 AV Mean Gradient: 2 mmHg      cm/s  MV E/A Ratio: 0.69   AV VTI: 11.9 cm               LVOT Peak Gradient: 2  MV Peak Gradient:    AV Area (Continuity):2.92 cm2 mmHgLVOT Mean Gradient: 1  1.27 mmHg mmHg                       LVOT VTI: 9.16 cm             Estimated RVSP: 31 mmHg  MV P1/2t: 21 msec                                  Estimated RAP:3 mmHg  MVA by PHT:10.48 cm2 Estimated PASP: 14.83 mmHg   MV E' Septal                                       TR Velocity:172 cm/s  Velocity: 7.52 cm/s                                TR Gradient:11.83 mmHg  MV E' Lateral  Velocity: 15.2 cm/s  MV E/E' septal: 7.49  MV E/E' lateral: 3.7      CT ABDOMEN PELVIS W IV CONTRAST Additional Contrast? None    Result Date: 9/13/2021  EXAMINATION: CT OF THE ABDOMEN AND PELVIS WITH CONTRAST 9/13/2021 6:21 pm TECHNIQUE: CT of the abdomen and pelvis was performed with the administration of intravenous contrast. Multiplanar reformatted images are provided for review. Dose modulation, iterative reconstruction, and/or weight based adjustment of the mA/kV was utilized to reduce the radiation dose to as low as reasonably achievable. COMPARISON: None. HISTORY: ORDERING SYSTEM PROVIDED HISTORY: abd pain, tachycardia, h/o pancreatitis TECHNOLOGIST PROVIDED HISTORY: Reason for exam:->abd pain, tachycardia, h/o pancreatitis Additional Contrast?->None Decision Support Exception - unselect if not a suspected or confirmed emergency medical condition->Emergency Medical Condition (MA) Reason for Exam: abd pain, tachycardia, h/o pancreatitis, Inj 95cc Isovue 370, GFR >60 FINDINGS: Lower Chest: The lung bases demonstrate left lower lobe consolidation. Organs: The liver, spleen, gallbladder, pancreas and adrenal glands appear unremarkable. There is symmetric enhancement of the kidneys. No hydronephrosis is seen. No ureteral or bladder calculi are noted. GI/Bowel: Evaluation of the bowel is limited as no enteric contrast was given. There are a few fluid-filled nondilated loops of small bowel. The appendix is not dilated. Pelvis: No pelvic masses or fluid collections are seen. Peritoneum/Retroperitoneum:  The unselect if not a suspected or confirmed emergency medical condition->Emergency Medical Condition (MA) Reason for Exam: + trop, elevated bnp, tachycardia, h/o dvt currently, Inj 95cc Isovue 370, GFR >60 FINDINGS: Pulmonary Arteries: There is a saddle embolus involving the junction of the left and right main pulmonary artery. The defect extending into the lobar, segmental or subsegmental branches of the upper and lower lobe pulmonary arteries bilaterally. There does appear to be suggestion of right heart strain with bowing of the interventricular septum and an RV LV ratio greater than 1. Mediastinum: There are few less than 1 cm mediastinal lymph nodes but no definite lymphadenopathy. No evidence of mediastinal lymphadenopathy. The heart and pericardium demonstrate no acute abnormality. There is no acute abnormality of the thoracic aorta. Lungs/pleura: The lung parenchyma demonstrates diffuse emphysematous changes. There is consolidation involving the left lower lobe. There are other scattered areas of atelectasis. No pleural effusions or pneumothoraces are seen. Upper Abdomen: Limited images of the upper abdomen are unremarkable. Soft Tissues/Bones: No acute bone or soft tissue abnormality. 1. Acute saddle pulmonary embolus extending into the upper and lower lobe pulmonary arteries bilaterally. There is a suggestion of right heart strain. These findings were discussed with Dr Angel Juares at 9:00 p.m. 09/13/2021. 2. COPD. 3. Consolidation left lower lobe likely representing a pulmonary infarct. All labs, medications and tests reviewed by myself including data  from outside source , patient and available family . Continue all other medications of all above medical condition listed as is.      Impression:  Active Problems:    Acute pancreatitis    Non compliance with medical treatment    Deep vein thrombosis (DVT) of left lower extremity (HCC)    PAD (peripheral artery disease) (HCC)    Acute deep vein thrombosis (DVT) of proximal vein of left lower extremity (HCC)    Leg swelling    Acute saddle pulmonary embolism (HCC)  Resolved Problems:    * No resolved hospital problems. *      Assessment: 53 y. o.year old with PMH of  has a past medical history of Acute pancreatitis, Alcohol abuse, Depression, H/O angiography, H/O cardiovascular stress test, H/O echocardiogram, History of complete ECG, Hx of blood clots, Hypertension, and Non compliance with medical treatment. 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 . We got urgent echo which is concerning for right heart strain  Also get ultrasound lower extremity  Agree with anticoagulation heparin for now or Lovenox we will switch to Xarelto  Will discuss with interventional cardiology team about potentially doing EKOS  Social work evaluation counseling for substance abuse and alcohol abuse  History of peripheral arterial disease currently stable  High risk to have coronary artery disease as well eventually will need some kind of ischemia work-up          Thank you  much for consult and giving us the opportunity in contributing in the care of this patient. Please feel free to call me for any questions.        Pattie Martínez MD, 9/14/2021 11:19 AM

## 2021-09-14 NOTE — PROGRESS NOTES
Pt to ICU 2108 from [facility]. Pt connected to telemetry and first set of vitals placed into flowsheets. Pt has shorts, socks, wallet, sandals. Bed alarm set and call light within reach. Pt oriented to room.      Electronically signed by ALMA Estrada on 9/13/2021 at 11:52 PM

## 2021-09-14 NOTE — PROCEDURES
Pulmonary Angiogram and EKOS Procedure Note              Procedure:   1. Selective rt pulmonary artery angiogram  2. Selective left pulmonary artery angiogram  3. EKOS catheter directed left pulmonary artery thrombectomy and TPA Infusion  4. Ultrasound guided rt femoral venous access. 5. Right heart catherization for measurement of RA, PA pressure  . Indication: Acute pulmonary embolism and RV strain    Procedure: After written informed consent was obtained, the patient was   brought to the cardiac catheterization suite on empty stomach and positively identified. Patient is prepped and draped in the usual sterile fashion. 2% Xylocaine local anesthesia is given. Right Femoral vein is punctured under ultrasound and cannulated with 6 f Romanian introducer sheath, the right femoral vein is puntured again under ultrasound and 2nd 6 F sheath was introduced into the rt femoral vein. A 4-Romanian angled JR4 catheter was then positioned LEFT pulmonary artery over J wire and then selective right pulmonary angiogram was performed. A 4-Romanian JR 4 angledcatheter was then positioned left pulmonary artery over J wire and then selective left pulmonary angiogram was performed. Decision to perform EKOS directed LEFT pulmonary artery thrombectomy was made and then left THE JR4 catheter was exchanged over magic wire with EKOS catheter and position at the left pulmonay and infusion of TPA was started, the magic wire was advanced over JR4 catheter and tried to position on rt lower pulmonary artery multiple times inorder to exchange it with EKOS cathter however, due to acute take off of right pulmonary artery, it was not possible, attempts were repeated with 3drc cathter and also used whisper wire but not successful, hence RT EKOS was not placed. There were no complications. Patient tolerated the procedure well. The   patient was transferred to the holding area in stable condition.  Findings are reviewed with patient and discussed with the family. Questions answered. Results:    Pulmonary angiogram     RT pulmonary artery: no clot noted in main rt pulmonary trunk  No saddle embolism noted  Left pulmonary artery:  + clot in the lower upper pumonary artery  PA pressure: 19/14 mmHG mean 15mmHg  RA pressure 15/8 mmHg, MEAN of 4mmHG      Impression: Acute sub-massive  PE with large  occlusion of  and left lower pulmonary artery    Recommendations:    EKOS directed thrombolysis of the left pulmonary artery as per protocol with TPA.

## 2021-09-14 NOTE — H&P
HISTORY AND PHYSICAL  (Hospitalist, Internal Medicine)  IDENTIFYING INFORMATION   PATIENT:  Jozef Singh  MRN:  [id number]  ADMIT DATE: 9/13/2021    Patient seen and examined 9/14/2021-00 30 AM  CHIEF COMPLAINT   Transferred from [place] ED for saddle pulmonary embolism    HISTORY OF PRESENT ILLNESS   Jozef Singh is a 53 y.o. male with peripheral vascular disease, history of DVT/PE, noncompliant with anticoagulation, neurofibromatosis, history of pancreatitis, history of alcohol abuse in the past, chronic tobacco dependence, lung mass recent admission 4/2021 for extensive DVT in the left lower extremity s/p venous angioplasty, discharged on Xarelto presented to [facility] ED for abdominal pain, shortness of breath since Saturday. Patient reported that he felt flulike symptoms on Saturday, had periumbilical pain described as someone punching. Also reported pleuritic pain. Patient had the pain constantly on Saturday and Sunday. Patient shortness of breath got worse which prompted him to go to ED. Patient denied any chest pain. Patient reported that because of abdominal pain he was unable to take his medications which included Xarelto. Continues to smoke. At presentation patient was noted to have /103, , RR 20, temp 98, saturating 93% on room air. Later patient was noted to be saturating 88% on room air. Lab work significant for sodium random glucose 116, creatinine 1.1, magnesium 2.1, lactic acid 1.3, lipase 8, alcohol less than 0.01. Urine drug screen positive for amphetamines, cocaine, marijuana. WBC 22.6, hemoglobin 18.4, platelets 069. Rapid Covid negative. UA-not suggestive of infection. Chest x-ray-emphysematous changes, by lateral pulmonary opacities similar to previous exam-appears chronic.   Patient had CTA chest-acute saddle pulmonary embolus extending into the upper and lower pulmonary arteries bilaterally, suggestion of right heart strain. , CT abdomen/pelvis with IV contrast-fluid-filled nondilated loops of small bowel-may represent a small bowel enteritis. DVT extending from the left iliac vein into the inferior vena cava. Cardiology was consulted and patient started on heparin drip. PAST MEDICAL HISTORY PAST SURGICAL HISTORY   peripheral vascular disease, history of DVT/PE, noncompliant with anticoagulation, neurofibromatosis, history of pancreatitis, history of alcohol abuse in the past, chronic tobacco dependence, lung mass recent admission 4/2021 for extensive DVT in the left lower extremity s/p venous angioplasty, discharged on Xarelto None significant   FAMILY HISTORY SOCIAL HISTORY    Reviewed and noncontributory  Admits to smoking 1 pack/day, currently social drinker, was drinking heavily 1-2 years ago, admits to using marijuana occasionally. MEDICATIONS ALLERGIES    Lisinopril 10 mg daily, Xarelto 20 mg daily. No known drug allergies. PAST MEDICAL, SURGICAL, FAMILY, and SOCIAL HISTORY         Past Medical History:   Diagnosis Date    Acute pancreatitis 10/19/2012    admitted to [facility], but left AMA    Alcohol abuse     Depression     H/O angiography 06/15/2021    conclusion:\" patent left femoral vein, moderate to severe stenosis of left common iliac vein    H/O cardiovascular stress test 6/18/2012, 12/27/2010 6/18/2012-Normal Myocardial Perfusion Study. Post stress myocardial perfusion images show a normal pattern of perfusion in all regions. Post stress LV is normal size and function. Normal perfusion in distribution of all coronaries. EF 65%.  H/O echocardiogram 06/21/2012 6/21/2012-LV normal size. Normal LV wall thickness. LV systolic function normal. EF=>55%. LV wall motion normal. The atrial septal defect is small.      History of complete ECG 6/18/2012, 1/6/2012, 12/27/2011    Hx of blood clots     Hypertension     Non compliance with medical treatment      Past Surgical History:   Procedure Laterality Date    CARDIAC CATHETERIZATION  06/15/2021    conclusion:\" patent left femoral vein, moderate to severe stenosis of left common iliac vein    SKIN BIOPSY       Family History   Problem Relation Age of Onset    Diabetes Father     Kidney Disease Father      Family Hx of HTN  Family Hx as reviewed above, otherwise non-contributory  Social History     Socioeconomic History    Marital status:      Spouse name: None    Number of children: 1    Years of education: None    Highest education level: None   Occupational History    Occupation: Manger/cook   Tobacco Use    Smoking status: Current Every Day Smoker     Packs/day: 0.50     Types: Cigarettes    Smokeless tobacco: Never Used   Substance and Sexual Activity    Alcohol use: Yes     Comment: social drinking     Drug use: No    Sexual activity: None   Other Topics Concern    None   Social History Narrative    None     Social Determinants of Health     Financial Resource Strain:     Difficulty of Paying Living Expenses:    Food Insecurity:     Worried About Running Out of Food in the Last Year:     Ran Out of Food in the Last Year:    Transportation Needs:     Lack of Transportation (Medical):      Lack of Transportation (Non-Medical):    Physical Activity:     Days of Exercise per Week:     Minutes of Exercise per Session:    Stress:     Feeling of Stress :    Social Connections:     Frequency of Communication with Friends and Family:     Frequency of Social Gatherings with Friends and Family:     Attends Voodoo Services:     Active Member of Clubs or Organizations:     Attends Club or Organization Meetings:     Marital Status:    Intimate Partner Violence:     Fear of Current or Ex-Partner:     Emotionally Abused:     Physically Abused:     Sexually Abused:        MEDICATIONS   Medications Prior to Admission  Medications Prior to Admission: lisinopril (PRINIVIL;ZESTRIL) 10 MG tablet, Take 1 tablet by mouth daily  rivaroxaban (XARELTO) 20 MG TABS tablet, Take 1 tablet by mouth daily (with breakfast) Once 15mg bid for 21 days is done    Current Medications  Current Facility-Administered Medications   Medication Dose Route Frequency Provider Last Rate Last Admin    sodium chloride flush 0.9 % injection 5-40 mL  5-40 mL IntraVENous 2 times per day Emma Crook MD        sodium chloride flush 0.9 % injection 5-40 mL  5-40 mL IntraVENous PRN Emma Crook MD        0.9 % sodium chloride infusion  25 mL IntraVENous PRN Emma Crook MD        polyethylene glycol (GLYCOLAX) packet 17 g  17 g Oral Daily PRN Emma Crook MD        acetaminophen (TYLENOL) tablet 650 mg  650 mg Oral Q6H PRN Emma Crook MD        Or   [facility] acetaminophen (TYLENOL) suppository 650 mg  650 mg Rectal Q6H PRN Emma Crook MD        heparin (porcine) injection 5,470 Units  80 Units/kg IntraVENous PRN Emma Crook MD        heparin (porcine) injection 2,740 Units  40 Units/kg IntraVENous PRN Emma Crook MD        heparin 25,000 units in dextrose 5% 250 mL (premix) infusion  5-30 Units/kg/hr IntraVENous Continuous Emma Crook MD        ondansetron [facility]) injection 4 mg  4 mg IntraVENous Q6H PRN Ivon Vizcaino MD   4 mg at 09/13/21 2010         Allergies  No Known Allergies    REVIEW OF SYSTEMS   10 point review of systems conducted and pertinent positives and negatives as per HPI. PHYSICAL EXAM     Wt Readings from Last 3 Encounters:   09/13/21 150 lb 12.7 oz (68.4 kg)   06/21/21 160 lb 9.6 oz (72.8 kg)   06/15/21 157 lb (71.2 kg)       Blood pressure 121/89, pulse 115, temperature 97.6 °F (36.4 °C), temperature source Oral, resp. rate 23, height 5' 10\" (1.778 m), weight 150 lb 12.7 oz (68.4 kg), SpO2 93 %. GEN  -Awake, alert, moderate distress due to pain in abdomen. EYES   -PERRL.    HENT  -MM are moist.   RESP -LS CTA equal bilat, no wheezes, rales or rhonchi. Short of breath   C/V  -S1/S2 auscultated, tachycardia without appreciable M/R/G. Pitting edema in bilateral lower extremities, more in the left lower extremity, right foot slightly colder than the left foot. Left foot pulses readily palpable, right foot pulses difficult to palpate. GI  -Abdomen is soft, non-distended, no significant tenderness. No masses or guarding. + BS in all quadrants. Rectal exam deferred.   -No CVA tenderness. Murphy catheter is not present. MS  -B/L extremities strong muscles strength. Full movements. No gross joint deformities. No swelling, intact sensation symmetrical.   SKIN  -Normal coloration, warm, dry. NEURO  - Awake, alert, oriented x 3, no focal deficits. PSYC  - Appropriate affect. LABS AND IMAGING     Results for Carrie Bennett (MRN [id number]) as of 9/14/2021 00:17   Ref.  Range 9/13/2021 17:06   Sodium Latest Ref Range: 135 - 145 MMOL/L 135   Potassium Latest Ref Range: 3.5 - 5.1 MMOL/L 4.9   Chloride Latest Ref Range: 99 - 110 mMol/L 96 (L)   CO2 Latest Ref Range: 21 - 32 MMOL/L 24   BUN Latest Ref Range: 6 - 23 MG/DL 13   Creatinine Latest Ref Range: 0.9 - 1.3 MG/DL 1.1   Anion Gap Latest Ref Range: 4 - 16  15   GFR Non- Latest Ref Range: >60 mL/min/1.73m2 >60   GFR African American Latest Ref Range: >60 mL/min/1.73m2 >60   Glucose Latest Ref Range: 70 - 99 MG/ (H)   Calcium Latest Ref Range: 8.3 - 10.6 MG/DL 10.1   Total Protein Latest Ref Range: 6.4 - 8.2 GM/DL 7.8   Pro-BNP Latest Ref Range: <300 PG/ML 6,172 (H)   Troponin T Latest Ref Range: <0.01 NG/ML 0.072 (H)   Albumin Latest Ref Range: 3.4 - 5.0 GM/DL 4.0   Alk Phos Latest Ref Range: 40 - 129 IU/L 134 (H)   ALT Latest Ref Range: 10 - 40 U/L 7 (L)   AST Latest Ref Range: 15 - 37 IU/L 15   Bilirubin Latest Ref Range: 0.0 - 1.0 MG/DL 1.1 (H)   Lipase Latest Ref Range: 13 - 60 IU/L 8 (L)   WBC Latest Ref Range: 4.0 - 10.5 K/CU MM 22.6 (H)   RBC Latest Ref Range: 4.6 - 6.2 M/CU MM 6.34 (H)   Hemoglobin Quant Latest Ref Range: 13.5 - 18.0 GM/DL 18.4 (H)   Hematocrit Latest Ref Range: 42 - 52 % 56.4 (H)   MCV Latest Ref Range: 78 - 100 FL 89.0   MCH Latest Ref Range: 27 - 31 PG 29.0   MCHC Latest Ref Range: 32.0 - 36.0 % 32.6   MPV Latest Ref Range: 7.5 - 11.1 FL 11.2 (H)   RDW Latest Ref Range: 11.7 - 14.9 % 17.1 (H)   Platelet Count Latest Ref Range: 140 - 440 K/CU    Lymphocyte % Latest Ref Range: 24 - 44 % 7.0 (L)   Monocytes % Latest Ref Range: 0 - 4 % 10.0 (H)   Eosinophils % Latest Ref Range: 0 - 3 % 2.0   Basophils % Latest Ref Range: 0 - 1 % 1.0   Lymphocytes Absolute Latest Units: K/CU MM 1.6   Monocytes Absolute Latest Units: K/CU MM 2.3   Eosinophils Absolute Latest Units: K/CU MM 0.5   Basophils Absolute Latest Units: K/CU MM 0.2   Differential Type Unknown MANUAL DIFFERENTIAL   Segs Relative Latest Ref Range: 36 - 66 % 73.0 (H)   Segs Absolute Latest Units: K/CU MM 16.4   Bands Relative Latest Ref Range: 5 - 11 % 4 (L)   Bands Absolute Latest Units: K/CU MM 0.90   Metamyelocytes Relative Latest Ref Range: 0.0 % 1 (H)   Myelocytes Absolute Latest Units: K/CU MM 0.45   Myelocyte Percent Latest Ref Range: 0.0 % 2 (H)   Anisocytosis Unknown 1+   Metamyelocytes Absolute Latest Units: K/CU MM 0.23   PLT Morphology Unknown GIANT     Results for Sherie Arellano (MRN [id number]) as of 9/14/2021 00:17   Ref.  Range 9/13/2021 17:55   Color, UA Latest Ref Range: YELLOW  DARK YELLOW (A)   Clarity, UA Latest Ref Range: CLEAR  CLOUDY (A)   Bilirubin, Urine Latest Ref Range: NEGATIVE MG/DL LARGE (A)   Ketones, Urine Latest Ref Range: NEGATIVE MG/DL 80 (A)   Specific [place], UA Latest Ref Range: 1.001 - 1.035  1.030   Blood, Urine Latest Ref Range: NEGATIVE  NEGATIVE   Protein, UA Latest Ref Range: NEGATIVE MG/ (A)   Urobilinogen, Urine Latest Ref Range: 0.2 - 1.0 MG/DL 0.2   Leukocyte Esterase, Urine Latest Ref Range: NEGATIVE NEGATIVE   Urinalysis Comments Unknown 4+ MUCAS OBSERVED   Glucose, Urine Latest Ref Range: NEGATIVE MG/DL NEGATIVE   Nitrite Urine, Quantitative Latest Ref Range: NEGATIVE  NEGATIVE   pH, Urine Latest Ref Range: 5.0 - 8.0  5.0   Cast Type Latest Ref Range: NO CAST FORMS SEEN /HPF 1+ HYALINE CASTS OBSERVED   WBC, UA Latest Ref Range: 0 - 2 /HPF NO CELLS SEEN   RBC, UA Latest Ref Range: 0 - 3 /HPF NO CELLS SEEN   Epithelial Cells, UA Latest Units: /HPF 5 SQUAMOUS EPITHELIAL CELLS, 5 RENAL TUBULAR CELLS   Bacteria, UA Latest Ref Range: NEGATIVE /HPF TRACE (A)   Crystal Type Latest Ref Range: NEGATIVE /HPF 1+ STARCH CRYSTALS OBSERVED   Ictotest Unknown POSITIVE     Results for Alicia Plume (MRN [id number]) as of 9/14/2021 00:17   Ref. Range 9/13/2021 18:00   Amphetamines Latest Ref Range: NEGATIVE  UNCONFIRMED POSITIVE (A)   Cocaine Metabolite Latest Ref Range: NEGATIVE  UNCONFIRMED POSITIVE (A)   Barbiturate Screen, Ur Latest Ref Range: NEGATIVE  NEGATIVE   Benzodiazepine Screen, Urine Latest Ref Range: NEGATIVE  NEGATIVE   Cannabinoid Scrn, Ur Latest Ref Range: NEGATIVE  UNCONFIRMED POSITIVE (A)   Opiates, Urine Latest Ref Range: NEGATIVE  NEGATIVE   Oxycodone Latest Ref Range: NEGATIVE  NEGATIVE   Phencyclidine, Urine Latest Ref Range: NEGATIVE  NEGATIVE     Recent Imaging    CT ABDOMEN PELVIS W IV CONTRAST Additional Contrast? None [[id number]] Collected: 09/13/21 2049      Order Status: Completed Updated: 09/13/21 2107     Narrative:       EXAMINATION:   CT OF THE ABDOMEN AND PELVIS WITH CONTRAST 9/13/2021 6:21 pm     TECHNIQUE:   CT of the abdomen and pelvis was performed with the administration of   intravenous contrast. Multiplanar reformatted images are provided for review. Dose modulation, iterative reconstruction, and/or weight based adjustment of   the mA/kV was utilized to reduce the radiation dose to as low as reasonably   achievable. COMPARISON:   None.      HISTORY:   ORDERING SYSTEM PROVIDED HISTORY: abd pain, tachycardia, h/o pancreatitis   TECHNOLOGIST PROVIDED HISTORY:   Reason for exam:->abd pain, tachycardia, h/o pancreatitis   Additional Contrast?->None   Decision Support Exception - unselect if not a suspected or confirmed   emergency medical condition->Emergency Medical Condition (MA)   Reason for Exam: abd pain, tachycardia, h/o pancreatitis, Inj 95cc Isovue   370, GFR >60     FINDINGS:   Lower Chest: The lung bases demonstrate left lower lobe consolidation. Organs: The liver, spleen, gallbladder, pancreas and adrenal glands appear   unremarkable. Dav Car is symmetric enhancement of the kidneys.  No   hydronephrosis is seen.  No ureteral or bladder calculi are noted. GI/Bowel: Evaluation of the bowel is limited as no enteric contrast was   given. Dav Car are a few fluid-filled nondilated loops of small bowel.  The   appendix is not dilated. Pelvis: No pelvic masses or fluid collections are seen. Peritoneum/Retroperitoneum: The abdominal aorta is not aneurysmal.  There are   shotty mesenteric and retroperitoneal lymph nodes but no lymphadenopathy is   seen. [place] is a filling defect noted within the left common iliac vein   extending into the inferior vena cava to the level of the renal veins. Bones/Soft Tissues: No acute bony abnormalities are noted.      Impression:       1. Fluid-filled nondilated loops of small bowel.  This may represent a small   bowel enteritis. 2. Deep venous thrombosis extending from the left iliac vein into the   inferior vena cava.  These findings were discussed with Dr Alka Fierro   9:00 p.m. 09/13/2021.      CTA PULMONARY W CONTRAST [[id number]] Collected: 09/13/21 2055     Order Status: Completed Updated: 09/13/21 2106     Narrative:       EXAMINATION:   CTA OF THE CHEST 9/13/2021 6:21 pm     TECHNIQUE:   CTA of the chest was performed after the administration of intravenous   contrast.  Multiplanar reformatted images are provided for review.  MIP   images are provided for review. Dose modulation, iterative reconstruction,   and/or weight based adjustment of the mA/kV was utilized to reduce the   radiation dose to as low as reasonably achievable. COMPARISON:   None. HISTORY:   ORDERING SYSTEM PROVIDED HISTORY: + trop, elevated bnp, tachycardia, h/o dvt   currently   TECHNOLOGIST PROVIDED HISTORY:   Reason for exam:->+ trop, elevated bnp, tachycardia, h/o dvt currently   Decision Support Exception - unselect if not a suspected or confirmed   emergency medical condition->Emergency Medical Condition (MA)   Reason for Exam: + trop, elevated bnp, tachycardia, h/o dvt currently, Inj   95cc Isovue 370, GFR >60     FINDINGS:   Pulmonary Arteries: There is a saddle embolus involving the junction of the   left and right main pulmonary artery.  The defect extending into the lobar,   segmental or subsegmental branches of the upper and lower lobe pulmonary   arteries bilaterally.  There does appear to be suggestion of right heart   strain with bowing of the interventricular septum and an RV LV ratio greater   than 1. Mediastinum: There are few less than 1 cm mediastinal lymph nodes but no   definite lymphadenopathy.  No evidence of mediastinal lymphadenopathy.  The   heart and pericardium demonstrate no acute abnormality.  There is no acute   abnormality of the thoracic aorta. Lungs/pleura: The lung parenchyma demonstrates diffuse emphysematous changes. There is consolidation involving the left lower lobe.  There are other   scattered areas of atelectasis.  No pleural effusions or pneumothoraces are   seen. Upper Abdomen: Limited images of the upper abdomen are unremarkable. Soft Tissues/Bones: No acute bone or soft tissue abnormality.      Impression:       1. Acute saddle pulmonary embolus extending into the upper and lower lobe   pulmonary arteries bilaterally.  There is a suggestion of right heart strain.    These findings were discussed with Dr Peggy Blackburn at 9:00 p.m. 09/13/2021.   2. COPD. 3. Consolidation left lower lobe likely representing a pulmonary infarct.      XR CHEST PORTABLE [[id number]] Collected: 09/13/21 1749     Order Status: Completed Updated: 09/13/21 1755     Narrative:       EXAMINATION:   ONE XRAY VIEW OF THE CHEST     9/13/2021 5:37 pm     COMPARISON:   04/29/2021     HISTORY:   ORDERING SYSTEM PROVIDED HISTORY: shortness of breath   TECHNOLOGIST PROVIDED HISTORY:   Reason for exam:->shortness of breath   Reason for Exam: shortness of breath     FINDINGS:   Cardiomediastinal silhouette is stable.  No pulmonary vascular congestion or   edema.  Emphysematous changes in the upper lobes.  Bilateral pulmonary   opacities similar to the previous exam.      Impression:       Emphysematous changes again noted     Bilateral pulmonary opacities similar to the previous exam appear chronic     No acute cardiopulmonary process identified            Relevant labs and imaging reviewed    ASSESSMENT AND PLAN   #. Acute saddle embolus :  -CTA chest-acute saddle pulmonary embolus extending into the upper and lower lobe pulmonary arteries bilaterally . Right heart strain.    -Patient is tachycardic with  at rest, trending up to 122 just by sitting up in bed.  -Continue heparin drip   -Cardiology consulted by ED  -N.p.o. until evaluated by cardiology.   -Analgesics, antiemetics as needed. #. h/o recurrent DVT  -LLE-acute DVT of left common femoral vein, popliteal vein, peroneal vein-10/2018  -Repeated DVT-6/1/2021-acute occlusive DVT of the left common femoral vein and deep femoral vein. Partially occlusive acute DVT of the left proximal, mid and distal femoral vein, popliteal vein, proximal PTV. Occlusive acute SVT of the left GSV from the groin to the knee. -Patient is supposed to be on Xarelto-noncompliant.     #. Small bowel enteritis  -Patient reported having diarrhea  -CT abdomen/pelvis-fluid-filled nondilated loops of bowel  -Leukocytosis  -We will start Zosyn empirically.  -Check C. difficile.     #. h/o PE-10/2018-acute segmental and subsegmental right lung pulmonary emboli.     #. HTN-patient noncompliant with medications  -Resume lisinopril when appropriate.     #.  Neurofibromatosis-biopsy-proven     #. PVD  -Patient has seen Dr. Roselia Dorman 4/2019-lost for follow-up     #. COPD :  -CT chest-4/22/2016-moderate upper lung predominant centrilobular and paraseptal emphysema     #.  Lung mass-as per care everywhere-left upper lobe lung nodule-measuring up to 2.9 cm-lesion suspicious for primary bronchogenic carcinoma. S/p biopsy-histoplasmosis.       #. Chronic tobacco dependence       DVT Prophylaxis: On heparin drip   GI Prophylaxis: Not indicated  Code Status: FULL. Discussed with patient. Case d/w ED physician      Patient is critical with high probability of clinical deterioration. 38 minutes of critical care time spent. This time includes time spent at bedside interviewing and examining patient, coordinating care, review of records, discussing with patient regarding diagnosis, treatment, compliance.     Ania Mcclelland MD  Hospitalist, Internal Medicine  9/14/2021 at 12:28 AM

## 2021-09-14 NOTE — PROGRESS NOTES
1.1* 0.6   ALKPHOS 134* 102     COAG   Recent Labs     09/13/21 2113   INR 1.12     POC: No results found for: POCGLU  SigvougglgH2T:No results found for: LABA1C  CARDIAC ENZYMES  No results for input(s): CKTOTAL, CKMB, CKMBINDEX, TROPONINI in the last 72 hours. Troponin:   Recent Labs     09/13/21  1706 09/14/21  0121 09/14/21  0610   TROPONINT 0.072* 0.036* 0.038*     BNP:   Recent Labs     09/13/21 1706   PROBNP 6,172*     U/A:    Lab Results   Component Value Date    COLORU DARK YELLOW 09/13/2021    WBCUA NO CELLS SEEN 09/13/2021    RBCUA NO CELLS SEEN 09/13/2021    MUCUS RARE 04/30/2021    BACTERIA TRACE 09/13/2021    CLARITYU CLOUDY 09/13/2021    SPECGRAV 1.030 09/13/2021    LEUKOCYTESUR NEGATIVE 09/13/2021    BLOODU NEGATIVE 09/13/2021       ECHO Complete 2D W Doppler W Color    Result Date: 9/14/2021  Transthoracic Echocardiography Report (TTE)  Demographics   Patient Name       Meet Moffett   Date of Study       09/14/2021   Date of Birth      1970         Gender              Male   Age                53 year(s)         Race                   Patient Number     [id number]         Room Number         2108   Visit Number       [id number]   Corporate ID       [id number]   Accession Number   [id number]         Mo Jassalivia, [address] [facility]   Ordering Physician Selena Singletary Cea, MD                 Physician           MD  Procedure Type of Study   TTE procedure:ECHOCARDIOGRAM COMPLETE 2D W DOPPLER W COLOR. Procedure Date Date: 09/14/2021 Start: 09:00 AM Study Location: Portable Technical Quality: Fair visualization Indications:Pulmonary embolus.  Patient Status: Routine Height: 60 inches Weight: 150 pounds BSA: 1.65 m2 BMI: 29.29 kg/m2 HR: 108 bpm BP: 128/85 mmHg  Conclusions   Summary  Left ventricular systolic function is normal.  Ejection fraction is visually tachycardia, h/o pancreatitis Additional Contrast?->None Decision Support Exception - unselect if not a suspected or confirmed emergency medical condition->Emergency Medical Condition (MA) Reason for Exam: abd pain, tachycardia, h/o pancreatitis, Inj 95cc Isovue 370, GFR >60 FINDINGS: Lower Chest: The lung bases demonstrate left lower lobe consolidation. Organs: The liver, spleen, gallbladder, pancreas and adrenal glands appear unremarkable. There is symmetric enhancement of the kidneys. No hydronephrosis is seen. No ureteral or bladder calculi are noted. GI/Bowel: Evaluation of the bowel is limited as no enteric contrast was given. There are a few fluid-filled nondilated loops of small bowel. The appendix is not dilated. Pelvis: No pelvic masses or fluid collections are seen. Peritoneum/Retroperitoneum: The abdominal aorta is not aneurysmal.  There are shotty mesenteric and retroperitoneal lymph nodes but no lymphadenopathy is seen. There is a filling defect noted within the left common iliac vein extending into the inferior vena cava to the level of the renal veins. Bones/Soft Tissues: No acute bony abnormalities are noted. 1. Fluid-filled nondilated loops of small bowel. This may represent a small bowel enteritis. 2. Deep venous thrombosis extending from the left iliac vein into the inferior vena cava. These findings were discussed with Dr Gaston Parra  at 9:00 p.m. 09/13/2021. XR CHEST PORTABLE    Result Date: 9/13/2021  EXAMINATION: ONE XRAY VIEW OF THE CHEST 9/13/2021 5:37 pm COMPARISON: 04/29/2021 HISTORY: ORDERING SYSTEM PROVIDED HISTORY: shortness of breath TECHNOLOGIST PROVIDED HISTORY: Reason for exam:->shortness of breath Reason for Exam: shortness of breath FINDINGS: Cardiomediastinal silhouette is stable. No pulmonary vascular congestion or edema. Emphysematous changes in the upper lobes.   Bilateral pulmonary opacities similar to the previous exam.     Emphysematous changes again noted abdomen are unremarkable. Soft Tissues/Bones: No acute bone or soft tissue abnormality. 1. Acute saddle pulmonary embolus extending into the upper and lower lobe pulmonary arteries bilaterally. There is a suggestion of right heart strain. These findings were discussed with Dr Doni Wyatt at 9:00 p.m. 09/13/2021. 2. COPD. 3. Consolidation left lower lobe likely representing a pulmonary infarct.            Delfina Weaver  [facility]

## 2021-09-14 NOTE — CONSULTS
Department of GeneralSurgery   Surgical Service Dr Shahrzad aLwson   Consult Note    Date of Consult: 9/14/21    Critical care consult time: 35 min    Reason for Consult: Saddle pulmonary embolus associated abdominal pain  Requesting Physician: Dr. Rakel Fitzpatrick: Shortness of breath    History Obtained From:  patient    HISTORY OF PRESENT ILLNESS:                The patient is a 53 y.o. male who presents with abdominal pain shortness of breath for a couple days. Patient was initially seen in ED and was subsequently transferred to the ICU and [facility]. Patient is having some pleuritic pain. This started 3 days ago. CTA of the chest showed saddle pulmonary embolus. He was admitted to the ICU for anticoagulation. His abdominal pain has worsened although his CT of the abdomen did not show any intra-abdominal pathology. She denies fever chills. Pain is described as pressure-like, shooting and stabbing. Pain level is 5/10. Past Medical History:    Past Medical History:   Diagnosis Date    Acute pancreatitis 10/19/2012    admitted to [facility], but left AMA    Alcohol abuse     Depression     H/O angiography 06/15/2021    conclusion:\" patent left femoral vein, moderate to severe stenosis of left common iliac vein    H/O cardiovascular stress test 6/18/2012, 12/27/2010 6/18/2012-Normal Myocardial Perfusion Study. Post stress myocardial perfusion images show a normal pattern of perfusion in all regions. Post stress LV is normal size and function. Normal perfusion in distribution of all coronaries. EF 65%.  H/O echocardiogram 06/21/2012 6/21/2012-LV normal size. Normal LV wall thickness. LV systolic function normal. EF=>55%. LV wall motion normal. The atrial septal defect is small.      History of complete ECG 6/18/2012, 1/6/2012, 12/27/2011    Hx of blood clots     Hypertension     Non compliance with medical treatment        Past Surgical History:    Past Surgical History:   Procedure Laterality Date    CARDIAC CATHETERIZATION  06/15/2021    conclusion:\" patent left femoral vein, moderate to severe stenosis of left common iliac vein    SKIN BIOPSY         Current Medications:   No current facility-administered medications for this encounter. Current Outpatient Medications   Medication Sig Dispense Refill    rivaroxaban (XARELTO) 15 MG TABS tablet Take 1 tablet by mouth 2 times daily (with meals) for 19 days 38 tablet 0    dilTIAZem (CARDIZEM CD) 240 MG extended release capsule Take 1 capsule by mouth daily 30 capsule 3    metoprolol tartrate (LOPRESSOR) 25 MG tablet Take 0.5 tablets by mouth 2 times daily 60 tablet 3    [START ON 10/6/2021] rivaroxaban (XARELTO) 20 MG TABS tablet Take 1 tablet by mouth daily (with breakfast) Once 15mg bid for 21 days is done 90 tablet 1       Allergies:  Patient has no known allergies. Social History:   Social History     Socioeconomic History    Marital status:      Spouse name: None    Number of children: 1    Years of education: None    Highest education level: None   Occupational History    Occupation: Manger/cook   Tobacco Use    Smoking status: Current Every Day Smoker     Packs/day: 0.50     Types: Cigarettes    Smokeless tobacco: Never Used   Substance and Sexual Activity    Alcohol use: Yes     Comment: social drinking     Drug use: No    Sexual activity: None   Other Topics Concern    None   Social History Narrative    None     Social Determinants of Health     Financial Resource Strain:     Difficulty of Paying Living Expenses:    Food Insecurity:     Worried About Running Out of Food in the Last Year:     Ran Out of Food in the Last Year:    Transportation Needs:     Lack of Transportation (Medical):      Lack of Transportation (Non-Medical):    Physical Activity:     Days of Exercise per Week:     Minutes of Exercise per Session:    Stress:     Feeling of Stress :    Social Connections:  Frequency of Communication with Friends and Family:     Frequency of Social Gatherings with Friends and Family:     Attends Restorationism Services:     Active Member of Clubs or Organizations:     Attends Club or Organization Meetings:     Marital Status:    Intimate Partner Violence:     Fear of Current or Ex-Partner:     Emotionally Abused:     Physically Abused:     Sexually Abused:        Family History:   Family History   Problem Relation Age of Onset    Diabetes Father     Kidney Disease Father        REVIEW OFSYSTEMS:    Review of Systems   Constitutional: Negative for chills and fever. HENT: Negative for ear pain, mouth sores, sore throat and tinnitus. Eyes: Negative for photophobia, redness and itching. Respiratory: Positive for shortness of breath. Negative for apnea, choking and stridor. Cardiovascular: Negative for chest pain and palpitations. Gastrointestinal: Positive for abdominal pain. Negative for anal bleeding, constipation and rectal pain. Endocrine: Negative for polydipsia. Genitourinary: Negative for enuresis, flank pain and hematuria. Musculoskeletal: Negative for back pain, joint swelling and myalgias. Skin: Negative for color change and pallor. Allergic/Immunologic: Negative for environmental allergies. Neurological: Negative for syncope and speech difficulty. Psychiatric/Behavioral: Negative for confusion and hallucinations. PHYSICAL EXAM:  Vitals:    09/17/21 0553 09/17/21 0808 09/17/21 1228 09/17/21 1235   BP: (!) 132/94 (!) 115/93 (!) 141/93    Pulse: 94 93 112    Resp: 20 18 22    Temp: 98.7 °F (37.1 °C) 98.9 °F (37.2 °C) 98.7 °F (37.1 °C)    TempSrc: Oral Oral Oral    SpO2:  92% (!) 86% 90%   Weight: 157 lb 1.6 oz (71.3 kg)      Height:           Physical Exam  Constitutional:       Appearance: He is well-developed. HENT:      Head: Normocephalic. Eyes:      Pupils: Pupils are equal, round, and reactive to light.    Cardiovascular:      Rate and Rhythm: Normal rate. Pulmonary:      Effort: Pulmonary effort is normal.   Abdominal:      General: There is no distension. Palpations: Abdomen is soft. There is no mass. Tenderness: There is abdominal tenderness. There is no guarding or rebound. Musculoskeletal:         General: Normal range of motion. Cervical back: Normal range of motion and neck supple. Skin:     General: Skin is warm. Neurological:      Mental Status: He is alert and oriented to person, place, and time.            DATA:    CBC with Differential:    Lab Results   Component Value Date    WBC 9.4 09/17/2021    RBC 4.38 09/17/2021    HGB 12.4 09/17/2021    HCT 39.6 09/17/2021     09/17/2021    MCV 90.4 09/17/2021    MCH 28.3 09/17/2021    MCHC 31.3 09/17/2021    RDW 14.6 09/17/2021    SEGSPCT 76.9 09/14/2021    BANDSPCT 4 09/13/2021    LYMPHOPCT 10.3 09/14/2021    MONOPCT 10.1 09/14/2021    MYELOPCT 2 09/13/2021    EOSPCT 2.3 01/06/2012    BASOPCT 0.5 09/14/2021    MONOSABS 1.8 09/14/2021    LYMPHSABS 1.8 09/14/2021    EOSABS 0.2 09/14/2021    BASOSABS 0.1 09/14/2021    DIFFTYPE AUTOMATED DIFFERENTIAL 09/14/2021     CMP:    Lab Results   Component Value Date     09/17/2021    K 3.8 09/17/2021     09/17/2021    CO2 24 09/17/2021    BUN 9 09/17/2021    CREATININE 0.8 09/17/2021    GFRAA >60 09/17/2021    LABGLOM >60 09/17/2021    GLUCOSE 94 09/17/2021    PROT 5.9 09/14/2021    PROT 6.5 01/12/2013    LABALBU 3.4 09/14/2021    CALCIUM 8.0 09/17/2021    BILITOT 0.6 09/14/2021    ALKPHOS 102 09/14/2021    AST 13 09/14/2021    ALT 7 09/14/2021       IMPRESSION:        Patient Active Problem List:     Acute pancreatitis     Hypertension     Non compliance with medical treatment     Tobacco abuse     Alcohol abuse     Mass of skin of left shoulder     Deep vein thrombosis (DVT) of left lower extremity (HCC)     PAD (peripheral artery disease) (HCC)     Acute deep vein thrombosis (DVT) of proximal vein of left lower extremity (HCC)     Leg swelling     Acute saddle pulmonary embolism (HCC)      PLAN:    Patient with abdominal pain of unknown etiology likely referred pain. He does want to eat and denies anorexia. Will allow clears. Patient is status post EKOS procedure. We will closely monitor for now.          Juan Veras MD

## 2021-09-15 LAB
ANION GAP SERPL CALCULATED.3IONS-SCNC: 10 MMOL/L (ref 4–16)
APTT: 35.7 SECONDS (ref 25.1–37.1)
APTT: 50.2 SECONDS (ref 25.1–37.1)
BUN BLDV-MCNC: 14 MG/DL (ref 6–23)
CALCIUM SERPL-MCNC: 8 MG/DL (ref 8.3–10.6)
CHLORIDE BLD-SCNC: 100 MMOL/L (ref 99–110)
CO2: 24 MMOL/L (ref 21–32)
CREAT SERPL-MCNC: 1 MG/DL (ref 0.9–1.3)
FIBRINOGEN LEVEL: 713 MG/DL (ref 196.9–442.1)
FIBRINOGEN LEVEL: 761 MG/DL (ref 196.9–442.1)
GFR AFRICAN AMERICAN: >60 ML/MIN/1.73M2
GFR NON-AFRICAN AMERICAN: >60 ML/MIN/1.73M2
GLUCOSE BLD-MCNC: 97 MG/DL (ref 70–99)
HCT VFR BLD CALC: 42.7 % (ref 42–52)
HCT VFR BLD CALC: 42.8 % (ref 42–52)
HCT VFR BLD CALC: 48 % (ref 42–52)
HEMOGLOBIN: 13.3 GM/DL (ref 13.5–18)
HEMOGLOBIN: 13.4 GM/DL (ref 13.5–18)
HEMOGLOBIN: 14.7 GM/DL (ref 13.5–18)
MCH RBC QN AUTO: 28.7 PG (ref 27–31)
MCH RBC QN AUTO: 28.8 PG (ref 27–31)
MCH RBC QN AUTO: 28.9 PG (ref 27–31)
MCHC RBC AUTO-ENTMCNC: 30.6 % (ref 32–36)
MCHC RBC AUTO-ENTMCNC: 31.1 % (ref 32–36)
MCHC RBC AUTO-ENTMCNC: 31.3 % (ref 32–36)
MCV RBC AUTO: 92.4 FL (ref 78–100)
MCV RBC AUTO: 92.4 FL (ref 78–100)
MCV RBC AUTO: 93.6 FL (ref 78–100)
PDW BLD-RTO: 15 % (ref 11.7–14.9)
PDW BLD-RTO: 15.1 % (ref 11.7–14.9)
PDW BLD-RTO: 15.3 % (ref 11.7–14.9)
PLATELET # BLD: 180 K/CU MM (ref 140–440)
PLATELET # BLD: 182 K/CU MM (ref 140–440)
PLATELET # BLD: 185 K/CU MM (ref 140–440)
PMV BLD AUTO: 10.2 FL (ref 7.5–11.1)
PMV BLD AUTO: 10.6 FL (ref 7.5–11.1)
PMV BLD AUTO: 10.7 FL (ref 7.5–11.1)
POTASSIUM SERPL-SCNC: 4.4 MMOL/L (ref 3.5–5.1)
RBC # BLD: 4.62 M/CU MM (ref 4.6–6.2)
RBC # BLD: 4.63 M/CU MM (ref 4.6–6.2)
RBC # BLD: 5.13 M/CU MM (ref 4.6–6.2)
SODIUM BLD-SCNC: 134 MMOL/L (ref 135–145)
WBC # BLD: 14.2 K/CU MM (ref 4–10.5)
WBC # BLD: 14.8 K/CU MM (ref 4–10.5)
WBC # BLD: 15.5 K/CU MM (ref 4–10.5)

## 2021-09-15 PROCEDURE — 6360000002 HC RX W HCPCS: Performed by: INTERNAL MEDICINE

## 2021-09-15 PROCEDURE — 6370000000 HC RX 637 (ALT 250 FOR IP): Performed by: INTERNAL MEDICINE

## 2021-09-15 PROCEDURE — 2580000003 HC RX 258: Performed by: INTERNAL MEDICINE

## 2021-09-15 PROCEDURE — 85384 FIBRINOGEN ACTIVITY: CPT

## 2021-09-15 PROCEDURE — 2500000003 HC RX 250 WO HCPCS: Performed by: INTERNAL MEDICINE

## 2021-09-15 PROCEDURE — 80048 BASIC METABOLIC PNL TOTAL CA: CPT

## 2021-09-15 PROCEDURE — 85730 THROMBOPLASTIN TIME PARTIAL: CPT

## 2021-09-15 PROCEDURE — 37214 CESSJ THERAPY CATH REMOVAL: CPT | Performed by: INTERNAL MEDICINE

## 2021-09-15 PROCEDURE — 99232 SBSQ HOSP IP/OBS MODERATE 35: CPT | Performed by: PHYSICIAN ASSISTANT

## 2021-09-15 PROCEDURE — 94761 N-INVAS EAR/PLS OXIMETRY MLT: CPT

## 2021-09-15 PROCEDURE — 2700000000 HC OXYGEN THERAPY PER DAY

## 2021-09-15 PROCEDURE — C9113 INJ PANTOPRAZOLE SODIUM, VIA: HCPCS | Performed by: INTERNAL MEDICINE

## 2021-09-15 PROCEDURE — 87040 BLOOD CULTURE FOR BACTERIA: CPT

## 2021-09-15 PROCEDURE — 85027 COMPLETE CBC AUTOMATED: CPT

## 2021-09-15 PROCEDURE — 2000000000 HC ICU R&B

## 2021-09-15 PROCEDURE — 87081 CULTURE SCREEN ONLY: CPT

## 2021-09-15 PROCEDURE — 99233 SBSQ HOSP IP/OBS HIGH 50: CPT | Performed by: INTERNAL MEDICINE

## 2021-09-15 PROCEDURE — 36415 COLL VENOUS BLD VENIPUNCTURE: CPT

## 2021-09-15 PROCEDURE — 37799 UNLISTED PX VASCULAR SURGERY: CPT

## 2021-09-15 RX ORDER — ONDANSETRON 4 MG/1
4 TABLET, ORALLY DISINTEGRATING ORAL EVERY 8 HOURS PRN
Status: DISCONTINUED | OUTPATIENT
Start: 2021-09-15 | End: 2021-09-17 | Stop reason: HOSPADM

## 2021-09-15 RX ORDER — SODIUM CHLORIDE 0.9 % (FLUSH) 0.9 %
5-40 SYRINGE (ML) INJECTION EVERY 12 HOURS SCHEDULED
Status: DISCONTINUED | OUTPATIENT
Start: 2021-09-15 | End: 2021-09-15

## 2021-09-15 RX ORDER — SODIUM CHLORIDE 0.9 % (FLUSH) 0.9 %
5-40 SYRINGE (ML) INJECTION PRN
Status: DISCONTINUED | OUTPATIENT
Start: 2021-09-15 | End: 2021-09-15

## 2021-09-15 RX ORDER — LORAZEPAM 1 MG/1
1 TABLET ORAL EVERY 4 HOURS PRN
Status: DISCONTINUED | OUTPATIENT
Start: 2021-09-15 | End: 2021-09-17 | Stop reason: HOSPADM

## 2021-09-15 RX ORDER — LANOLIN ALCOHOL/MO/W.PET/CERES
100 CREAM (GRAM) TOPICAL DAILY
Status: DISCONTINUED | OUTPATIENT
Start: 2021-09-15 | End: 2021-09-17 | Stop reason: HOSPADM

## 2021-09-15 RX ORDER — ONDANSETRON 2 MG/ML
4 INJECTION INTRAMUSCULAR; INTRAVENOUS EVERY 6 HOURS PRN
Status: DISCONTINUED | OUTPATIENT
Start: 2021-09-15 | End: 2021-09-17 | Stop reason: HOSPADM

## 2021-09-15 RX ORDER — SODIUM CHLORIDE 9 MG/ML
INJECTION, SOLUTION INTRAVENOUS CONTINUOUS PRN
Status: DISCONTINUED | OUTPATIENT
Start: 2021-09-15 | End: 2021-09-15

## 2021-09-15 RX ORDER — SODIUM CHLORIDE 9 MG/ML
25 INJECTION, SOLUTION INTRAVENOUS PRN
Status: DISCONTINUED | OUTPATIENT
Start: 2021-09-15 | End: 2021-09-17 | Stop reason: HOSPADM

## 2021-09-15 RX ORDER — FOLIC ACID 1 MG/1
1 TABLET ORAL DAILY
Status: DISCONTINUED | OUTPATIENT
Start: 2021-09-15 | End: 2021-09-17 | Stop reason: HOSPADM

## 2021-09-15 RX ORDER — VANCOMYCIN 1 G/200ML
1000 INJECTION, SOLUTION INTRAVENOUS EVERY 12 HOURS
Status: DISCONTINUED | OUTPATIENT
Start: 2021-09-15 | End: 2021-09-16

## 2021-09-15 RX ADMIN — HYDROMORPHONE HYDROCHLORIDE 1 MG: 1 INJECTION, SOLUTION INTRAMUSCULAR; INTRAVENOUS; SUBCUTANEOUS at 05:51

## 2021-09-15 RX ADMIN — HYDROMORPHONE HYDROCHLORIDE 1 MG: 1 INJECTION, SOLUTION INTRAMUSCULAR; INTRAVENOUS; SUBCUTANEOUS at 22:36

## 2021-09-15 RX ADMIN — PANTOPRAZOLE SODIUM 40 MG: 40 INJECTION, POWDER, FOR SOLUTION INTRAVENOUS at 08:07

## 2021-09-15 RX ADMIN — VANCOMYCIN 1000 MG: 1 INJECTION, SOLUTION INTRAVENOUS at 14:38

## 2021-09-15 RX ADMIN — SODIUM CHLORIDE 25 ML: 9 INJECTION, SOLUTION INTRAVENOUS at 05:49

## 2021-09-15 RX ADMIN — FOLIC ACID 1 MG: 1 TABLET ORAL at 14:19

## 2021-09-15 RX ADMIN — HYDROMORPHONE HYDROCHLORIDE 1 MG: 1 INJECTION, SOLUTION INTRAMUSCULAR; INTRAVENOUS; SUBCUTANEOUS at 03:55

## 2021-09-15 RX ADMIN — Medication 100 MG: at 14:19

## 2021-09-15 RX ADMIN — HYDROMORPHONE HYDROCHLORIDE 1 MG: 1 INJECTION, SOLUTION INTRAMUSCULAR; INTRAVENOUS; SUBCUTANEOUS at 16:27

## 2021-09-15 RX ADMIN — LORAZEPAM 1 MG: 1 TABLET ORAL at 22:36

## 2021-09-15 RX ADMIN — RIVAROXABAN 15 MG: 15 TABLET, FILM COATED ORAL at 16:27

## 2021-09-15 RX ADMIN — HYDROMORPHONE HYDROCHLORIDE 1 MG: 1 INJECTION, SOLUTION INTRAMUSCULAR; INTRAVENOUS; SUBCUTANEOUS at 20:42

## 2021-09-15 RX ADMIN — Medication 1 CAPSULE: at 08:07

## 2021-09-15 RX ADMIN — HYDROMORPHONE HYDROCHLORIDE 1 MG: 1 INJECTION, SOLUTION INTRAMUSCULAR; INTRAVENOUS; SUBCUTANEOUS at 18:33

## 2021-09-15 RX ADMIN — HYDROMORPHONE HYDROCHLORIDE 1 MG: 1 INJECTION, SOLUTION INTRAMUSCULAR; INTRAVENOUS; SUBCUTANEOUS at 14:19

## 2021-09-15 RX ADMIN — SODIUM CHLORIDE, PRESERVATIVE FREE 10 ML: 5 INJECTION INTRAVENOUS at 03:56

## 2021-09-15 RX ADMIN — HYDROMORPHONE HYDROCHLORIDE 1 MG: 1 INJECTION, SOLUTION INTRAMUSCULAR; INTRAVENOUS; SUBCUTANEOUS at 01:10

## 2021-09-15 RX ADMIN — SODIUM CHLORIDE, PRESERVATIVE FREE 10 ML: 5 INJECTION INTRAVENOUS at 08:07

## 2021-09-15 RX ADMIN — SODIUM CHLORIDE 25 ML: 9 INJECTION, SOLUTION INTRAVENOUS at 20:47

## 2021-09-15 RX ADMIN — PIPERACILLIN AND TAZOBACTAM 3375 MG: 3; .375 INJECTION, POWDER, FOR SOLUTION INTRAVENOUS at 21:32

## 2021-09-15 RX ADMIN — HYDROMORPHONE HYDROCHLORIDE 1 MG: 1 INJECTION, SOLUTION INTRAMUSCULAR; INTRAVENOUS; SUBCUTANEOUS at 12:05

## 2021-09-15 RX ADMIN — HYDROMORPHONE HYDROCHLORIDE 1 MG: 1 INJECTION, SOLUTION INTRAMUSCULAR; INTRAVENOUS; SUBCUTANEOUS at 09:57

## 2021-09-15 RX ADMIN — PIPERACILLIN AND TAZOBACTAM 3375 MG: 3; .375 INJECTION, POWDER, FOR SOLUTION INTRAVENOUS at 05:50

## 2021-09-15 RX ADMIN — PIPERACILLIN AND TAZOBACTAM 3375 MG: 3; .375 INJECTION, POWDER, FOR SOLUTION INTRAVENOUS at 12:24

## 2021-09-15 RX ADMIN — ALTEPLASE: 2.2 INJECTION, POWDER, LYOPHILIZED, FOR SOLUTION INTRAVENOUS at 02:29

## 2021-09-15 RX ADMIN — METOPROLOL TARTRATE 2.5 MG: 5 INJECTION INTRAVENOUS at 20:48

## 2021-09-15 RX ADMIN — SODIUM CHLORIDE 25 ML: 9 INJECTION, SOLUTION INTRAVENOUS at 21:31

## 2021-09-15 ASSESSMENT — PAIN SCALES - GENERAL
PAINLEVEL_OUTOF10: 3
PAINLEVEL_OUTOF10: 7
PAINLEVEL_OUTOF10: 7
PAINLEVEL_OUTOF10: 6
PAINLEVEL_OUTOF10: 5
PAINLEVEL_OUTOF10: 4
PAINLEVEL_OUTOF10: 8
PAINLEVEL_OUTOF10: 6
PAINLEVEL_OUTOF10: 6
PAINLEVEL_OUTOF10: 8
PAINLEVEL_OUTOF10: 8
PAINLEVEL_OUTOF10: 3
PAINLEVEL_OUTOF10: 10
PAINLEVEL_OUTOF10: 7
PAINLEVEL_OUTOF10: 3
PAINLEVEL_OUTOF10: 7
PAINLEVEL_OUTOF10: 7
PAINLEVEL_OUTOF10: 3
PAINLEVEL_OUTOF10: 3
PAINLEVEL_OUTOF10: 5
PAINLEVEL_OUTOF10: 4

## 2021-09-15 ASSESSMENT — PAIN DESCRIPTION - PROGRESSION
CLINICAL_PROGRESSION: GRADUALLY IMPROVING
CLINICAL_PROGRESSION: GRADUALLY IMPROVING
CLINICAL_PROGRESSION: NOT CHANGED
CLINICAL_PROGRESSION: GRADUALLY WORSENING
CLINICAL_PROGRESSION: NOT CHANGED
CLINICAL_PROGRESSION: GRADUALLY WORSENING
CLINICAL_PROGRESSION: NOT CHANGED
CLINICAL_PROGRESSION: NOT CHANGED
CLINICAL_PROGRESSION: GRADUALLY WORSENING

## 2021-09-15 ASSESSMENT — PAIN DESCRIPTION - ORIENTATION
ORIENTATION: MID

## 2021-09-15 ASSESSMENT — PAIN DESCRIPTION - LOCATION
LOCATION: ABDOMEN
LOCATION: ABDOMEN
LOCATION: ABDOMEN;GENERALIZED
LOCATION: ABDOMEN
LOCATION: ABDOMEN;GENERALIZED
LOCATION: ABDOMEN
LOCATION: ABDOMEN;GENERALIZED
LOCATION: ABDOMEN
LOCATION: ABDOMEN

## 2021-09-15 ASSESSMENT — PAIN DESCRIPTION - DESCRIPTORS
DESCRIPTORS: ACHING
DESCRIPTORS: ACHING;DISCOMFORT
DESCRIPTORS: ACHING
DESCRIPTORS: ACHING;DISCOMFORT
DESCRIPTORS: ACHING;DISCOMFORT
DESCRIPTORS: ACHING
DESCRIPTORS: ACHING;DISCOMFORT
DESCRIPTORS: ACHING

## 2021-09-15 ASSESSMENT — PAIN DESCRIPTION - FREQUENCY
FREQUENCY: CONTINUOUS

## 2021-09-15 ASSESSMENT — PAIN DESCRIPTION - ONSET
ONSET: ON-GOING

## 2021-09-15 ASSESSMENT — PAIN DESCRIPTION - PAIN TYPE
TYPE: ACUTE PAIN

## 2021-09-15 ASSESSMENT — ENCOUNTER SYMPTOMS
SHORTNESS OF BREATH: 1
PHOTOPHOBIA: 0
EYE ITCHING: 0
COLOR CHANGE: 0
CHOKING: 0
APNEA: 0
RECTAL PAIN: 0
ANAL BLEEDING: 0
SORE THROAT: 0
STRIDOR: 0
EYE REDNESS: 0
VOMITING: 0
CONSTIPATION: 0
ABDOMINAL PAIN: 1
NAUSEA: 0
BACK PAIN: 0

## 2021-09-15 ASSESSMENT — PAIN - FUNCTIONAL ASSESSMENT
PAIN_FUNCTIONAL_ASSESSMENT: ACTIVITIES ARE NOT PREVENTED
PAIN_FUNCTIONAL_ASSESSMENT: PREVENTS OR INTERFERES SOME ACTIVE ACTIVITIES AND ADLS

## 2021-09-15 NOTE — CARE COORDINATION
CM in to see pt for follow up. Pt on EKOS. Pt provided with AA meeting schedule. Will continue to encourage pt to take meds as prescribed and pursue sobriety. Plan remains home with parent.

## 2021-09-15 NOTE — PROGRESS NOTES
Hospitalist Progress Note         Admit Date: 9/13/2021    PCP: TITUS Foley - CNP     Chief Complaint   Patient presents with    Abdominal Pain    URI        Assessment and Plan:     -Acute PE/DVT continue heparin drip. Echo with right heart strain. Completed EKSO treatment as of today and monitor H&H/repeat echo  -Abdominal pain unsure if acute gastritis/gastroenteritis contributing. Surgery consult. GI panel PCR, C. difficile pending. Pain control, IVF and electrolyte replacement. -2/4 staph positive BC start pharmacy dosed vancomycin and repeat cultures.  -Tobacco abuse smoking cessation counseling provided. Nicoderm patch ordered.  -Polysubstance abuse counseling provided.  -Leukocytosis likely stress related.   Continue monitor      Current Facility-Administered Medications   Medication Dose Route Frequency Provider Last Rate Last Admin    0.9 % sodium chloride infusion  25 mL IntraVENous PRN Va Valerio MD   Stopped at 09/15/21 1224    ondansetron (ZOFRAN-ODT) disintegrating tablet 4 mg  4 mg Oral Q8H PRN Va Valerio MD        Or    ondansetron (ZOFRAN) injection 4 mg  4 mg IntraVENous Q6H PRN Va Valerio MD        LORazepam (ATIVAN) tablet 1 mg  1 mg Oral Q4H PRN Dacia Davis MD        thiamine tablet 100 mg  100 mg Oral Daily Dacia Davis MD   100 mg at 51/95/64 6857    folic acid (FOLVITE) tablet 1 mg  1 mg Oral Daily Dacia Davis MD   1 mg at 09/15/21 1419    rivaroxaban (XARELTO) tablet 15 mg  15 mg Oral BID  Luis Eduardo Hernandez MD   15 mg at 09/15/21 1627    vancomycin (VANCOCIN) 1000 mg in 200 mL IVPB  1,000 mg IntraVENous Q12H Dacia Davis MD   Stopped at 09/15/21 1535    sodium chloride flush 0.9 % injection 5-40 mL  5-40 mL IntraVENous 2 times per day Andie Pascual MD   10 mL at 09/15/21 0807    sodium chloride flush 0.9 % injection 5-40 mL  5-40 mL IntraVENous PRN Andie Pascual MD   10 mL at 09/15/21 4086 M/R/G  Abdomen:     Soft, non-tender, bowel sounds +  Extremities:   No edema/clubbing/cyanosis  Neurological:   Grossly Intact. Significant Diagnostic Studies:   DATA:    CBC   Recent Labs     09/15/21  0538 09/15/21  1130 09/15/21  1411   WBC 14.8* 15.5* 14.2*   HGB 13.3* 13.4* 14.7   HCT 42.7 42.8 48.0    185 182      BMP   Recent Labs     09/13/21  1706 09/14/21  0121 09/15/21  0538    133* 134*   K 4.9 4.7 4.4   CL 96* 99 100   CO2 24 19* 24   BUN 13 14 14   CREATININE 1.1 0.9 1.0     LFT'S   Recent Labs     09/13/21 1706 09/14/21  0121   AST 15 13*   ALT 7* 7*   BILITOT 1.1* 0.6   ALKPHOS 134* 102     COAG   Recent Labs     09/13/21  2113   INR 1.12     POC: No results found for: POCGLU  [id number]:No results found for: LABA1C  CARDIAC ENZYMES  No results for input(s): CKTOTAL, CKMB, CKMBINDEX, TROPONINI in the last 72 hours.   Troponin:   Recent Labs     09/13/21  1706 09/14/21  0121 09/14/21  0610   TROPONINT 0.072* 0.036* 0.038*     BNP:   Recent Labs     09/13/21  1706   PROBNP 6,172*     U/A:    Lab Results   Component Value Date    COLORU DARK YELLOW 09/13/2021    WBCUA NO CELLS SEEN 09/13/2021    RBCUA NO CELLS SEEN 09/13/2021    MUCUS RARE 04/30/2021    BACTERIA TRACE 09/13/2021    CLARITYU CLOUDY 09/13/2021    SPECGRAV 1.030 09/13/2021    LEUKOCYTESUR NEGATIVE 09/13/2021    BLOODU NEGATIVE 09/13/2021       ECHO Complete 2D W Doppler W Color    Result Date: 9/14/2021  Transthoracic Echocardiography Report (TTE)  Demographics   Patient Name       Toy Austin   Date of Study       09/14/2021   Date of Birth      1970         Gender              Male   Age                53 year(s)         Race                   Patient Number     [id number]         Room Number         2108   Visit Number       [id number]   Corporate ID       [id number]   Accession Number   [id number]         Swati Peña Marion Kern   Ordering Physician Jenni Singletary MD                 Physician           MD  Procedure Type of Study   TTE procedure:ECHOCARDIOGRAM COMPLETE 2D W DOPPLER W COLOR. Procedure Date Date: 09/14/2021 Start: 09:00 AM Study Location: Portable Technical Quality: Fair visualization Indications:Pulmonary embolus. Patient Status: Routine Height: 60 inches Weight: 150 pounds BSA: 1.65 m2 BMI: 29.29 kg/m2 HR: 108 bpm BP: 128/85 mmHg  Conclusions   Summary  Left ventricular systolic function is normal.  Ejection fraction is visually estimated at 55-60%. No significant valvular disease noted. Severely dilated right ventricle; TAPSE: 14.5 mm, S-wave: 7.64 cm/s  suggestive of right heart strain. Omero Forte 's sign present  No evidence of any pericardial effusion. Signature   ------------------------------------------------------------------  Electronically signed by Belle Lares MD (Interpreting  physician) on 09/14/2021 at 11:00 AM  ------------------------------------------------------------------   Findings   Left Ventricle  Left ventricular systolic function is normal.  Ejection fraction is visually estimated at 55-60%. Left ventricle size is normal.  No regional wall motion abnormalites. Indeterminate diastolic function; E/A flow reversal is noted. Left Atrium  Essentially normal left atrium. Right Atrium  Essentially normal right atrium. Right Ventricle  Dilated right ventricle; TAPSE: 14.5 mm, S-wave: 7.64 cm/s suggestive of  right heart strain. Aortic Valve  Trace aortic regurgitation noted. Mitral Valve  Trace mitral regurgitation. Tricuspid Valve  Mild tricuspid regurgitation; RVSP: 31 mmHg. Pulmonic Valve  The pulmonic valve was not well visualized. Pericardial Effusion  No evidence of any pericardial effusion. Pleural Effusion  No evidence of pleural effusion. Miscellaneous  The aorta is within normal limits. Inferior vena cava is dilated, measuring at 2 cm, and does not collapse with  respiration.   M-Mode/2D Measurements & Calculations   LV Diastolic Dimension:  LV Systolic Dimension:  LA Dimension: 2.7 cmAO Root  3.45 cm                  2.42 cm                 Dimension: 3.3 cmLA Area:  LV FS:29.9 %             LV Volume Diastolic: 49 20.4 cm2  LV PW Diastolic: 9.45 cm ml  LV PW Systolic: 7.26 cm  LV Volume Systolic: 22  Septum Diastolic: 5.36   ml  cm                       LV EDV/LV EDV Index: 49 RV Diastolic Dimension:  Septum Systolic: 7.42 cm LS/80 I6LN ESV/LV ESV   5.21 cm  CO: 3.76 l/min           Index: 22 ml/13 m2  CI: 2.28 l/m*m2          EF Calculated (A4C):    LA/Aorta: 0.82                           55.1 %  LV Area Diastolic: 27.2  EF Calculated (2D): 58  LA volume/Index: 24 ml  cm2                      %                       /94K4  LV Area Systolic: 98.7  cm2                      LV Length: 6.77 cm                            LVOT: 2.2 cm  Doppler Measurements & Calculations   MV Peak E-Wave: 56.3 AV Peak Velocity: 101 cm/s    LVOT Peak Velocity: 74.4  cm/s                 AV Peak Gradient: 4.08 mmHg   cm/s  MV Peak A-Wave: 81.4 AV Mean Velocity: 69.5 cm/s   LVOT Mean Velocity: 55.5  cm/s                 AV Mean Gradient: 2 mmHg      cm/s  MV E/A Ratio: 0.69   AV VTI: 11.9 cm               LVOT Peak Gradient: 2  MV Peak Gradient:    AV Area (Continuity):2.92 cm2 mmHgLVOT Mean Gradient: 1  1.27 mmHg                                          mmHg                       LVOT VTI: 9.16 cm             Estimated RVSP: 31 mmHg  MV P1/2t: 21 msec                                  Estimated RAP:3 mmHg  MVA by PHT:10.48 cm2 Estimated PASP: 14.83 mmHg   MV E' Septal                                       TR Velocity:172 cm/s  Velocity: 7.52 cm/s                                TR Gradient:11.83 mmHg  MV E' Lateral  Velocity: 15.2 cm/s  MV E/E' septal: 7.49  MV E/E' lateral: 3.7      CT ABDOMEN PELVIS W IV CONTRAST Additional Contrast? None    Result Date: 9/13/2021  EXAMINATION: CT OF THE ABDOMEN AND PELVIS WITH CONTRAST 9/13/2021 6:21 pm TECHNIQUE: CT of the abdomen and pelvis was performed with the administration of intravenous contrast. Multiplanar reformatted images are provided for review. Dose modulation, iterative reconstruction, and/or weight based adjustment of the mA/kV was utilized to reduce the radiation dose to as low as reasonably achievable. COMPARISON: None. HISTORY: ORDERING SYSTEM PROVIDED HISTORY: abd pain, tachycardia, h/o pancreatitis TECHNOLOGIST PROVIDED HISTORY: Reason for exam:->abd pain, tachycardia, h/o pancreatitis Additional Contrast?->None Decision Support Exception - unselect if not a suspected or confirmed emergency medical condition->Emergency Medical Condition (MA) Reason for Exam: abd pain, tachycardia, h/o pancreatitis, Inj 95cc Isovue 370, GFR >60 FINDINGS: Lower Chest: The lung bases demonstrate left lower lobe consolidation. Organs: The liver, spleen, gallbladder, pancreas and adrenal glands appear unremarkable. There is symmetric enhancement of the kidneys. No hydronephrosis is seen. No ureteral or bladder calculi are noted. GI/Bowel: Evaluation of the bowel is limited as no enteric contrast was given. There are a few fluid-filled nondilated loops of small bowel. The appendix is not dilated. Pelvis: No pelvic masses or fluid collections are seen. Peritoneum/Retroperitoneum: The abdominal aorta is not aneurysmal.  There are shotty mesenteric and retroperitoneal lymph nodes but no lymphadenopathy is seen. There is a filling defect noted within the left common iliac vein extending into the inferior vena cava to the level of the renal veins. Bones/Soft Tissues: No acute bony abnormalities are noted. 1. Fluid-filled nondilated loops of small bowel. This may represent a small bowel enteritis. 2. Deep venous thrombosis extending from the left iliac vein into the inferior vena cava. These findings were discussed with Dr Jeremy Shields  at 9:00 p.m. 09/13/2021. XR CHEST PORTABLE    Result Date: 9/13/2021  EXAMINATION: ONE XRAY VIEW OF THE CHEST 9/13/2021 5:37 pm COMPARISON: 04/29/2021 HISTORY: ORDERING SYSTEM PROVIDED HISTORY: shortness of breath TECHNOLOGIST PROVIDED HISTORY: Reason for exam:->shortness of breath Reason for Exam: shortness of breath FINDINGS: Cardiomediastinal silhouette is stable. No pulmonary vascular congestion or edema. Emphysematous changes in the upper lobes. Bilateral pulmonary opacities similar to the previous exam.     Emphysematous changes again noted Bilateral pulmonary opacities similar to the previous exam appear chronic No acute cardiopulmonary process identified     CTA PULMONARY W CONTRAST    Result Date: 9/13/2021  EXAMINATION: CTA OF THE CHEST 9/13/2021 6:21 pm TECHNIQUE: CTA of the chest was performed after the administration of intravenous contrast.  Multiplanar reformatted images are provided for review. MIP images are provided for review. Dose modulation, iterative reconstruction, and/or weight based adjustment of the mA/kV was utilized to reduce the radiation dose to as low as reasonably achievable. COMPARISON: None. HISTORY: ORDERING SYSTEM PROVIDED HISTORY: + trop, elevated bnp, tachycardia, h/o dvt currently TECHNOLOGIST PROVIDED HISTORY: Reason for exam:->+ trop, elevated bnp, tachycardia, h/o dvt currently Decision Support Exception - unselect if not a suspected or confirmed emergency medical condition->Emergency Medical Condition (MA) Reason for Exam: + trop, elevated bnp, tachycardia, h/o dvt currently, Inj 95cc Isovue 370, GFR >60 FINDINGS: Pulmonary Arteries: There is a saddle embolus involving the junction of the left and right main pulmonary artery. The defect extending into the lobar, segmental or subsegmental branches of the upper and lower lobe pulmonary arteries bilaterally.   There does appear to be suggestion of right heart strain with bowing of the interventricular septum and an RV LV ratio greater than 1. Mediastinum: There are few less than 1 cm mediastinal lymph nodes but no definite lymphadenopathy. No evidence of mediastinal lymphadenopathy. The heart and pericardium demonstrate no acute abnormality. There is no acute abnormality of the thoracic aorta. Lungs/pleura: The lung parenchyma demonstrates diffuse emphysematous changes. There is consolidation involving the left lower lobe. There are other scattered areas of atelectasis. No pleural effusions or pneumothoraces are seen. Upper Abdomen: Limited images of the upper abdomen are unremarkable. Soft Tissues/Bones: No acute bone or soft tissue abnormality. 1. Acute saddle pulmonary embolus extending into the upper and lower lobe pulmonary arteries bilaterally. There is a suggestion of right heart strain. These findings were discussed with Dr Chau Birch at 9:00 p.m. 09/13/2021. 2. COPD. 3. Consolidation left lower lobe likely representing a pulmonary infarct.            Danyell Dodge  [facility]

## 2021-09-15 NOTE — PROGRESS NOTES
Dr. Karolina Elaine at bedside, planning to remove EKOS device at 1200 today. Orders to stop TPA at 1130; updated end time on MAR. Orders to leave heparin gtt infusing. Pt updated and agreeable to current plan of care.

## 2021-09-15 NOTE — PROGRESS NOTES
Cardiology Progress Note     Admit Date:  9/13/2021    Consult reason/ Seen today for :   PE     Subjective and  Overnight Events :  S/p ECKOS TPA for saddle embolism , he says he still having pain but Dilaudid every 2 hourly is helping and better      Chief complain on admission : 53 y. o.year old who is admitted for  Chief Complaint   Patient presents with    Abdominal Pain    URI      Assessment / Plan:  Acute PE with saddle embolism , s/p TPA infusion with EKOS catheter removed by myself this morning. Start Xarelto  Bilateral femoral vein thrombus with significant PE burden s/p TPA he has had left femoral vein angioplasty several times noncompliant with anticoagulation hypercoagulable status  Debate IVC filter? High risk for more PEs  Bilateral PE with blood cultures positive for staph  Bacteremia? As per primary team  Consider hematology evaluation to rule out malignancy he may be hypercoagulable with extreme noncompliance  Needs to abstain from cocaine and alcohol  DVT Prophylaxis if no contraindication    Past medical history:    has a past medical history of Acute pancreatitis, Alcohol abuse, Depression, H/O angiography, H/O cardiovascular stress test, H/O echocardiogram, History of complete ECG, Hx of blood clots, Hypertension, and Non compliance with medical treatment. Past surgical history:   has a past surgical history that includes skin biopsy and Cardiac catheterization (06/15/2021). Social History:   reports that he has been smoking cigarettes. He has been smoking about 0.50 packs per day. He has never used smokeless tobacco. He reports current alcohol use. He reports that he does not use drugs. Family history:  family history includes Diabetes in his father; Kidney Disease in his father.     No Known Allergies    Review of Systems:    All 14 systems were reviewed and are negative  Except for the positive findings which as documented     /80   Pulse 116   Temp 98.7 °F (37.1 °C) (Oral)   Resp 19   Ht 5' 10\" (1.778 m)   Wt 150 lb 12.7 oz (68.4 kg)   SpO2 93%   BMI 21.64 kg/m²       Intake/Output Summary (Last 24 hours) at 9/15/2021 1240  Last data filed at 9/15/2021 1224  Gross per 24 hour   Intake 2097.19 ml   Output 850 ml   Net 1247.19 ml     Physical Exam:  Constitutional:  Well developed, Well nourished, No acute distress, Non-toxic appearance. HENT:  Normocephalic, Atraumatic, Bilateral external ears normal, Oropharynx moist, No oral exudates, Nose normal. Neck- Normal range of motion, No tenderness, Supple, No stridor. Eyes:  PERRL, EOMI, Conjunctiva normal, No discharge. Respiratory:  Normal breath sounds, No respiratory distress, No wheezing, No chest tenderness. Cardiovascular:  Normal heart rate, Normal rhythm, No murmurs, No rubs, No gallops, JVP not elevated  Abdomen/GI:  Bowel sounds normal, Soft, No tenderness, No masses, No pulsatile masses. Musculoskeletal:  Intact distal pulses, No edema, No tenderness, No cyanosis, No clubbing. Good range of motion in all major joints. No tenderness to palpation or major deformities noted. Back- No tenderness. Integument:  Warm, Dry, No erythema, No rash. Lymphatic:  No lymphadenopathy noted. Neurologic:  Alert & oriented x 3, Normal motor function, Normal sensory function, No focal deficits noted.    Psychiatric:  Affect  and  Mood :no change    Medications:    sodium chloride flush  5-40 mL IntraVENous 2 times per day    piperacillin-tazobactam  3,375 mg IntraVENous Q8H    nicotine  1 patch TransDERmal Daily    lactobacillus  1 capsule Oral Daily    pantoprazole  40 mg IntraVENous Daily      sodium chloride Stopped (09/15/21 1224)    sodium chloride Stopped (09/15/21 0111)     sodium chloride, ondansetron **OR** ondansetron, sodium chloride flush, sodium chloride, polyethylene glycol, acetaminophen **OR** acetaminophen, HYDROmorphone, ondansetron    Lab Data:  CBC:   Recent Labs     09/14/21  1850 09/15/21  0538 09/15/21  1130   WBC 16.8* 14.8* 15.5*   HGB 14.8 13.3* 13.4*   HCT 47.8 42.7 42.8   MCV 92.8 92.4 92.4    180 185     BMP:   Recent Labs     09/13/21  1706 09/14/21  0121 09/15/21  0538    133* 134*   K 4.9 4.7 4.4   CL 96* 99 100   CO2 24 19* 24   BUN 13 14 14   CREATININE 1.1 0.9 1.0     PT/INR:   Recent Labs     09/13/21  2113   PROTIME 14.3   INR 1.12     BNP:    Recent Labs     09/13/21 1706   PROBNP 6,172*     TROPONIN:   Recent Labs     09/13/21  1706 09/14/21  0121 09/14/21  0610   TROPONINT 0.072* 0.036* 0.038*        ECHO :   echocardiogram    Assessment:  53 y. o.year old who is admitted for  Chief Complaint   Patient presents with    Abdominal Pain    URI    , active issues as noted below:  Impression:  Active Problems:    Acute pancreatitis    Non compliance with medical treatment    Deep vein thrombosis (DVT) of left lower extremity (HCC)    PAD (peripheral artery disease) ([facility] [address]    Acute deep vein thrombosis (DVT) of proximal vein of left lower extremity (HCC)    Leg swelling    Acute saddle pulmonary embolism (HCC)    Elevated troponin  Resolved Problems:    * No resolved hospital problems. *            All labs, medications and tests reviewed by myself , continue all other medications of all above medical condition listed as is except for changes mentioned above. Thank you very much for consult , please call with questions.     Cyndy Crouch MD, MD 9/15/2021 12:40 PM

## 2021-09-15 NOTE — PROGRESS NOTES
Dr. Karolina Elaine at bedside to remove EKOS device. Removed without difficulty. Orders to pull venous sheaths as well. Orders to stop heparin gtt and MIVF connected to EKOS device. Pt tolerated well. Will continue to monitor closely.

## 2021-09-15 NOTE — CONSULTS
Interventional Radiology  History and Physical  Consult Note        Reason for Consult:  Question benefit or removable ivc filter    CHIEF COMPLAINT:  Recurrent DVT and PE    HISTORY OF PRESENT ILLNESS:         The patient is a 53 y.o. male who presents with PE    Past Medical History:        Diagnosis Date    Acute pancreatitis 10/19/2012    admitted to [facility], but left AMA    Alcohol abuse     Depression     H/O angiography 06/15/2021    conclusion:\" patent left femoral vein, moderate to severe stenosis of left common iliac vein    H/O cardiovascular stress test 6/18/2012, 12/27/2010 6/18/2012-Normal Myocardial Perfusion Study. Post stress myocardial perfusion images show a normal pattern of perfusion in all regions. Post stress LV is normal size and function. Normal perfusion in distribution of all coronaries. EF 65%.  H/O echocardiogram 06/21/2012 6/21/2012-LV normal size. Normal LV wall thickness. LV systolic function normal. EF=>55%. LV wall motion normal. The atrial septal defect is small.      History of complete ECG 6/18/2012, 1/6/2012, 12/27/2011    Hx of blood clots     Hypertension     Non compliance with medical treatment      Past Surgical History:        Procedure Laterality Date    CARDIAC CATHETERIZATION  06/15/2021    conclusion:\" patent left femoral vein, moderate to severe stenosis of left common iliac vein    SKIN BIOPSY       Social History:  Non-contributory  Family History:      Problem Relation Age of Onset    Diabetes Father     Kidney Disease Father        No Known Allergies    Current Medications:    Current Facility-Administered Medications: 0.9 % sodium chloride infusion, 25 mL, IntraVENous, PRN  ondansetron (ZOFRAN-ODT) disintegrating tablet 4 mg, 4 mg, Oral, Q8H PRN **OR** ondansetron (ZOFRAN) injection 4 mg, 4 mg, IntraVENous, Q6H PRN  LORazepam (ATIVAN) tablet 1 mg, 1 mg, Oral, Q4H PRN  thiamine tablet 100 mg, 100 mg, Oral, Daily  folic acid (FOLVITE) tablet 1 mg, FINDINGS: large thrombus burden pe    ASSESSMENT/PLAN: I reviewed the relevant imaging, history and physical exam findings. In my opinion, the requested procedure is technically feasible. Based on the information at hand, the patient could benefit from hematology/oncology consultation prior to considering removable ivc filter. Will hold ivc filter placement pending hematology consult. Discussed with Dr. Brii Segovia  Thank you for the consult.

## 2021-09-15 NOTE — PROGRESS NOTES
[address]  consulted by Dr. Inder Ashley for monitoring and adjustment. Indication for treatment: sepsis/ bloodstream infection  Goal trough: 15-20 mcg/mL    Pertinent Laboratory Values:   Temp Readings from Last 3 Encounters:   09/15/21 98.7 °F (37.1 °C) (Oral)   06/15/21 98.5 °F (36.9 °C) (Temporal)   05/01/21 98.2 °F (36.8 °C) (Oral)     Recent Labs     09/13/21  1919 09/14/21  0610 09/14/21  1850 09/15/21  0538 09/15/21  1130   WBC  --    < > 16.8* 14.8* 15.5*   LACTATE 1.3  --   --   --   --     < > = values in this interval not displayed. Recent Labs     09/13/21  1706 09/14/21  0121 09/15/21  0538   BUN 13 14 14   CREATININE 1.1 0.9 1.0     Estimated Creatinine Clearance: 86 mL/min (based on SCr of 1 mg/dL). Intake/Output Summary (Last 24 hours) at 9/15/2021 1333  Last data filed at 9/15/2021 1224  Gross per 24 hour   Intake 2097.19 ml   Output 850 ml   Net 1247.19 ml       Pertinent Cultures:  Date    Source    Results  9/15                         Blood x 2                             pending  9/15                               mrsa nasal scr                     pending    Vancomycin level:   TROUGH:  No results for input(s): VANCOTROUGH in the last 72 hours. RANDOM:  No results for input(s): VANCORANDOM in the last 72 hours. Assessment:  WBC and temperature:   SCr, BUN, and urine output: Renal- stable/ approx. Same, scr= 1.0, crcl= 86; I/O net + 1247ml  Day(s) of therapy: today, day 1  Vancomycin concentration: pending    Plan:  50yom, 68kg  Renal- stable/ approx. Same, scr= 1.0, crcl= 86  Begin Vancomycin as 1000 mg [14.6mg/kg/dose] ivpb q12hr, asap  At Vanco steady state= predicted AUC= 560; Vanco Tr= 17.6  Pharmacy will continue to monitor patient and adjust therapy as indicated    Christopheu 3 9/16 @ 13:00    Thank you for the consult.   Courtney Meo, [facility]  9/15/2021 1:33 PM

## 2021-09-15 NOTE — PROGRESS NOTES
General Surgery  Dr. Jac Dia and Eva Pascual, [facility] Day: 3    ChiefComplaint on Admission: SOB, Abd pain - Saddle PE      Subjective:     Joseph Stafford is a 53 y.o. male with   abd pain. Patient reports abdominal pain is better with increased analgesia, but still rates pain at 7/10. Tolerating ADULT DIET; Regular; No Added Salt (3-4 gm). +ve BM. Pt tachycardic in the 120's. ROS:  Review of Systems   Constitutional: Negative for chills and fever. HENT: Negative for ear pain, mouth sores, sore throat and tinnitus. Eyes: Negative for photophobia, redness and itching. Respiratory: Positive for shortness of breath. Negative for apnea, choking and stridor. Cardiovascular: Negative for chest pain and palpitations. Gastrointestinal: Positive for abdominal pain. Negative for anal bleeding, constipation, nausea, rectal pain and vomiting. Endocrine: Negative for polydipsia. Genitourinary: Negative for enuresis, flank pain and hematuria. Musculoskeletal: Negative for back pain, joint swelling and myalgias. Skin: Negative for color change and pallor. Allergic/Immunologic: Negative for environmental allergies. Neurological: Negative for syncope and speech difficulty. Psychiatric/Behavioral: Negative for confusion and hallucinations. Allergies  Patient has no known allergies. Diagnosis Date    Acute pancreatitis 10/19/2012    admitted to [facility], but left AMA    Alcohol abuse     Depression     H/O angiography 06/15/2021    conclusion:\" patent left femoral vein, moderate to severe stenosis of left common iliac vein    H/O cardiovascular stress test 6/18/2012, 12/27/2010 6/18/2012-Normal Myocardial Perfusion Study. Post stress myocardial perfusion images show a normal pattern of perfusion in all regions. Post stress LV is normal size and function. Normal perfusion in distribution of all coronaries. EF 65%.  H/O echocardiogram 06/21/2012 6/21/2012-LV normal size. flush, sodium chloride, polyethylene glycol, acetaminophen **OR** acetaminophen, HYDROmorphone, ondansetron      Labs/Imaging Results:   Lab Results   Component Value Date    WBC 15.5 (H) 09/15/2021    HGB 13.4 (L) 09/15/2021    HCT 42.8 09/15/2021    MCV 92.4 09/15/2021     09/15/2021     Lab Results   Component Value Date     (L) 09/15/2021    K 4.4 09/15/2021     09/15/2021    CO2 24 09/15/2021    BUN 14 09/15/2021    CREATININE 1.0 09/15/2021    GLUCOSE 97 09/15/2021    CALCIUM 8.0 (L) 09/15/2021    PROT 5.9 (L) 09/14/2021    LABALBU 3.4 09/14/2021    BILITOT 0.6 09/14/2021    ALKPHOS 102 09/14/2021    AST 13 (L) 09/14/2021    ALT 7 (L) 09/14/2021    LABGLOM >60 09/15/2021    GFRAA >60 09/15/2021       Assessment:     Patient Active Problem List:     Acute pancreatitis     Hypertension     Non compliance with medical treatment     Tobacco abuse     Alcohol abuse     Mass of skin of left shoulder     Deep vein thrombosis (DVT) of left lower extremity (HCC)     PAD (peripheral artery disease) (HCC)     Acute deep vein thrombosis (DVT) of proximal vein of left lower extremity (HCC)     Leg swelling     Acute saddle pulmonary embolism (HCC)     Elevated troponin  Abdominal pain    Plan:     No surgical intervention indicated for abd pain. OK for diet as tolerated and serial exams. Will continue to follow.      Jose Rivers PA-C

## 2021-09-15 NOTE — PROGRESS NOTES
Physician Progress Note      Santana Ward  CSN #:                  [id number]  :                       1970  ADMIT DATE:       2021 4:57 PM  [address] [place] DATE:  RESPONDING  PROVIDER #:        Lilly Bolanos MD          QUERY TEXT:    Pt admitted with Saddle embolus of pulmonary artery . Pt noted to have a   pulmonary embolism. If possible, please document in the progress notes and   discharge summary if you are evaluating and / or treating any of the   following: The medical record reflects the following:  Risk Factors: Saddle embolus of pulmonary artery  Clinical Indicators:\"Patient had CTA chest-acute saddle pulmonary embolus   extending into the upper and lower pulmonary arteries bilaterally, suggestion   of right heart strain\" documented in the H&P\"  Treatment: Cardiology consult, Heparin drp, CTA pulmonary with contrast, CT   abdomen    Thank you Vilma Gallo RN, CDS ([phone])  Options provided:  -- Pulmonary Embolism with Acute Cor Pulmonale  -- Pulmonary Embolism without Acute Cor Pulmonale  -- Other - I will add my own diagnosis  -- Disagree - Not applicable / Not valid  -- Disagree - Clinically unable to determine / Unknown  -- Refer to Clinical Documentation Reviewer    PROVIDER RESPONSE TEXT:    This patient has Pulmonary Embolism without acute cor pulmonale.     Query created by: Lilliana Michaels on 2021 9:19 AM      Electronically signed by:  iLlly Bolanos MD 9/15/2021 1:20 PM

## 2021-09-16 LAB
ANION GAP SERPL CALCULATED.3IONS-SCNC: 11 MMOL/L (ref 4–16)
BUN BLDV-MCNC: 8 MG/DL (ref 6–23)
CALCIUM SERPL-MCNC: 7.9 MG/DL (ref 8.3–10.6)
CHLORIDE BLD-SCNC: 99 MMOL/L (ref 99–110)
CO2: 20 MMOL/L (ref 21–32)
CREAT SERPL-MCNC: 0.6 MG/DL (ref 0.9–1.3)
DOSE AMOUNT: NORMAL
DOSE TIME: NORMAL
GFR AFRICAN AMERICAN: >60 ML/MIN/1.73M2
GFR NON-AFRICAN AMERICAN: >60 ML/MIN/1.73M2
GLUCOSE BLD-MCNC: 99 MG/DL (ref 70–99)
HCT VFR BLD CALC: 41.4 % (ref 42–52)
HEMOGLOBIN: 13 GM/DL (ref 13.5–18)
MCH RBC QN AUTO: 29 PG (ref 27–31)
MCHC RBC AUTO-ENTMCNC: 31.4 % (ref 32–36)
MCV RBC AUTO: 92.2 FL (ref 78–100)
PDW BLD-RTO: 14.7 % (ref 11.7–14.9)
PLATELET # BLD: 197 K/CU MM (ref 140–440)
PMV BLD AUTO: 11 FL (ref 7.5–11.1)
POTASSIUM SERPL-SCNC: 4 MMOL/L (ref 3.5–5.1)
RBC # BLD: 4.49 M/CU MM (ref 4.6–6.2)
SODIUM BLD-SCNC: 130 MMOL/L (ref 135–145)
VANCOMYCIN RANDOM: 17.9 UG/ML
WBC # BLD: 13.4 K/CU MM (ref 4–10.5)

## 2021-09-16 PROCEDURE — 6360000002 HC RX W HCPCS: Performed by: INTERNAL MEDICINE

## 2021-09-16 PROCEDURE — 80048 BASIC METABOLIC PNL TOTAL CA: CPT

## 2021-09-16 PROCEDURE — 6370000000 HC RX 637 (ALT 250 FOR IP): Performed by: INTERNAL MEDICINE

## 2021-09-16 PROCEDURE — 2500000003 HC RX 250 WO HCPCS: Performed by: INTERNAL MEDICINE

## 2021-09-16 PROCEDURE — 94761 N-INVAS EAR/PLS OXIMETRY MLT: CPT

## 2021-09-16 PROCEDURE — 36415 COLL VENOUS BLD VENIPUNCTURE: CPT

## 2021-09-16 PROCEDURE — 99233 SBSQ HOSP IP/OBS HIGH 50: CPT | Performed by: INTERNAL MEDICINE

## 2021-09-16 PROCEDURE — 2700000000 HC OXYGEN THERAPY PER DAY

## 2021-09-16 PROCEDURE — 2580000003 HC RX 258: Performed by: INTERNAL MEDICINE

## 2021-09-16 PROCEDURE — 2140000000 HC CCU INTERMEDIATE R&B

## 2021-09-16 PROCEDURE — 94640 AIRWAY INHALATION TREATMENT: CPT

## 2021-09-16 PROCEDURE — 84154 ASSAY OF PSA FREE: CPT

## 2021-09-16 PROCEDURE — 80202 ASSAY OF VANCOMYCIN: CPT

## 2021-09-16 PROCEDURE — 84153 ASSAY OF PSA TOTAL: CPT

## 2021-09-16 PROCEDURE — C9113 INJ PANTOPRAZOLE SODIUM, VIA: HCPCS | Performed by: INTERNAL MEDICINE

## 2021-09-16 PROCEDURE — 99223 1ST HOSP IP/OBS HIGH 75: CPT | Performed by: INTERNAL MEDICINE

## 2021-09-16 PROCEDURE — 85027 COMPLETE CBC AUTOMATED: CPT

## 2021-09-16 RX ORDER — HYDROCODONE BITARTRATE AND ACETAMINOPHEN 5; 325 MG/1; MG/1
1 TABLET ORAL EVERY 4 HOURS PRN
Status: DISCONTINUED | OUTPATIENT
Start: 2021-09-16 | End: 2021-09-17 | Stop reason: HOSPADM

## 2021-09-16 RX ORDER — IPRATROPIUM BROMIDE AND ALBUTEROL SULFATE 2.5; .5 MG/3ML; MG/3ML
1 SOLUTION RESPIRATORY (INHALATION)
Status: DISCONTINUED | OUTPATIENT
Start: 2021-09-16 | End: 2021-09-16

## 2021-09-16 RX ORDER — DILTIAZEM HYDROCHLORIDE 180 MG/1
180 CAPSULE, COATED, EXTENDED RELEASE ORAL DAILY
Status: DISCONTINUED | OUTPATIENT
Start: 2021-09-16 | End: 2021-09-17

## 2021-09-16 RX ORDER — IPRATROPIUM BROMIDE AND ALBUTEROL SULFATE 2.5; .5 MG/3ML; MG/3ML
1 SOLUTION RESPIRATORY (INHALATION) 4 TIMES DAILY PRN
Status: DISCONTINUED | OUTPATIENT
Start: 2021-09-16 | End: 2021-09-17

## 2021-09-16 RX ORDER — ALBUTEROL SULFATE 90 UG/1
2 AEROSOL, METERED RESPIRATORY (INHALATION) 4 TIMES DAILY
Status: DISCONTINUED | OUTPATIENT
Start: 2021-09-16 | End: 2021-09-17

## 2021-09-16 RX ADMIN — HYDROMORPHONE HYDROCHLORIDE 1 MG: 1 INJECTION, SOLUTION INTRAMUSCULAR; INTRAVENOUS; SUBCUTANEOUS at 01:57

## 2021-09-16 RX ADMIN — HYDROMORPHONE HYDROCHLORIDE 0.5 MG: 1 INJECTION, SOLUTION INTRAMUSCULAR; INTRAVENOUS; SUBCUTANEOUS at 18:09

## 2021-09-16 RX ADMIN — RIVAROXABAN 15 MG: 15 TABLET, FILM COATED ORAL at 08:35

## 2021-09-16 RX ADMIN — HYDROMORPHONE HYDROCHLORIDE 1 MG: 1 INJECTION, SOLUTION INTRAMUSCULAR; INTRAVENOUS; SUBCUTANEOUS at 04:51

## 2021-09-16 RX ADMIN — PIPERACILLIN AND TAZOBACTAM 3375 MG: 3; .375 INJECTION, POWDER, FOR SOLUTION INTRAVENOUS at 12:00

## 2021-09-16 RX ADMIN — Medication 100 MG: at 08:35

## 2021-09-16 RX ADMIN — METOPROLOL TARTRATE 2.5 MG: 5 INJECTION INTRAVENOUS at 04:45

## 2021-09-16 RX ADMIN — SODIUM CHLORIDE 25 ML: 9 INJECTION, SOLUTION INTRAVENOUS at 11:58

## 2021-09-16 RX ADMIN — HYDROMORPHONE HYDROCHLORIDE 1 MG: 1 INJECTION, SOLUTION INTRAMUSCULAR; INTRAVENOUS; SUBCUTANEOUS at 08:51

## 2021-09-16 RX ADMIN — METOPROLOL TARTRATE 12.5 MG: 25 TABLET, FILM COATED ORAL at 14:53

## 2021-09-16 RX ADMIN — FOLIC ACID 1 MG: 1 TABLET ORAL at 08:35

## 2021-09-16 RX ADMIN — RIVAROXABAN 15 MG: 15 TABLET, FILM COATED ORAL at 16:37

## 2021-09-16 RX ADMIN — HYDROMORPHONE HYDROCHLORIDE 1 MG: 1 INJECTION, SOLUTION INTRAMUSCULAR; INTRAVENOUS; SUBCUTANEOUS at 11:08

## 2021-09-16 RX ADMIN — SODIUM CHLORIDE 25 ML: 9 INJECTION, SOLUTION INTRAVENOUS at 04:44

## 2021-09-16 RX ADMIN — HYDROCODONE BITARTRATE AND ACETAMINOPHEN 1 TABLET: 5; 325 TABLET ORAL at 16:37

## 2021-09-16 RX ADMIN — HYDROMORPHONE HYDROCHLORIDE 1 MG: 1 INJECTION, SOLUTION INTRAMUSCULAR; INTRAVENOUS; SUBCUTANEOUS at 06:39

## 2021-09-16 RX ADMIN — SODIUM CHLORIDE, PRESERVATIVE FREE 10 ML: 5 INJECTION INTRAVENOUS at 08:39

## 2021-09-16 RX ADMIN — METOPROLOL TARTRATE 2.5 MG: 5 INJECTION INTRAVENOUS at 09:28

## 2021-09-16 RX ADMIN — PIPERACILLIN AND TAZOBACTAM 3375 MG: 3; .375 INJECTION, POWDER, FOR SOLUTION INTRAVENOUS at 06:19

## 2021-09-16 RX ADMIN — VANCOMYCIN 1000 MG: 1 INJECTION, SOLUTION INTRAVENOUS at 03:37

## 2021-09-16 RX ADMIN — Medication 1 CAPSULE: at 08:35

## 2021-09-16 RX ADMIN — HYDROMORPHONE HYDROCHLORIDE 0.5 MG: 1 INJECTION, SOLUTION INTRAMUSCULAR; INTRAVENOUS; SUBCUTANEOUS at 21:59

## 2021-09-16 RX ADMIN — PANTOPRAZOLE SODIUM 40 MG: 40 INJECTION, POWDER, FOR SOLUTION INTRAVENOUS at 08:35

## 2021-09-16 RX ADMIN — ALBUTEROL SULFATE 2 PUFF: 90 AEROSOL, METERED RESPIRATORY (INHALATION) at 20:14

## 2021-09-16 RX ADMIN — SODIUM CHLORIDE 25 ML: 9 INJECTION, SOLUTION INTRAVENOUS at 09:27

## 2021-09-16 RX ADMIN — METOPROLOL TARTRATE 12.5 MG: 25 TABLET, FILM COATED ORAL at 21:54

## 2021-09-16 RX ADMIN — SODIUM CHLORIDE 25 ML: 9 INJECTION, SOLUTION INTRAVENOUS at 06:17

## 2021-09-16 RX ADMIN — DILTIAZEM HYDROCHLORIDE 180 MG: 180 CAPSULE, COATED, EXTENDED RELEASE ORAL at 19:03

## 2021-09-16 ASSESSMENT — PAIN DESCRIPTION - PAIN TYPE
TYPE: ACUTE PAIN

## 2021-09-16 ASSESSMENT — PAIN - FUNCTIONAL ASSESSMENT
PAIN_FUNCTIONAL_ASSESSMENT: PREVENTS OR INTERFERES SOME ACTIVE ACTIVITIES AND ADLS

## 2021-09-16 ASSESSMENT — PAIN DESCRIPTION - PROGRESSION
CLINICAL_PROGRESSION: NOT CHANGED

## 2021-09-16 ASSESSMENT — PAIN SCALES - GENERAL
PAINLEVEL_OUTOF10: 7
PAINLEVEL_OUTOF10: 6
PAINLEVEL_OUTOF10: 6
PAINLEVEL_OUTOF10: 7
PAINLEVEL_OUTOF10: 6
PAINLEVEL_OUTOF10: 4
PAINLEVEL_OUTOF10: 4
PAINLEVEL_OUTOF10: 6
PAINLEVEL_OUTOF10: 3
PAINLEVEL_OUTOF10: 5
PAINLEVEL_OUTOF10: 7
PAINLEVEL_OUTOF10: 6
PAINLEVEL_OUTOF10: 6

## 2021-09-16 ASSESSMENT — PAIN DESCRIPTION - FREQUENCY
FREQUENCY: CONTINUOUS

## 2021-09-16 ASSESSMENT — PAIN DESCRIPTION - LOCATION
LOCATION: ABDOMEN
LOCATION: ABDOMEN
LOCATION: ABDOMEN;GENERALIZED
LOCATION: ABDOMEN;GENERALIZED
LOCATION: ABDOMEN
LOCATION: ABDOMEN;GENERALIZED
LOCATION: ABDOMEN

## 2021-09-16 ASSESSMENT — PAIN DESCRIPTION - ONSET
ONSET: ON-GOING

## 2021-09-16 ASSESSMENT — PAIN DESCRIPTION - ORIENTATION
ORIENTATION: MID

## 2021-09-16 ASSESSMENT — PAIN DESCRIPTION - DESCRIPTORS
DESCRIPTORS: ACHING;DISCOMFORT
DESCRIPTORS: ACHING
DESCRIPTORS: ACHING;DISCOMFORT
DESCRIPTORS: ACHING
DESCRIPTORS: ACHING;DISCOMFORT
DESCRIPTORS: ACHING

## 2021-09-16 NOTE — CONSULTS
ONCOL[facility]Y [facility]ATOLOGY [facility] ([facility])  CONSULTATION REPORT    REASON FOR CONSULT    CHIEF COMPLAINT    Chief Complaint   Patient presents with    Abdominal Pain    URI       HISTORY OF PRESENT ILLNESS   Amandeep Soto is a 53 y.o. male with a past medical history of neurofibromatosis, history of pancreatitis, alcohol abuse, chronic tobacco dependence, lung nodules, history of DVT/PE with last admission 4/2021 s/p venous angioplasty discharged on Xarelto. He presented to ED for abdominal pain, and dyspnea since Saturday. Additionally he had abdominal pain without nausea or vomiting.      9/13/21  Impression   1. Acute saddle pulmonary embolus extending into the upper and lower lobe   pulmonary arteries bilaterally. There is a suggestion of right heart strain. These findings were discussed with Dr Kylee Corral at 9:00 p.m. 09/13/2021.   2. COPD. 3. Consolidation left lower lobe likely representing a pulmonary infarct. 9/14/21  Impression   Occlusive thrombus involving the bilateral distal femoral veins and extending   into the bilateral popliteal veins. On the right, thrombus also may extend   into the proximal portion of the tibioperoneal trunk. Incidental note is also made of an occluded right superficial femoral artery. He is meant to be on Xarelto daily. He reports non compliance with this. He is reports frequent alcohol use as a coping mechanism for depression and anxiety. He is also actively smoking. CT abdomen/pelvis:     9/13/21   Impression   1. Fluid-filled nondilated loops of small bowel. This may represent a small   bowel enteritis. 2. Deep venous thrombosis extending from the left iliac vein into the   inferior vena cava. These findings were discussed with Dr Kylee Corral  at   9:00 p.m. 09/13/2021. We have previously seen this patient for recurrent blood clot in April. Hypercoag workup was reviewed and is negative.      PAST MEDICAL HISTORY    Past Medical History: Diagnosis Date    Acute pancreatitis 10/19/2012    admitted to [facility], but left AMA    Alcohol abuse     Depression     H/O angiography 06/15/2021    conclusion:\" patent left femoral vein, moderate to severe stenosis of left common iliac vein    H/O cardiovascular stress test 6/18/2012, 12/27/2010 6/18/2012-Normal Myocardial Perfusion Study. Post stress myocardial perfusion images show a normal pattern of perfusion in all regions. Post stress LV is normal size and function. Normal perfusion in distribution of all coronaries. EF 65%.  H/O echocardiogram 06/21/2012 6/21/2012-LV normal size. Normal LV wall thickness. LV systolic function normal. EF=>55%. LV wall motion normal. The atrial septal defect is small.      History of complete ECG 6/18/2012, 1/6/2012, 12/27/2011    Hx of blood clots     Hypertension     Non compliance with medical treatment        SURGICAL HISTORY    Past Surgical History:   Procedure Laterality Date    CARDIAC CATHETERIZATION  06/15/2021    conclusion:\" patent left femoral vein, moderate to severe stenosis of left common iliac vein    SKIN BIOPSY         FAMILY HISTORY    Family History   Problem Relation Age of Onset    Diabetes Father     Kidney Disease Father        SOCIAL HISTORY    Social History     Socioeconomic History    Marital status:      Spouse name: None    Number of children: 1    Years of education: None    Highest education level: None   Occupational History    Occupation: Manger/cook   Tobacco Use    Smoking status: Current Every Day Smoker     Packs/day: 0.50     Types: Cigarettes    Smokeless tobacco: Never Used   Substance and Sexual Activity    Alcohol use: Yes     Comment: social drinking     Drug use: No    Sexual activity: None   Other Topics Concern    None   Social History Narrative    None     Social Determinants of Health     Financial Resource Strain:     Difficulty of Paying Living Expenses:    Food Insecurity:     Worried About [address] [facility] in the Last Year:    [address] in the Last Year:    Transportation Needs:     Lack of Transportation (Medical):  Lack of Transportation (Non-Medical):    Physical Activity:     Days of Exercise per Week:     Minutes of Exercise per Session:    Stress:     Feeling of Stress :    Social Connections:     Frequency of Communication with Friends and Family:     Frequency of Social Gatherings with Friends and Family:     Attends Buddhism Services:     Active Member of Clubs or Organizations:     Attends Club or Organization Meetings:     Marital Status:    Intimate Partner Violence:     Fear of Current or Ex-Partner:     Emotionally Abused:     Physically Abused:     Sexually Abused:        REVIEW OF SYSTEMS    Constitutional:  Denies fever, chills, loss of appetite, weight loss, tiredness, fatigue or weakness   HEENT:  Denies swelling of neck glands  Respiratory:  Denies cough, shortness of breath or hemoptysis  Cardiovascular:  Denies chest pain, palpitations or swelling   GI:  Denies abdominal pain, nausea, vomiting, constipation or diarrhea   Musculoskeletal:  Denies back pain   Skin:  Denies rash   Neurologic:  Denies headache, focal weakness or sensory changes   All systems negative except as marked.      PHYSICAL EXAM    Vitals: /83   Pulse 110   Temp 98.9 °F (37.2 °C) (Oral)   Resp 19   Ht 5' 10\" (1.778 m)   Wt 150 lb 12.7 oz (68.4 kg)   SpO2 91%   BMI 21.64 kg/m²   CONSTITUTIONAL: awake, alert, cooperative, no apparent distress   EYES: sclera clear and conjunctiva normal  ENT: Normocephalic, without obvious abnormality, atraumatic  NECK: supple, symmetrical  HEMATOLOGIC/LYMPHATIC: no cervical, supraclavicular or axillary lymphadenopathy   LUNGS:  no increased work of breathing and clear to auscultation   CARDIOVASCULAR: regular rate and rhythm, normal S1 and S2, no murmur noted  ABDOMEN: normal bowel sounds x 4, soft, non-distended, non-tender, no masses palpated, no hepatosplenomgaly   MUSCULOSKELETAL: full range of motion noted, tone is normal  NEUROLOGIC: awake, alert, oriented to name, place and time. Motor skills grossly intact. SKIN: Normal skin color, texture, turgor and no jaundice. Skin appears intact   EXTREMITIES: no LE edema    LABORATORY RESULTS  CBC:   Recent Labs     09/15/21  0538 09/15/21  1130 09/15/21  1411   WBC 14.8* 15.5* 14.2*   HGB 13.3* 13.4* 14.7    185 182     BMP:    Recent Labs     09/13/21  1706 09/14/21  0121 09/15/21  0538    133* 134*   K 4.9 4.7 4.4   CL 96* 99 100   CO2 24 19* 24   BUN 13 14 14   CREATININE 1.1 0.9 1.0   GLUCOSE 116* 99 97     Hepatic:   Recent Labs     09/13/21  1706 09/14/21  0121   AST 15 13*   ALT 7* 7*   BILITOT 1.1* 0.6   ALKPHOS 134* 102     INR:   Recent Labs     09/13/21  2113   INR 1.12     ASSESSMENT    PE/DVT  Depression/Anxiety  Smoking  Alcohol use    RECOMMENDATION    Recurrent DVT/PE-  s/p TPA infusion with EKOS for new saddle embolism. He has had hypercoagulable workup 4/21 which was unremarkable. He has been non-compliant with anticoagulation. We discussed at length the need for compliance and risk for additional clots. We did review warfarin as an option so we can monitor compliance. He has opted to resume Xarelto. We also discussed possible IVC filter if he is unable to adhere to medication regimen. We discussed risk for LE DVT with IVC filter. For now, we recommend continuing Xarelto with very close monitoring with his PCP. We plan to obtain antithrombin III today. Due to concern repeated clots we will evaluate for malignancy. CT chest, abdomen, pelvis were without concern for malignancy. We will obtain PSA today. We will order FOB and recommend screening colonoscopy as an outpatient. Depression- recommend outpatient referral to counseling    I have independently evaluated and examined this patient today.   I have reviewed radiologic and biochemical tests on this patient. Management Plan is developed mutually with Jaswinder Graff NP. I have reviewed above note and agree with assessment and plan    We will follow the patient. Thank you for allowing me to participate in the care of this very pleasant patient.

## 2021-09-16 NOTE — PROGRESS NOTES
Dr. Yesica Youngblood at bedside. Orders to reduce dilaudid to 0.5mg Q 4hrs PRN for severe pain and add Norco 5mg Q 4hrs for moderate pain. Orders for DUONEB tx Q 4hrs PRN.

## 2021-09-16 NOTE — PROGRESS NOTES
Hospitalist Progress Note         Admit Date: 9/13/2021    PCP: TITUS Oliva - CNP     Chief Complaint   Patient presents with    Abdominal Pain    URI        Assessment and Plan:     -Acute PE/DVT DC heparin drip and continue on Xarelto. Echo with right heart strain. Completed EKSO treatment. IVC filter removal decision to be made by oncology. As per oncology hypercoagulable work-up 4/21 unremarkable. Pending antithrombin 3 assay. Oncology recommend screening colonoscopy outpatient and PSA today. Monitor  -Abdominal pain unsure if acute gastritis/gastroenteritis contributing. Look benign today. Surgery did not find any surgical issues and okay advancing diet. Given prior history of   Pancreatitis I have a doubt patient has acute on chronic pancreatitis despite lipase of 8. Started on Norco and reduce the dose of Dilaudid IV. Continue Protonix IV. Improving.  -2/4 staph positive coagulase-negative. DC vancomycin pending repeat blood cultures.  -Concern for chronic alcohol abuse continue Ativan, thiamine/folic acid. Monitor closely  -Tobacco abuse smoking cessation counseling provided. Nicoderm patch ordered.  -Polysubstance abuse counseling provided.  -Leukocytosis likely stress related. Continue monitor  -Tachycardia IV metoprolol changed to p.o and uptitrate as tolerated. Mild troponin elevation likely from tachycardia and RV strain. No additional work-up.       Current Facility-Administered Medications   Medication Dose Route Frequency Provider Last Rate Last Admin    0.9 % sodium chloride infusion  25 mL IntraVENous PRN Serafin Andrea  mL/hr at 09/16/21 0617 25 mL at 09/16/21 0617    ondansetron (ZOFRAN-ODT) disintegrating tablet 4 mg  4 mg Oral Q8H PRRAMON Andrea MD        Or    ondansetron [facility]) injection 4 mg  4 mg IntraVENous Q6H PRN Serafin Andrea MD        LORazepam (ATIVAN) tablet 1 mg  1 mg Oral Q4H PRN MD Angelique   1 mg at 09/15/21 2236    thiamine tablet 100 mg  100 mg Oral Daily Kezia Euceda MD   100 mg at 88/60/92 7764    folic acid (FOLVITE) tablet 1 mg  1 mg Oral Daily Kezia Euceda MD   1 mg at 09/15/21 1419    rivaroxaban (XARELTO) tablet 15 mg  15 mg Oral BID  Landy Oakley MD   15 mg at 09/15/21 1627    vancomycin (VANCOCIN) 1000 mg in 200 mL IVPB  1,000 mg IntraVENous Q12H Kezia Euceda  mL/hr at 09/16/21 0337 1,000 mg at 09/16/21 0337    metoprolol (LOPRESSOR) 2.5 mg in sodium chloride 0.9 % 50 mL IVPB  2.5 mg IntraVENous Q6H Kezia Euceda MD   Stopped at 09/16/21 0515    sodium chloride flush 0.9 % injection 5-40 mL  5-40 mL IntraVENous 2 times per day Makayla Ya MD   10 mL at 09/15/21 0807    sodium chloride flush 0.9 % injection 5-40 mL  5-40 mL IntraVENous PRN Makayla Ya MD   10 mL at 09/15/21 0356    0.9 % sodium chloride infusion  25 mL IntraVENous PRN Makayla Ya  mL/hr at 09/15/21 2131 25 mL at 09/15/21 2131    polyethylene glycol (GLYCOLAX) packet 17 g  17 g Oral Daily PRN Makayla Ya MD        acetaminophen (TYLENOL) tablet 650 mg  650 mg Oral Q6H PRN Makayla Ya MD   650 mg at 09/14/21 7703    Or    acetaminophen (TYLENOL) suppository 650 mg  650 mg Rectal Q6H PRN Makayla Ya MD        piperacillin-tazobactam (ZOSYN) 3,375 mg in dextrose 5 % 50 mL IVPB extended infusion (mini-bag)  3,375 mg IntraVENous Q8H Makayla Ya MD 12.5 mL/hr at 09/16/21 0619 3,375 mg at 09/16/21 0619    nicotine (NICODERM CQ) 21 MG/24HR 1 patch  1 patch TransDERmal Daily Kezia Euceda MD   1 patch at 09/15/21 0807    lactobacillus (CULTURELLE) capsule 1 capsule  1 capsule Oral Daily Kezia Euceda MD   1 capsule at 09/15/21 0807    pantoprazole (PROTONIX) injection 40 mg  40 mg IntraVENous Daily Kezia Euceda MD   40 mg at 09/15/21 0807    HYDROmorphone (DILAUDID) injection 1 mg  1 mg IntraVENous Q2H PRN Radha Schroeder Karolina Ealine MD   1 mg at 09/16/21 0639    ondansetron (ZOFRAN) injection 4 mg  4 mg IntraVENous Q6H PRN Esteban Layne MD   4 mg at 09/14/21 0110       Subjective:     Patient reports improved abdominal pain  Diarrhea improved. Tolerating diet now. No significant acute overnight events  Still tachycardic up to 110s on telemetry    Objective: Intake/Output Summary (Last 24 hours) at 9/16/2021 0745  Last data filed at 9/15/2021 1700  Gross per 24 hour   Intake 2337.99 ml   Output 300 ml   Net 2037.99 ml      Vitals:   Vitals:    09/16/21 0400   BP: 112/83   Pulse: 110   Resp: 19   Temp: 98.9 °F (37.2 °C)   SpO2: 91%     Physical Exam:  General Appearance:    Alert, cooperative, not in distress  Head:      NC/AT, PERRLA, EOMI +  Eyes:       Conjunctiva/corneas clear, EOM's intact  Lungs:    Clear to auscultation bilaterally, respirations unlabored  Heart:                Tachycardia with RRR, S1 and S2 normal, no M/R/G  Abdomen:     Soft, non-tender, bowel sounds +  Extremities:   No edema/clubbing/cyanosis  Neurological:   Grossly Intact. Significant Diagnostic Studies:   DATA:    CBC   Recent Labs     09/15/21  0538 09/15/21  1130 09/15/21  1411   WBC 14.8* 15.5* 14.2*   HGB 13.3* 13.4* 14.7   HCT 42.7 42.8 48.0    185 182      BMP   Recent Labs     09/13/21  1706 09/14/21  0121 09/15/21  0538    133* 134*   K 4.9 4.7 4.4   CL 96* 99 100   CO2 24 19* 24   BUN 13 14 14   CREATININE 1.1 0.9 1.0     LFT'S   Recent Labs     09/13/21  1706 09/14/21  0121   AST 15 13*   ALT 7* 7*   BILITOT 1.1* 0.6   ALKPHOS 134* 102     COAG   Recent Labs     09/13/21  2113   INR 1.12     POC: No results found for: POCGLU  [id number]:No results found for: LABA1C  CARDIAC ENZYMES  No results for input(s): CKTOTAL, CKMB, CKMBINDEX, TROPONINI in the last 72 hours.   Troponin:   Recent Labs     09/13/21  1706 09/14/21  0121 09/14/21  0610   TROPONINT 0.072* 0.036* 0.038*     BNP:   Recent Labs     09/13/21  1706   PROBNP 6,172*     U/A:    Lab Results   Component Value Date    COLORU DARK YELLOW 09/13/2021    WBCUA NO CELLS SEEN 09/13/2021    RBCUA NO CELLS SEEN 09/13/2021    MUCUS RARE 04/30/2021    BACTERIA TRACE 09/13/2021    CLARITYU CLOUDY 09/13/2021    SPECGRAV 1.030 09/13/2021    LEUKOCYTESUR NEGATIVE 09/13/2021    BLOODU NEGATIVE 09/13/2021       ECHO Complete 2D W Doppler W Color    Result Date: 9/14/2021  Transthoracic Echocardiography Report (TTE)  Demographics   Patient Name       Manuel Lopez   Date of Study       09/14/2021   Date of Birth      1970         Gender              Male   Age                53 year(s)         Race                   Patient Number     [id number]         Room Number         2108   Visit Number       [id number]   Corporate ID       [id number]   Accession Number   [id number]         [address]   Ordering Physician Pollo Singletary MD                 Physician           MD  Procedure Type of Study   TTE procedure:ECHOCARDIOGRAM COMPLETE 2D W DOPPLER W COLOR. Procedure Date Date: 09/14/2021 Start: 09:00 AM Study Location: Portable Technical Quality: Fair visualization Indications:Pulmonary embolus. Patient Status: Routine Height: 60 inches Weight: 150 pounds BSA: 1.65 m2 BMI: 29.29 kg/m2 HR: 108 bpm BP: 128/85 mmHg  Conclusions   Summary  Left ventricular systolic function is normal.  Ejection fraction is visually estimated at 55-60%. No significant valvular disease noted. Severely dilated right ventricle; TAPSE: 14.5 mm, S-wave: 7.64 cm/s  suggestive of right heart strain. [place] 's sign present  No evidence of any pericardial effusion.    Signature   ------------------------------------------------------------------  Electronically signed by Elizabeth Plummer MD (Interpreting  physician) on 09/14/2021 at 11:00 AM ------------------------------------------------------------------   Findings   Left Ventricle  Left ventricular systolic function is normal.  Ejection fraction is visually estimated at 55-60%. Left ventricle size is normal.  No regional wall motion abnormalites. Indeterminate diastolic function; E/A flow reversal is noted. Left Atrium  Essentially normal left atrium. Right Atrium  Essentially normal right atrium. Right Ventricle  Dilated right ventricle; TAPSE: 14.5 mm, S-wave: 7.64 cm/s suggestive of  right heart strain. Aortic Valve  Trace aortic regurgitation noted. Mitral Valve  Trace mitral regurgitation. Tricuspid Valve  Mild tricuspid regurgitation; RVSP: 31 mmHg. Pulmonic Valve  The pulmonic valve was not well visualized. Pericardial Effusion  No evidence of any pericardial effusion. Pleural Effusion  No evidence of pleural effusion. Miscellaneous  The aorta is within normal limits. Inferior vena cava is dilated, measuring at 2 cm, and does not collapse with  respiration.   M-Mode/2D Measurements & Calculations   LV Diastolic Dimension:  LV Systolic Dimension:  LA Dimension: 2.7 cmAO Root  3.45 cm                  2.42 cm                 Dimension: 3.3 cmLA Area:  LV FS:29.9 %             LV Volume Diastolic: 49 30.3 cm2  LV PW Diastolic: 6.20 cm ml  LV PW Systolic: 4.76 cm  LV Volume Systolic: 22  Septum Diastolic: 4.92   ml  cm                       LV EDV/LV EDV Index: 49 RV Diastolic Dimension:  Septum Systolic: 2.78 cm CU/03 R7WE ESV/LV ESV   5.21 cm  CO: 3.76 l/min           Index: 22 ml/13 m2  CI: 2.28 l/m*m2          EF Calculated (A4C):    LA/Aorta: 0.82                           55.1 %  LV Area Diastolic: 66.3  EF Calculated (2D): 58  LA volume/Index: 24 ml  cm2                      %                       /11G1  LV Area Systolic: 04.6  cm2                      LV Length: 6.77 cm                            LVOT: 2.2 cm  Doppler Measurements & Calculations   MV Peak E-Wave: 56.3 AV Peak Velocity: 101 cm/s    LVOT Peak Velocity: 74.4  cm/s                 AV Peak Gradient: 4.08 mmHg   cm/s  MV Peak A-Wave: 81.4 AV Mean Velocity: 69.5 cm/s   LVOT Mean Velocity: 55.5  cm/s                 AV Mean Gradient: 2 mmHg      cm/s  MV E/A Ratio: 0.69   AV VTI: 11.9 cm               LVOT Peak Gradient: 2  MV Peak Gradient:    AV Area (Continuity):2.92 cm2 mmHgLVOT Mean Gradient: 1  1.27 mmHg                                          mmHg                       LVOT VTI: 9.16 cm             Estimated RVSP: 31 mmHg  MV P1/2t: 21 msec                                  Estimated RAP:3 mmHg  MVA by PHT:10.48 cm2 Estimated PASP: 14.83 mmHg   MV E' Septal                                       TR Velocity:172 cm/s  Velocity: 7.52 cm/s                                TR Gradient:11.83 mmHg  MV E' Lateral  Velocity: 15.2 cm/s  MV E/E' septal: 7.49  MV E/E' lateral: 3.7      CT ABDOMEN PELVIS W IV CONTRAST Additional Contrast? None    Result Date: 9/13/2021  EXAMINATION: CT OF THE ABDOMEN AND PELVIS WITH CONTRAST 9/13/2021 6:21 pm TECHNIQUE: CT of the abdomen and pelvis was performed with the administration of intravenous contrast. Multiplanar reformatted images are provided for review. Dose modulation, iterative reconstruction, and/or weight based adjustment of the mA/kV was utilized to reduce the radiation dose to as low as reasonably achievable. COMPARISON: None. HISTORY: ORDERING SYSTEM PROVIDED HISTORY: abd pain, tachycardia, h/o pancreatitis TECHNOLOGIST PROVIDED HISTORY: Reason for exam:->abd pain, tachycardia, h/o pancreatitis Additional Contrast?->None Decision Support Exception - unselect if not a suspected or confirmed emergency medical condition->Emergency Medical Condition (MA) Reason for Exam: abd pain, tachycardia, h/o pancreatitis, Inj 95cc Isovue 370, GFR >60 FINDINGS: Lower Chest: The lung bases demonstrate left lower lobe consolidation. Organs:  The liver, spleen, gallbladder, pancreas and adrenal glands appear unremarkable. There is symmetric enhancement of the kidneys. No hydronephrosis is seen. No ureteral or bladder calculi are noted. GI/Bowel: Evaluation of the bowel is limited as no enteric contrast was given. There are a few fluid-filled nondilated loops of small bowel. The appendix is not dilated. Pelvis: No pelvic masses or fluid collections are seen. Peritoneum/Retroperitoneum: The abdominal aorta is not aneurysmal.  There are shotty mesenteric and retroperitoneal lymph nodes but no lymphadenopathy is seen. There is a filling defect noted within the left common iliac vein extending into the inferior vena cava to the level of the renal veins. Bones/Soft Tissues: No acute bony abnormalities are noted. 1. Fluid-filled nondilated loops of small bowel. This may represent a small bowel enteritis. 2. Deep venous thrombosis extending from the left iliac vein into the inferior vena cava. These findings were discussed with Dr Dwayne Álvarez  at 9:00 p.m. 09/13/2021. XR CHEST PORTABLE    Result Date: 9/13/2021  EXAMINATION: ONE XRAY VIEW OF THE CHEST 9/13/2021 5:37 pm COMPARISON: 04/29/2021 HISTORY: ORDERING SYSTEM PROVIDED HISTORY: shortness of breath TECHNOLOGIST PROVIDED HISTORY: Reason for exam:->shortness of breath Reason for Exam: shortness of breath FINDINGS: Cardiomediastinal silhouette is stable. No pulmonary vascular congestion or edema. Emphysematous changes in the upper lobes. Bilateral pulmonary opacities similar to the previous exam.     Emphysematous changes again noted Bilateral pulmonary opacities similar to the previous exam appear chronic No acute cardiopulmonary process identified     CTA PULMONARY W CONTRAST    Result Date: 9/13/2021  EXAMINATION: CTA OF THE CHEST 9/13/2021 6:21 pm TECHNIQUE: CTA of the chest was performed after the administration of intravenous contrast.  Multiplanar reformatted images are provided for review. MIP images are provided for review. Dose modulation, iterative reconstruction, and/or weight based adjustment of the mA/kV was utilized to reduce the radiation dose to as low as reasonably achievable. COMPARISON: None. HISTORY: ORDERING SYSTEM PROVIDED HISTORY: + trop, elevated bnp, tachycardia, h/o dvt currently TECHNOLOGIST PROVIDED HISTORY: Reason for exam:->+ trop, elevated bnp, tachycardia, h/o dvt currently Decision Support Exception - unselect if not a suspected or confirmed emergency medical condition->Emergency Medical Condition (MA) Reason for Exam: + trop, elevated bnp, tachycardia, h/o dvt currently, Inj 95cc Isovue 370, GFR >60 FINDINGS: Pulmonary Arteries: There is a saddle embolus involving the junction of the left and right main pulmonary artery. The defect extending into the lobar, segmental or subsegmental branches of the upper and lower lobe pulmonary arteries bilaterally. There does appear to be suggestion of right heart strain with bowing of the interventricular septum and an RV LV ratio greater than 1. Mediastinum: There are few less than 1 cm mediastinal lymph nodes but no definite lymphadenopathy. No evidence of mediastinal lymphadenopathy. The heart and pericardium demonstrate no acute abnormality. There is no acute abnormality of the thoracic aorta. Lungs/pleura: The lung parenchyma demonstrates diffuse emphysematous changes. There is consolidation involving the left lower lobe. There are other scattered areas of atelectasis. No pleural effusions or pneumothoraces are seen. Upper Abdomen: Limited images of the upper abdomen are unremarkable. Soft Tissues/Bones: No acute bone or soft tissue abnormality. 1. Acute saddle pulmonary embolus extending into the upper and lower lobe pulmonary arteries bilaterally. There is a suggestion of right heart strain. These findings were discussed with Dr Johanna Araujo at 9:00 p.m. 09/13/2021. 2. COPD. 3. Consolidation left lower lobe likely representing a pulmonary infarct. Delia Peralta Hospitalist

## 2021-09-16 NOTE — PROGRESS NOTES
[address] [facility]  consulted by Dr. Earle Eaton for monitoring and adjustment. Indication for treatment: Bacteremia, sepsis  Goal trough: 15-20 mcg/mL    Pertinent Laboratory Values:   Temp Readings from Last 3 Encounters:   09/16/21 98.8 °F (37.1 °C) (Oral)   06/15/21 98.5 °F (36.9 °C) (Temporal)   05/01/21 98.2 °F (36.8 °C) (Oral)     Recent Labs     09/13/21  1919 09/14/21  0610 09/15/21  1130 09/15/21  1411 09/16/21  0633   WBC  --    < > 15.5* 14.2* 13.4*   LACTATE 1.3  --   --   --   --     < > = values in this interval not displayed. Recent Labs     09/14/21  0121 09/15/21  0538 09/16/21  0633   BUN 14 14 8   CREATININE 0.9 1.0 0.6*     Estimated Creatinine Clearance: 144 mL/min (A) (based on SCr of 0.6 mg/dL (L)). Intake/Output Summary (Last 24 hours) at 9/16/2021 1259  Last data filed at 9/15/2021 1700  Gross per 24 hour   Intake 240.8 ml   Output --   Net 240.8 ml       Pertinent Cultures:  Date    Source    Results  9/13   Blood    Staph sp. 2/4  9/15   Blood    Ordered  9/15   MRSA nasal   Ordered    Vancomycin level:   TROUGH:  No results for input(s): VANCOTROUGH in the last 72 hours. RANDOM:    Recent Labs     09/16/21  0630   VANCORANDOM 17.9       Assessment:  · WBC and temperature: WBC trending down; afebrile  · SCr, BUN, and urine output:   · Possibly/slight JORGE on admission, Scr trends improving  · Scr baseline 0.8  · Day(s) of therapy: 2  · Vancomycin concentration:  · 9/16: 17.9, 4h post-dose, , therapeutic    Plan:  · Vancomycin 1000 mg IVPB q12h  · Collected level 17.9, 4h after 1st dose  · Predicted (12h) level: 10.8,   · Repeat level in 72h to monitor for accumulation  · Pharmacy will continue to monitor patient and adjust therapy as indicated    [address] 9/19 @ 0200    Thank you for the consult.   Enid Espinoza, [facility], PharmD   9/16/2021 12:59 PM

## 2021-09-16 NOTE — PROGRESS NOTES
Cardiology Progress Note     Admit Date:  9/13/2021    Consult reason/ Seen today for :   PE     Subjective and  Overnight Events : He has been tachycardic with heart rate in low 110s s/p ECKOS TPA for saddle embolism , he says he still having pain but Dilaudid every 2 hourly is helping and better  Evaluated by IR and oncology getting further work-up      Chief complain on admission : 53 y. o.year old who is admitted for  Chief Complaint   Patient presents with    Abdominal Pain    URI      Assessment / Plan:  Acute PE with saddle embolism , s/p TPA infusion with EKOS catheter removed by myself this morning. Xarelto as per protocol  Bilateral femoral vein thrombus with significant PE burden s/p TPA he has had left femoral vein angioplasty several times noncompliant with anticoagulation hypercoagulable status  Debate IVC filter? High risk for more PEs discussed with IR extensively we will wait for oncology opinion  Bilateral PE with blood cultures positive for staph  Bacteremia? As per primary team  Needs to abstain from cocaine and alcohol  DVT Prophylaxis if no contraindication    Past medical history:    has a past medical history of Acute pancreatitis, Alcohol abuse, Depression, H/O angiography, H/O cardiovascular stress test, H/O echocardiogram, History of complete ECG, Hx of blood clots, Hypertension, and Non compliance with medical treatment. Past surgical history:   has a past surgical history that includes skin biopsy and Cardiac catheterization (06/15/2021). Social History:   reports that he has been smoking cigarettes. He has been smoking about 0.50 packs per day. He has never used smokeless tobacco. He reports current alcohol use. He reports that he does not use drugs. Family history:  family history includes Diabetes in his father; Kidney Disease in his father.     No Known Allergies    Review of Systems:    All 14 systems were reviewed and are negative  Except for the positive findings  which as documented     BP (!) 131/93   Pulse 115   Temp 98.8 °F (37.1 °C) (Oral)   Resp 20   Ht 5' 10\" (1.778 m)   Wt 159 lb 11.2 oz (72.4 kg)   SpO2 90%   BMI 22.91 kg/m²     No intake or output data in the 24 hours ending 09/16/21 1757  Physical Exam:  Constitutional:  Well developed, Well nourished, No acute distress, Non-toxic appearance. HENT:  Normocephalic, Atraumatic, Bilateral external ears normal, Oropharynx moist, No oral exudates, Nose normal. Neck- Normal range of motion, No tenderness, Supple, No stridor. Eyes:  PERRL, EOMI, Conjunctiva normal, No discharge. Respiratory:  Normal breath sounds, No respiratory distress, No wheezing, No chest tenderness. Cardiovascular:  Normal heart rate, Normal rhythm, No murmurs, No rubs, No gallops, JVP not elevated  Abdomen/GI:  Bowel sounds normal, Soft, No tenderness, No masses, No pulsatile masses. Musculoskeletal:  Intact distal pulses, No edema, No tenderness, No cyanosis, No clubbing. Good range of motion in all major joints. No tenderness to palpation or major deformities noted. Back- No tenderness. Integument:  Warm, Dry, No erythema, No rash. Lymphatic:  No lymphadenopathy noted. Neurologic:  Alert & oriented x 3, Normal motor function, Normal sensory function, No focal deficits noted.    Psychiatric:  Affect  and  Mood :no change    Medications:    albuterol sulfate HFA  2 puff Inhalation 4x daily    ipratropium  2 puff Inhalation 4x daily    metoprolol tartrate  12.5 mg Oral BID    dilTIAZem  180 mg Oral Daily    thiamine  100 mg Oral Daily    folic acid  1 mg Oral Daily    rivaroxaban  15 mg Oral BID WC    sodium chloride flush  5-40 mL IntraVENous 2 times per day    nicotine  1 patch TransDERmal Daily    lactobacillus  1 capsule Oral Daily    pantoprazole  40 mg IntraVENous Daily      sodium chloride 25 mL (09/16/21 1158)    sodium chloride 25 mL (09/15/21 2131)     HYDROmorphone, HYDROcodone-acetaminophen, ipratropium-albuterol, sodium chloride, ondansetron **OR** ondansetron, LORazepam, sodium chloride flush, sodium chloride, polyethylene glycol, acetaminophen **OR** acetaminophen, ondansetron    Lab Data:  CBC:   Recent Labs     09/15/21  1130 09/15/21  1411 09/16/21  0633   WBC 15.5* 14.2* 13.4*   HGB 13.4* 14.7 13.0*   HCT 42.8 48.0 41.4*   MCV 92.4 93.6 92.2    182 197     BMP:   Recent Labs     09/14/21  0121 09/15/21  0538 09/16/21  0633   * 134* 130*   K 4.7 4.4 4.0   CL 99 100 99   CO2 19* 24 20*   BUN 14 14 8   CREATININE 0.9 1.0 0.6*     PT/INR:   Recent Labs     09/13/21  2113   PROTIME 14.3   INR 1.12     BNP:    No results for input(s): PROBNP in the last 72 hours. TROPONIN:   Recent Labs     09/14/21  0121 09/14/21  0610   TROPONINT 0.036* 0.038*        ECHO :   echocardiogram    Assessment:  53 y. o.year old who is admitted for  Chief Complaint   Patient presents with    Abdominal Pain    URI    , active issues as noted below:  Impression:  Active Problems:    Acute pancreatitis    Non compliance with medical treatment    Deep vein thrombosis (DVT) of left lower extremity (HCC)    PAD (peripheral artery disease) ([facility] [address]    Acute deep vein thrombosis (DVT) of proximal vein of left lower extremity (HCC)    Leg swelling    Acute saddle pulmonary embolism (HCC)    Elevated troponin  Resolved Problems:    * No resolved hospital problems. *            All labs, medications and tests reviewed by myself , continue all other medications of all above medical condition listed as is except for changes mentioned above. Thank you very much for consult , please call with questions.     Ana Luisa Serrano MD, MD 9/16/2021 5:57 PM

## 2021-09-17 VITALS
RESPIRATION RATE: 22 BRPM | OXYGEN SATURATION: 90 % | WEIGHT: 157.1 LBS | DIASTOLIC BLOOD PRESSURE: 93 MMHG | BODY MASS INDEX: 22.49 KG/M2 | HEIGHT: 70 IN | HEART RATE: 112 BPM | TEMPERATURE: 98.7 F | SYSTOLIC BLOOD PRESSURE: 141 MMHG

## 2021-09-17 LAB
ANION GAP SERPL CALCULATED.3IONS-SCNC: 12 MMOL/L (ref 4–16)
BUN BLDV-MCNC: 9 MG/DL (ref 6–23)
CALCIUM SERPL-MCNC: 8 MG/DL (ref 8.3–10.6)
CHLORIDE BLD-SCNC: 103 MMOL/L (ref 99–110)
CO2: 24 MMOL/L (ref 21–32)
CREAT SERPL-MCNC: 0.8 MG/DL (ref 0.9–1.3)
CULTURE: ABNORMAL
CULTURE: NORMAL
GFR AFRICAN AMERICAN: >60 ML/MIN/1.73M2
GFR NON-AFRICAN AMERICAN: >60 ML/MIN/1.73M2
GLUCOSE BLD-MCNC: 94 MG/DL (ref 70–99)
HCT VFR BLD CALC: 39.6 % (ref 42–52)
HEMOGLOBIN: 12.4 GM/DL (ref 13.5–18)
Lab: ABNORMAL
Lab: NORMAL
MCH RBC QN AUTO: 28.3 PG (ref 27–31)
MCHC RBC AUTO-ENTMCNC: 31.3 % (ref 32–36)
MCV RBC AUTO: 90.4 FL (ref 78–100)
PDW BLD-RTO: 14.6 % (ref 11.7–14.9)
PLATELET # BLD: 245 K/CU MM (ref 140–440)
PMV BLD AUTO: 11.1 FL (ref 7.5–11.1)
POTASSIUM SERPL-SCNC: 3.8 MMOL/L (ref 3.5–5.1)
RBC # BLD: 4.38 M/CU MM (ref 4.6–6.2)
SODIUM BLD-SCNC: 139 MMOL/L (ref 135–145)
SPECIMEN: ABNORMAL
SPECIMEN: NORMAL
WBC # BLD: 9.4 K/CU MM (ref 4–10.5)

## 2021-09-17 PROCEDURE — 85027 COMPLETE CBC AUTOMATED: CPT

## 2021-09-17 PROCEDURE — 99233 SBSQ HOSP IP/OBS HIGH 50: CPT | Performed by: INTERNAL MEDICINE

## 2021-09-17 PROCEDURE — 6370000000 HC RX 637 (ALT 250 FOR IP): Performed by: INTERNAL MEDICINE

## 2021-09-17 PROCEDURE — 99232 SBSQ HOSP IP/OBS MODERATE 35: CPT | Performed by: INTERNAL MEDICINE

## 2021-09-17 PROCEDURE — 94618 PULMONARY STRESS TESTING: CPT

## 2021-09-17 PROCEDURE — C9113 INJ PANTOPRAZOLE SODIUM, VIA: HCPCS | Performed by: INTERNAL MEDICINE

## 2021-09-17 PROCEDURE — 80048 BASIC METABOLIC PNL TOTAL CA: CPT

## 2021-09-17 PROCEDURE — 94761 N-INVAS EAR/PLS OXIMETRY MLT: CPT

## 2021-09-17 PROCEDURE — 6360000002 HC RX W HCPCS: Performed by: INTERNAL MEDICINE

## 2021-09-17 PROCEDURE — 36415 COLL VENOUS BLD VENIPUNCTURE: CPT

## 2021-09-17 PROCEDURE — 2580000003 HC RX 258: Performed by: INTERNAL MEDICINE

## 2021-09-17 PROCEDURE — 2700000000 HC OXYGEN THERAPY PER DAY

## 2021-09-17 RX ORDER — DILTIAZEM HYDROCHLORIDE 240 MG/1
240 CAPSULE, COATED, EXTENDED RELEASE ORAL DAILY
Status: DISCONTINUED | OUTPATIENT
Start: 2021-09-18 | End: 2021-09-17 | Stop reason: HOSPADM

## 2021-09-17 RX ORDER — DILTIAZEM HYDROCHLORIDE 240 MG/1
240 CAPSULE, COATED, EXTENDED RELEASE ORAL DAILY
Qty: 30 CAPSULE | Refills: 3 | Status: SHIPPED | OUTPATIENT
Start: 2021-09-18

## 2021-09-17 RX ORDER — ALBUTEROL SULFATE 90 UG/1
2 AEROSOL, METERED RESPIRATORY (INHALATION) EVERY 4 HOURS PRN
Status: DISCONTINUED | OUTPATIENT
Start: 2021-09-17 | End: 2021-09-17 | Stop reason: HOSPADM

## 2021-09-17 RX ADMIN — RIVAROXABAN 15 MG: 15 TABLET, FILM COATED ORAL at 10:26

## 2021-09-17 RX ADMIN — HYDROCODONE BITARTRATE AND ACETAMINOPHEN 1 TABLET: 5; 325 TABLET ORAL at 08:08

## 2021-09-17 RX ADMIN — FOLIC ACID 1 MG: 1 TABLET ORAL at 10:26

## 2021-09-17 RX ADMIN — HYDROMORPHONE HYDROCHLORIDE 0.5 MG: 1 INJECTION, SOLUTION INTRAMUSCULAR; INTRAVENOUS; SUBCUTANEOUS at 05:50

## 2021-09-17 RX ADMIN — Medication 1 CAPSULE: at 10:26

## 2021-09-17 RX ADMIN — HYDROCODONE BITARTRATE AND ACETAMINOPHEN 1 TABLET: 5; 325 TABLET ORAL at 00:08

## 2021-09-17 RX ADMIN — PANTOPRAZOLE SODIUM 40 MG: 40 INJECTION, POWDER, FOR SOLUTION INTRAVENOUS at 10:25

## 2021-09-17 RX ADMIN — HYDROMORPHONE HYDROCHLORIDE 0.5 MG: 1 INJECTION, SOLUTION INTRAMUSCULAR; INTRAVENOUS; SUBCUTANEOUS at 10:25

## 2021-09-17 RX ADMIN — SODIUM CHLORIDE, PRESERVATIVE FREE 10 ML: 5 INJECTION INTRAVENOUS at 10:25

## 2021-09-17 RX ADMIN — Medication 100 MG: at 10:26

## 2021-09-17 RX ADMIN — METOPROLOL TARTRATE 12.5 MG: 25 TABLET, FILM COATED ORAL at 10:26

## 2021-09-17 ASSESSMENT — PAIN DESCRIPTION - PAIN TYPE
TYPE: ACUTE PAIN

## 2021-09-17 ASSESSMENT — PAIN - FUNCTIONAL ASSESSMENT
PAIN_FUNCTIONAL_ASSESSMENT: ACTIVITIES ARE NOT PREVENTED
PAIN_FUNCTIONAL_ASSESSMENT: ACTIVITIES ARE NOT PREVENTED

## 2021-09-17 ASSESSMENT — PAIN DESCRIPTION - ONSET
ONSET: ON-GOING
ONSET: ON-GOING

## 2021-09-17 ASSESSMENT — PAIN DESCRIPTION - DESCRIPTORS
DESCRIPTORS: ACHING
DESCRIPTORS: ACHING
DESCRIPTORS: SHARP

## 2021-09-17 ASSESSMENT — PAIN SCALES - GENERAL
PAINLEVEL_OUTOF10: 3
PAINLEVEL_OUTOF10: 5
PAINLEVEL_OUTOF10: 6

## 2021-09-17 ASSESSMENT — PAIN DESCRIPTION - ORIENTATION
ORIENTATION: LEFT
ORIENTATION: MID
ORIENTATION: MID

## 2021-09-17 ASSESSMENT — PAIN DESCRIPTION - LOCATION
LOCATION: ABDOMEN
LOCATION: ABDOMEN
LOCATION: FLANK

## 2021-09-17 ASSESSMENT — PAIN DESCRIPTION - FREQUENCY
FREQUENCY: CONTINUOUS
FREQUENCY: CONTINUOUS

## 2021-09-17 ASSESSMENT — PAIN DESCRIPTION - PROGRESSION
CLINICAL_PROGRESSION: NOT CHANGED
CLINICAL_PROGRESSION: NOT CHANGED

## 2021-09-17 NOTE — DISCHARGE SUMMARY
[facility]    Patient Demographics    Patient. Ratna Ace  Date of Birth. 1970  MRN. [id number]     Primary care provider. TITUS Tinajero CNP  (Tel: [phone])    Admit date: 9/13/2021    Discharge date (blank if same as Note Date): Note Date: 9/17/2021     Reason for Hospitalization. Chief Complaint   Patient presents with    Abdominal Pain    URI         Significant Findings. Active Problems:    Acute pancreatitis    Non compliance with medical treatment    Deep vein thrombosis (DVT) of left lower extremity (HCC)    PAD (peripheral artery disease) (HCC)    Acute deep vein thrombosis (DVT) of proximal vein of left lower extremity (HCC)    Leg swelling    Acute saddle pulmonary embolism (HCC)    Elevated troponin  Resolved Problems:    * No resolved hospital problems. [place] [facility] Course:    the patient is a 79-year-old male with history of peripheral vascular disease, history of PE/DVT noncompliant with medications, pancreatitis, alcohol dependence, tobacco dependence, lung mass status post resection, extensive left lower extremity DVT status post venous angioplasty in April 2021 was discharged on Xarelto at the time. Presented to the ER with shortness of breath. CT PE consistent with acute saddle embolus in the upper and lower lungs bilaterally with right-sided heart strain. CT abdomen showed nonspecific small bowel enteritis. DVT extending from the left iliac vein into the inferior vena cava  Cardiology consulted, patient was started on heparin infusion  Underwent EKOS status post TPA infusion. Xarelto restarted     Patient refused IVC filter, Cardizem dose increased to improve heart rate blood cultures positive for Staphylococcus 0.9/15, vancomycin added    Turned out to be a contaminant, antibiotics stopped  Was down to 3 L  Discharged home on Xarelto 15 mg p.o. twice daily for 21 days in total and then 20 mg daily  Lisinopril stopped, metoprolol and diltiazem continued  Medication adherence reinforced  Patient was threatening to leave AMA if not discharged today so I discharged him  Invasive procedures and treatments. 1. EKOS    Consults. IP CONSULT TO CARDIOLOGY  IP CONSULT TO HOSPITALIST  IP CONSULT TO GENERAL SURGERY  IP CONSULT TO INTERVENTIONAL RADIOLOGY  IP CONSULT TO HEM/ONC    Physical examination on discharge day. BP (!) 115/93   Pulse 93   Temp 98.9 °F (37.2 °C) (Oral)   Resp 18   Ht 5' 10\" (1.778 m)   Wt 157 lb 1.6 oz (71.3 kg)   SpO2 92%   BMI 22.54 kg/m²   General appearance. Alert. Looks comfortable. HEENT. Sclera clear. Moist mucus membranes. Cardiovascular. Regular rate and rhythm, normal S1, S2. No murmur. Respiratory. Not using accessory muscles. Clear to auscultation bilaterally, no wheeze. Gastrointestinal. Abdomen soft, non-tender, not distended, normal bowel sounds  Neurology. Facial symmetry. No speech deficits. Moving all extremities equally. Extremities. No edema in lower extremities. Skin. Warm, dry, normal turgor    Condition at time of discharge stable    Medication instructions provided to patient at discharge. Medication List      START taking these medications    dilTIAZem 240 MG extended release capsule  Commonly known as: CARDIZEM CD  Take 1 capsule by mouth daily  Start taking on: September 18, 2021     metoprolol tartrate 25 MG tablet  Commonly known as: LOPRESSOR  Take 0.5 tablets by mouth 2 times daily        CHANGE how you take these medications    * rivaroxaban 15 MG Tabs tablet  Commonly known as: XARELTO  Take 1 tablet by mouth 2 times daily (with meals) for 19 days  What changed: You were already taking a medication with the same name, and this prescription was added. Make sure you understand how and when to take each.      * rivaroxaban 20 MG Tabs tablet  Commonly known as: Xarelto  Take 1 tablet by mouth

## 2021-09-17 NOTE — PROGRESS NOTES
Pt has [address] which our DME does not accept. Home O2 was completed very quickly, but pt has informed nurse he is signing out AMA and will not wait for oxygen to be delivered which would have been here hopefully within the hour. Pt needs oxygen, but is unwilling to stay to get needed treatment.

## 2021-09-17 NOTE — PROGRESS NOTES
Patient was seen in hospital for Emphysema, Pulmonary Embolism .  I am prescribing oxygen because the diagnosis and testing requires the patient to have oxygen in the home.  Conditions will improve or be benefited by oxygen use.  The patient is able to perform good mobility and therefore requires the use of a portable oxygen system for ambulation.

## 2021-09-17 NOTE — PROGRESS NOTES
Pleaded with patient to stay to receive home oxygen tank and again tried to educate patient about the importance of utilizing oxygen at home; patient states he has had he same issues before and never needed oxygen; also expresses that he feels that our monitor is not reading his saturation correctly. Explained to patient that we will have his oxygen tank delivered to bedside within the hour and patient states \"I will not use it anyways\". Patient signed AMA paperwork and exited the facility with obvious shortness of breath. Physician notified. Peripheral IV removed.

## 2021-09-17 NOTE — PLAN OF CARE
Problem: Pain:  Goal: Pain level will decrease  Description: Pain level will decrease  Outcome: Ongoing  Goal: Control of acute pain  Description: Control of acute pain  Outcome: Ongoing  Goal: Control of chronic pain  Description: Control of chronic pain  Outcome: Ongoing
Problem: Pain:  Goal: Pain level will decrease  Description: Pain level will decrease  Outcome: Ongoing  Goal: Control of acute pain  Description: Control of acute pain  Outcome: Ongoing  Goal: Control of chronic pain  Description: Control of chronic pain  Outcome: Ongoing     Problem: Falls - Risk of:  Goal: Will remain free from falls  Description: Will remain free from falls  Outcome: Ongoing  Goal: Absence of physical injury  Description: Absence of physical injury  Outcome: Ongoing
symptoms  Outcome: Ongoing  Goal: Ability to remain free from injury will improve  Description: Ability to remain free from injury will improve  Outcome: Ongoing     Problem: Mood - Altered:  Goal: Mood stable  Description: Mood stable  Outcome: Ongoing     Problem: Violence - Risk of, Self/Other-Directed:  Goal: Knowledge of developmental care interventions  Description: Absence of violence  Outcome: Ongoing

## 2021-09-17 NOTE — PROGRESS NOTES
Patient self removed nasal cannula oxygen and found to have oxygen saturations of 83-86% on room air; encouraged patient to wear oxygen and he states he will apply oxygen when he is done eating. Patient is notably short of breath while eating. Patient qualifies for home oxygen. Educated patient on importance of being compliant with oxygen therapy while he is in hospital and when he is discharged later today; patient states he only needs oxygen because he is not being active like usual and he will be fine when he leaves. Patient inquiring about discharge, explained to patient that we are working on setting up home oxygen. Will continue to assess and monitor. Patient states if he is not discharged by 2pm, he will leave AMA.

## 2021-09-17 NOTE — PROGRESS NOTES
ONCOLOGY HEMATOLOGY CARE ([facility])  PROGRESS NOTE      Patient was seen and examined today. He was grullon this morning due to the pain. I recommend he asked the nurse for his pain medication. We have a lengthy discussion with regard to risks and benefits of IVC filter. Even with the IVC filter he still has to take his anticoagulation. The filter itself may become the source of blood clot. Since there is concern about his adherence to anticoagulation, he may benefit from IVC filter. He stated he will think about the IVC filter. PHYSICAL EXAM    Vitals: BP (!) 132/94   Pulse 94   Temp 98.7 °F (37.1 °C) (Oral)   Resp 20   Ht 5' 10\" (1.778 m)   Wt 157 lb 1.6 oz (71.3 kg)   SpO2 94%   BMI 22.54 kg/m²   CONSTITUTIONAL: awake, alert, cooperative, no apparent distress   EYES: pupils equal, round and reactive to light, sclera clear and conjunctiva normal  ENT: Normocephalic, without obvious abnormality, atraumatic  NECK: supple, symmetrical, no jugular venous distension and no carotid bruits   HEMATOLOGIC/LYMPHATIC: no cervical, supraclavicular or axillary lymphadenopathy   LUNGS: no increased work of breathing and clear to auscultation   CARDIOVASCULAR: regular rate and rhythm, normal S1 and S2, no murmur   ABDOMEN: normal bowel sound, soft, non-distended, non-tender, no masses palpated, no hepatosplenomgaly   MUSCULOSKELETAL: full range of motion noted, tone is normal  NEUROLOGIC: awake, alert, oriented to name, place and time. Motor skills grossly intact. SKIN: Normal skin color, texture, turgor and no jaundice.  appears intact   EXTREMITIES: no LE edema     LABORATORY RESULTS  CBC: Recent Labs     09/15/21  1411 09/16/21  0633 09/17/21  0624   WBC 14.2* 13.4* 9.4   HGB 14.7 13.0* 12.4*    197 245     BMP:    Recent Labs     09/15/21  0538 09/16/21  0633 09/17/21  0624   * 130* 139   K 4.4 4.0 3.8    99 103   CO2 24 20* 24   BUN 14 8 9   CREATININE 1.0 0.6* 0.8*   GLUCOSE 97 99 94

## 2021-09-17 NOTE — PROGRESS NOTES
Cardiology Progress Note     Admit Date:  9/13/2021    Consult reason/ Seen today for :   PE     Subjective and  Overnight Events : He feels better  HR improved  I had d/w oncology , we debated IVC filter but patient is refusing it at this time      Chief complain on admission : 53 y. o.year old who is admitted for  Chief Complaint   Patient presents with    Abdominal Pain    URI      Assessment / Plan:  Acute PE with saddle embolism , s/p TPA infusion with EKOS catheter removed by myself this morning. Xarelto as per protocol  Bilateral femoral vein thrombus with significant PE burden s/p TPA he has had left femoral vein angioplasty several times noncompliant with anticoagulation hypercoagulable status  Debate IVC filter? High risk for more PEs discussed with IR and oncology  Extensively, we offered it to patient but he refused it , vieyra snot want it  Bilateral PE with blood cultures positive for staph  Bacteremia? As per primary team  Increase cardizem for better hr  Needs to abstain from cocaine and alcohol  DVT Prophylaxis if no contraindication  Follow up in office  Call with questions will signoff     Past medical history:    has a past medical history of Acute pancreatitis, Alcohol abuse, Depression, H/O angiography, H/O cardiovascular stress test, H/O echocardiogram, History of complete ECG, Hx of blood clots, Hypertension, and Non compliance with medical treatment. Past surgical history:   has a past surgical history that includes skin biopsy and Cardiac catheterization (06/15/2021). Social History:   reports that he has been smoking cigarettes. He has been smoking about 0.50 packs per day. He has never used smokeless tobacco. He reports current alcohol use. He reports that he does not use drugs. Family history:  family history includes Diabetes in his father; Kidney Disease in his father.     No Known Allergies    Review of Systems:    All 14 systems were reviewed and are negative  Except for the positive findings  which as documented     BP (!) 132/94   Pulse 94   Temp 98.7 °F (37.1 °C) (Oral)   Resp 20   Ht 5' 10\" (1.778 m)   Wt 157 lb 1.6 oz (71.3 kg)   SpO2 94%   BMI 22.54 kg/m²       Intake/Output Summary (Last 24 hours) at 9/17/2021 0858  Last data filed at 9/17/2021 4124  Gross per 24 hour   Intake 480 ml   Output --   Net 480 ml     Physical Exam:  Constitutional:  Well developed, Well nourished, No acute distress, Non-toxic appearance. HENT:  Normocephalic, Atraumatic, Bilateral external ears normal, Oropharynx moist, No oral exudates, Nose normal. Neck- Normal range of motion, No tenderness, Supple, No stridor. Eyes:  PERRL, EOMI, Conjunctiva normal, No discharge. Respiratory:  Normal breath sounds, No respiratory distress, No wheezing, No chest tenderness. Cardiovascular:  Normal heart rate, Normal rhythm, No murmurs, No rubs, No gallops, JVP not elevated  Abdomen/GI:  Bowel sounds normal, Soft, No tenderness, No masses, No pulsatile masses. Musculoskeletal:  Intact distal pulses, No edema, No tenderness, No cyanosis, No clubbing. Good range of motion in all major joints. No tenderness to palpation or major deformities noted. Back- No tenderness. Integument:  Warm, Dry, No erythema, No rash. Lymphatic:  No lymphadenopathy noted. Neurologic:  Alert & oriented x 3, Normal motor function, Normal sensory function, No focal deficits noted.    Psychiatric:  Affect  and  Mood :no change    Medications:    metoprolol tartrate  12.5 mg Oral BID    dilTIAZem  180 mg Oral Daily    thiamine  100 mg Oral Daily    folic acid  1 mg Oral Daily    rivaroxaban  15 mg Oral BID WC    sodium chloride flush  5-40 mL IntraVENous 2 times per day    nicotine  1 patch TransDERmal Daily    lactobacillus  1 capsule Oral Daily    pantoprazole  40 mg IntraVENous Daily      sodium chloride 25 mL (09/16/21 1158)    sodium chloride 25 mL (09/15/21 2131)     ipratropium, albuterol sulfate HFA, HYDROmorphone, HYDROcodone-acetaminophen, sodium chloride, ondansetron **OR** ondansetron, LORazepam, sodium chloride flush, sodium chloride, polyethylene glycol, acetaminophen **OR** acetaminophen, ondansetron    Lab Data:  CBC:   Recent Labs     09/15/21  1411 09/16/21  0633 09/17/21  0624   WBC 14.2* 13.4* 9.4   HGB 14.7 13.0* 12.4*   HCT 48.0 41.4* 39.6*   MCV 93.6 92.2 90.4    197 245     BMP:   Recent Labs     09/15/21  0538 09/16/21  0633 09/17/21  0624   * 130* 139   K 4.4 4.0 3.8    99 103   CO2 24 20* 24   BUN 14 8 9   CREATININE 1.0 0.6* 0.8*     PT/INR:   No results for input(s): PROTIME, INR in the last 72 hours. BNP:    No results for input(s): PROBNP in the last 72 hours. TROPONIN:   No results for input(s): TROPONINT in the last 72 hours. ECHO :   echocardiogram    Assessment:  53 y. o.year old who is admitted for  Chief Complaint   Patient presents with    Abdominal Pain    URI    , active issues as noted below:  Impression:  Active Problems:    Acute pancreatitis    Non compliance with medical treatment    Deep vein thrombosis (DVT) of left lower extremity (HCC)    PAD (peripheral artery disease) ([facility] [address]    Acute deep vein thrombosis (DVT) of proximal vein of left lower extremity (HCC)    Leg swelling    Acute saddle pulmonary embolism (HCC)    Elevated troponin  Resolved Problems:    * No resolved hospital problems. *            All labs, medications and tests reviewed by myself , continue all other medications of all above medical condition listed as is except for changes mentioned above. Thank you very much for consult , please call with questions.     Cyndy Crouch MD, MD 9/17/2021 8:58 AM

## 2021-09-17 NOTE — PROGRESS NOTES
9/17/2021 11:50 AM  Patient Room #: 5829/7532-E  Patient Name: Jazmin Sauer    (Step 1 Done by RN if possible otherwise call Pulmonary Diagnostics)  1. Place patient on room air at rest for at least 30 minutes. If patient falls below 88% before 30 minutes then you can record the level and stop. Record room air saturation level _86_ %. If patient is at 88% or below, they will qualify for home oxygen and you can stop. If level does not fall below 88%, fill in level above. If indicated continue to Step 2. Signature:__Audra Aguilera Daily, RRT__ Date: _09/17/2021__  (Step 2&3 Done by P)  2. Ambulate patient on room air until saturation falls below 89%. Record level of room air saturation with ambulation___ %. Next, place patient back on ___lpm oxygen and ambulate, record level __%. (Note:  this level must show improvement from room air level done with ambulation.)  If patients saturation on room air with ambulation is 88% or below AND patient shows improvement with oxygen during ambulation, they will qualify for home oxygen and you can stop. If patient does not drop below 89%, then patient should have an overnight oximetry trending on room air to see if level falls below 88%. Complete level in Step 3 below. 3. Room air overnight oximetry level 88 % for___  cumulative minutes. If patients room air oxygen level is < 89% for at least 5 cumulative minutes, patient will qualify for home oxygen and you can stop. (Attach Night Trending Report)    Complete order below: Diagnosis:_Emphysema, Pulmonary Embolism___  Home oxygen at:  Length of Need: X Lifetime ?  3 Months     _3__lpm or __%   via  [x] nasal cannula  []mask  [] other:___         [x]continuous [x]  with activity  [x]  Nocturnal   [x] Portable Tanks []  Concentrator        Therapist Signature:__Audra Aguilera Daily, RRT___     Date:  _09/17/2021__  Physician Signature:  __Electronically Signed in EMR_    Date:___  Physician Printed Name: Ordering User: Becca Mujica MD  NPI:  [id number]  [x] Patient Qualifies      [] Patient Does NOT qualify

## 2021-09-19 LAB
CULTURE: NORMAL
Lab: NORMAL
PROSTATE SPECIFIC ANTIGEN FREE: <0.1 NG/ML
PROSTATE SPECIFIC ANTIGEN PERCENT FREE: <25 %
PROSTATE SPECIFIC ANTIGEN: 0.4 NG/ML (ref 0–4)
SPECIMEN: NORMAL

## 2021-09-20 LAB
CULTURE: NORMAL
CULTURE: NORMAL
Lab: NORMAL
Lab: NORMAL
SPECIMEN: NORMAL
SPECIMEN: NORMAL

## 2021-10-05 ENCOUNTER — OFFICE VISIT (OUTPATIENT)
Dept: CARDIOLOGY CLINIC | Age: 51
End: 2021-10-05
Payer: MEDICAID

## 2021-10-05 VITALS
BODY MASS INDEX: 20.67 KG/M2 | DIASTOLIC BLOOD PRESSURE: 88 MMHG | WEIGHT: 144.4 LBS | HEIGHT: 70 IN | HEART RATE: 96 BPM | SYSTOLIC BLOOD PRESSURE: 126 MMHG

## 2021-10-05 DIAGNOSIS — I73.9 PAD (PERIPHERAL ARTERY DISEASE) (HCC): Primary | ICD-10-CM

## 2021-10-05 DIAGNOSIS — I26.92 SADDLE EMBOLUS OF PULMONARY ARTERY, UNSPECIFIED CHRONICITY, UNSPECIFIED WHETHER ACUTE COR PULMONALE PRESENT (HCC): ICD-10-CM

## 2021-10-05 DIAGNOSIS — I10 HYPERTENSION, UNSPECIFIED TYPE: ICD-10-CM

## 2021-10-05 PROCEDURE — G8427 DOCREV CUR MEDS BY ELIG CLIN: HCPCS | Performed by: NURSE PRACTITIONER

## 2021-10-05 PROCEDURE — 99214 OFFICE O/P EST MOD 30 MIN: CPT | Performed by: NURSE PRACTITIONER

## 2021-10-05 PROCEDURE — G8484 FLU IMMUNIZE NO ADMIN: HCPCS | Performed by: NURSE PRACTITIONER

## 2021-10-05 PROCEDURE — G8420 CALC BMI NORM PARAMETERS: HCPCS | Performed by: NURSE PRACTITIONER

## 2021-10-05 PROCEDURE — 4004F PT TOBACCO SCREEN RCVD TLK: CPT | Performed by: NURSE PRACTITIONER

## 2021-10-05 PROCEDURE — 1111F DSCHRG MED/CURRENT MED MERGE: CPT | Performed by: NURSE PRACTITIONER

## 2021-10-05 ASSESSMENT — ENCOUNTER SYMPTOMS
ORTHOPNEA: 0
SHORTNESS OF BREATH: 1

## 2021-10-05 NOTE — PATIENT INSTRUCTIONS
**It is YOUR responsibilty to bring medication bottles and/or updated medication list to [address] [place] [address]. This will allow us to better serve you and all your healthcare needs**  Please be informed that if you contact our office outside of normal business hours the physician on call cannot help with any scheduling or rescheduling issues, procedure instruction questions or any type of medication issue. We advise you for any urgent/emergency that you go to the nearest emergency room!     PLEASE CALL OUR OFFICE DURING NORMAL BUSINESS HOURS    Monday - Friday   8 am to 5 pm    [place]: [phone]    [place] [place]: [phone]    [place]:  [phone]      Resume metoprolol 12.5 mg bid

## 2021-10-05 NOTE — LETTER
these labs for these providers!     Do you have a surgery or procedure scheduled in the near future - No       Ask patient if they want to sign up for MyChart if they are not already signed up     Check to see if we have an E-MAIL on file for the patient     Check medication list thoroughly!!! AND RECONCILE OUTSIDE MEDICATIONS  If dose has changed change the entire order not just the MG  BE SURE TO ASK PATIENT IF THEY NEED MEDICATION REFILLS     At check out add to every patient's \"wrap up\" the following dot phrase AFTERHOURSEDUCATION and ensure we explain this to our patients

## 2021-10-05 NOTE — PROGRESS NOTES
10/5/2021  Primary cardiologist: Dr. Gabriela Dorado  is an established 53 y.o.  male here for follow-up on recent hospitalization for PE DVT- PE- PAD-HTN       SUBJECTIVE/OBJECTIVE:  HPI :    Adonis Ly reports he was recently in [facility] for acute PE- he did receive TPA per EKOS . He was also noted tho have bilateral femoral vein thrombus. He has femoral PTA several times in the past however he has been non compliant with taking anticoagulation. He has declined IV filter. Of note time of this past admission he was positive for cocaine and amphetamines. He was to go home with supplemental oxygen however he declined and walked out AMA. He was not given discharge instructions nor given prescriptions     He was evaluated by hematology while in hospital.  He previously had a work-up for hypercoagulability study in April 2021 which was unremarkable. He is angry today as he feels he is not getting good care. States he is still continues to have shortness of breath with activity, chest pain and leg pain he states walking short distances he will become short of breath. Looking from the parking lot into the office positive shortness of breath. He notes dull chest ache that last for few minutes. The pain is been ongoing for weeks goes away on its own. There are no aggravating factors. He notes pain in his legs with walking long distances. Review of Systems   Constitutional: Negative for diaphoresis and malaise/fatigue. Cardiovascular: Positive for chest pain, claudication and dyspnea on exertion. Negative for irregular heartbeat, leg swelling, near-syncope, orthopnea, palpitations and paroxysmal nocturnal dyspnea. Respiratory: Positive for shortness of breath. Neurological: Negative for dizziness and light-headedness. Vitals:    10/05/21 1335   BP: 126/88   Pulse: 96   Weight: 144 lb 6.4 oz (65.5 kg)   Height: 5' 10\" (1.778 m)     No flowsheet data found.   Wt Readings from Last 3 Encounters: 10/05/21 144 lb 6.4 oz (65.5 kg)   09/17/21 157 lb 1.6 oz (71.3 kg)   06/21/21 160 lb 9.6 oz (72.8 kg)     Body mass index is 20.72 kg/m². Physical Exam  Constitutional:       General: He is not in acute distress. Appearance: Normal appearance. Cardiovascular:      Rate and Rhythm: Tachycardia present. Pulses: Normal pulses. Heart sounds: Normal heart sounds. No murmur heard. Pulmonary:      Effort: No tachypnea. Breath sounds: Normal breath sounds. Neurological:      Mental Status: He is alert and oriented to person, place, and time. Psychiatric:         Mood and Affect: Mood is anxious and depressed. All pertinent data reviewed and discussed with patient including:           ASSESSMENT/PLAN:    Saddle pulmonary emboli  Recently admitted with saddle pulmonary embolus extending into the upper and lower lobe pulmonary arteries bilaterally-cardiogram demonstrated severely dilated RV with S wave in [place] sign suggestive of right heart strain   Was given TPA via EKOS  Unfortunately continue to be short of breath during his hospitalization with oxygen saturation 82 to 83% on room air and was recommended to go home on home oxygen however he declined and walked out AMA. He also left without prescriptions or discharge summary-he was to be discharged on Xarelto 50 mg x 21 days and to resume 20 mg once daily. He did state today that he did resume the Xarelto. Recommend a recheck echocardiogram in 2 weeks to evaluate for right heart strain as he continues to have some shortness of breath. Stressed compliance with medication as his last episode may have been secondary to noncompliance as he did stop taking his Xarelto. We discussed again the possibility of use of an IVC filter if he was unable to be compliant with medications at this time continues to decline.   Recommend to follow-up with oncology for coagulability studies       DVT  Venous Doppler reviewed from September 2021 showing occlusive thrombus involving bilateral distal femoral veins and extending into the bilateral popliteal veins-received TPA in hospital via EKOS for recent pulmonary emboli as well. Recommend to continue with anticoagulation lifelong as he says multiple DVT/PE and venous angioplasties      PAD  Noted to have severe PAD to the right leg.    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  Flow reconstitutes in the   proximal to mid right popliteal artery. 2. Heterogeneous, occlusive filling defects in the mid and distal right   popliteal artery with occlusion of the tibioperoneal trunk, suspicious for   acute or subacute thrombus. 3. A large posterior calf collateral reconstitutes flow in the mid right   peroneal and posterior tibial arteries, which remain patent to the foot. 4. No significant stenosis or occlusion in the arteries of the left lower   extremity. 5. Moderate to severe emphysema, greater than expected for age. 6. 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 He was scheduled for peripheral angiogram however patient did not show for procedure. Today he feels unsure if he wants to pursue with any further testing. He appears to be depressed-states that he does not know if he wants to do anything else -  Patient reluctant to have procedures as he states he is had them done in the past and his not been beneficial and is felt no difference after having them done as he continues to have pain.    Again we discussed compliance with medical therapies and he is reluctant for further treatment         Hypertension   Blood pressure is controlled  Resume metoprolol to assist with bp and tachycardia- will titrate accordingly     Tobacco use  Encouraged smoking cessation    Medications reviewed and confirmed with patient     Tests ordered:  echo      OV in 3 months

## 2022-02-07 ENCOUNTER — HOSPITAL ENCOUNTER (OUTPATIENT)
Age: 52
Discharge: HOME | End: 2022-02-07
Payer: MEDICAID

## 2022-02-07 LAB
ALBUMIN SERPL-MCNC: 4.4 GM/DL (ref 3.4–5)
ALP BLD-CCNC: 84 IU/L (ref 40–129)
ALT SERPL-CCNC: 16 U/L (ref 10–40)
AST SERPL-CCNC: 15 IU/L (ref 15–37)
BILIRUB SERPL-MCNC: 0.6 MG/DL (ref 0–1)
BILIRUBIN DIRECT: 0.2 MG/DL (ref 0–0.3)
BILIRUBIN, INDIRECT: 0.4 MG/DL (ref 0–0.7)
HEPATITIS B SURFACE ANTIGEN: NON REACTIVE
HEPATITIS C ANTIBODY: NON REACTIVE
TOTAL PROTEIN: 7 GM/DL (ref 6.4–8.2)

## 2022-02-07 PROCEDURE — 86708 HEPATITIS A ANTIBODY: CPT

## 2022-02-07 PROCEDURE — 87389 HIV-1 AG W/HIV-1&-2 AB AG IA: CPT

## 2022-02-07 PROCEDURE — 36415 COLL VENOUS BLD VENIPUNCTURE: CPT

## 2022-02-07 PROCEDURE — 86592 SYPHILIS TEST NON-TREP QUAL: CPT

## 2022-02-07 PROCEDURE — 80076 HEPATIC FUNCTION PANEL: CPT

## 2022-02-07 PROCEDURE — 86803 HEPATITIS C AB TEST: CPT

## 2022-02-07 PROCEDURE — 87340 HEPATITIS B SURFACE AG IA: CPT

## 2022-02-08 LAB
HAV AB SERPL IA-ACNC: NEGATIVE
HIV SCREEN: NON REACTIVE

## 2022-02-09 LAB — RPR: NON REACTIVE

## 2022-02-09 NOTE — ED NOTES
Report called to Joseph Borden RN. All questions answered.      Ansley Corrigan  04/29/21 2002 160.02

## 2022-02-26 ENCOUNTER — HOSPITAL ENCOUNTER (EMERGENCY)
Age: 52
Discharge: LEFT BEFORE DISCHARGE | End: 2022-02-26
Attending: EMERGENCY MEDICINE
Payer: MEDICAID

## 2022-02-26 VITALS
TEMPERATURE: 97.2 F | RESPIRATION RATE: 18 BRPM | HEIGHT: 70 IN | WEIGHT: 150 LBS | BODY MASS INDEX: 21.47 KG/M2 | DIASTOLIC BLOOD PRESSURE: 122 MMHG | SYSTOLIC BLOOD PRESSURE: 157 MMHG | OXYGEN SATURATION: 97 % | HEART RATE: 106 BPM

## 2022-02-26 DIAGNOSIS — M79.604 RIGHT LEG PAIN: Primary | ICD-10-CM

## 2022-02-26 DIAGNOSIS — Z72.0 TOBACCO USE: ICD-10-CM

## 2022-02-26 DIAGNOSIS — Z53.29 LEFT AGAINST MEDICAL ADVICE: ICD-10-CM

## 2022-02-26 PROCEDURE — 99284 EMERGENCY DEPT VISIT MOD MDM: CPT

## 2022-02-26 ASSESSMENT — ENCOUNTER SYMPTOMS
EYE ITCHING: 0
NAUSEA: 0
EYE DISCHARGE: 0
SORE THROAT: 0
EYE REDNESS: 0
ABDOMINAL PAIN: 0
SHORTNESS OF BREATH: 0
RHINORRHEA: 0
BACK PAIN: 0
TROUBLE SWALLOWING: 0
BLOOD IN STOOL: 0
EYE PAIN: 0
VOICE CHANGE: 0
ABDOMINAL DISTENTION: 0
FACIAL SWELLING: 0
ANAL BLEEDING: 0
DIARRHEA: 0
COUGH: 0
PHOTOPHOBIA: 0
STRIDOR: 0
SINUS PRESSURE: 0
VOMITING: 0
CONSTIPATION: 0
CHEST TIGHTNESS: 0
WHEEZING: 0

## 2022-02-26 ASSESSMENT — PAIN DESCRIPTION - LOCATION: LOCATION: LEG

## 2022-02-26 ASSESSMENT — PAIN DESCRIPTION - ORIENTATION: ORIENTATION: LEFT;RIGHT

## 2022-02-26 ASSESSMENT — PAIN DESCRIPTION - PAIN TYPE: TYPE: CHRONIC PAIN

## 2022-02-26 ASSESSMENT — PAIN SCALES - GENERAL: PAINLEVEL_OUTOF10: 10

## 2022-02-26 NOTE — ED PROVIDER NOTES
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 He is now on Xaralto. He presents to the ED with increased pain and swelling of the right lower leg and calf x1 week which is worse than usual. He is having difficulty with ambulation. He denies injury. He is requesting to \"get this finally taken care of once and for good\" today in the emergency department. BP (!) 157/122   Pulse 106   Temp 97.2 °F (36.2 °C) (Infrared)   Resp 18   Ht 5' 10\" (1.778 m)   Wt 150 lb (68 kg)   SpO2 97%   BMI 21.52 kg/m²     I have reviewed the following from the nursing documentation:      Prior to Admission medications    Medication Sig Start Date End Date Taking? Authorizing Provider   metoprolol tartrate (LOPRESSOR) 25 MG tablet Take 0.5 tablets by mouth 2 times daily 9/17/21  Yes Marquis Pablo MD   rivaroxaban (XARELTO) 20 MG TABS tablet Take 1 tablet by mouth daily (with breakfast) Once 15mg bid for 21 days is done 10/6/21  Yes Marquis Pablo MD   rivaroxaban (XARELTO) 15 MG TABS tablet Take 1 tablet by mouth 2 times daily (with meals) for 19 days 9/17/21 10/6/21  Marquis Pablo MD   dilTIAZem (CARDIZEM CD) 240 MG extended release capsule Take 1 capsule by mouth daily 9/18/21   Marquis Pablo MD       Allergies as of 02/26/2022    (No Known Allergies)       Past Medical History:   Diagnosis Date    Acute pancreatitis 10/19/2012    admitted to [facility], but left AMA    Alcohol abuse     Depression     H/O angiography 06/15/2021    conclusion:\" patent left femoral vein, moderate to severe stenosis of left common iliac vein    H/O cardiovascular stress test 6/18/2012, 12/27/2010 6/18/2012-Normal Myocardial Perfusion Study. Post stress myocardial perfusion images show a normal pattern of perfusion in all regions. Post stress LV is normal size and function. Normal perfusion in distribution of all coronaries.  EF 65%.    H/O echocardiogram 06/21/2012 6/21/2012-LV normal size. Normal LV wall thickness. LV systolic function normal. EF=>55%. LV wall motion normal. The atrial septal defect is small.  History of complete ECG 6/18/2012, 1/6/2012, 12/27/2011    Hx of blood clots     Hypertension     Non compliance with medical treatment         Surgical History:   Past Surgical History:   Procedure Laterality Date    CARDIAC CATHETERIZATION  06/15/2021    conclusion:\" patent left femoral vein, moderate to severe stenosis of left common iliac vein    SKIN BIOPSY          Family History:    Family History   Problem Relation Age of Onset    Diabetes Father     Kidney Disease Father        Social History     Socioeconomic History    Marital status:      Spouse name: Not on file    Number of children: 1    Years of education: Not on file    Highest education level: Not on file   Occupational History    Occupation: Manger/cook   Tobacco Use    Smoking status: Current Every Day Smoker     Packs/day: 0.50     Types: Cigarettes    Smokeless tobacco: Never Used   Vaping Use    Vaping Use: Never used   Substance and Sexual Activity    Alcohol use: Yes     Comment: social drinking     Drug use: No    Sexual activity: Not on file   Other Topics Concern    Not on file   Social History Narrative    Not on file     Social Determinants of Health     Financial Resource Strain:     Difficulty of Paying Living Expenses: Not on file   Food Insecurity:     Worried About Running Out of Food in the Last Year: Not on file    Gabriel of Food in the Last Year: Not on file   Transportation Needs:     Lack of Transportation (Medical): Not on file    Lack of Transportation (Non-Medical):  Not on file   Physical Activity:     Days of Exercise per Week: Not on file    Minutes of Exercise per Session: Not on file   Stress:     Feeling of Stress : Not on file   Social Connections:     Frequency of Communication with Friends and Family: Not on file    Frequency of Social Gatherings with Friends and Family: Not on file    Attends Spiritism Services: Not on file    Active Member of Clubs or Organizations: Not on file    Attends Club or Organization Meetings: Not on file    Marital Status: Not on file   Intimate Partner Violence:     Fear of Current or Ex-Partner: Not on file    Emotionally Abused: Not on file    Physically Abused: Not on file    Sexually Abused: Not on file   Housing Stability:     Unable to Pay for Housing in the Last Year: Not on file    Number of [place] in the Last Year: Not on file    Unstable Housing in the Last Year: Not on file         Review of Systems   Constitutional: Negative for activity change, appetite change, chills, diaphoresis, fatigue and fever. HENT: Negative. Negative for congestion, dental problem, ear pain, facial swelling, rhinorrhea, sinus pressure, sneezing, sore throat, tinnitus, trouble swallowing and voice change. Eyes: Negative for photophobia, pain, discharge, redness, itching and visual disturbance. Respiratory: Negative for cough, chest tightness, shortness of breath, wheezing and stridor. Cardiovascular: Negative for chest pain, palpitations and leg swelling. Gastrointestinal: Negative for abdominal distention, abdominal pain, anal bleeding, blood in stool, constipation, diarrhea, nausea and vomiting. Genitourinary: Negative for difficulty urinating, dysuria, frequency, hematuria, penile discharge, testicular pain and urgency. Musculoskeletal: Positive for gait problem and myalgias (right calf pain and swelling. ). Negative for back pain, joint swelling, neck pain and neck stiffness. Skin: Negative for rash and wound. Neurological: Negative for dizziness, syncope, facial asymmetry, speech difficulty, weakness, numbness and headaches. Hematological: Does not bruise/bleed easily.    Psychiatric/Behavioral: Negative for agitation, confusion, hallucinations, self-injury, sleep disturbance and suicidal ideas. The patient is not nervous/anxious. All other systems reviewed and are negative. Physical Exam  Vitals and nursing note reviewed. Constitutional:       General: He is not in acute distress. Appearance: He is well-developed. HENT:      Head: Normocephalic and atraumatic. Right Ear: External ear normal.      Left Ear: External ear normal.      Nose: Nose normal.      Mouth/Throat:      Pharynx: No oropharyngeal exudate. Eyes:      General: No scleral icterus. Right eye: No discharge. Left eye: No discharge. Conjunctiva/sclera: Conjunctivae normal.      Pupils: Pupils are equal, round, and reactive to light. Neck:      Vascular: No JVD. Trachea: No tracheal deviation. Cardiovascular:      Rate and Rhythm: Normal rate and regular rhythm. Pulses:           Carotid pulses are 2+ on the right side and 2+ on the left side. Radial pulses are 2+ on the right side and 2+ on the left side. Femoral pulses are 2+ on the right side and 2+ on the left side. Popliteal pulses are 1+ on the right side and 2+ on the left side. Dorsalis pedis pulses are 2+ on the left side. Posterior tibial pulses are 1+ on the right side and 2+ on the left side. Heart sounds: Normal heart sounds. No murmur heard. No friction rub. No gallop. Pulmonary:      Effort: Pulmonary effort is normal. No respiratory distress. Breath sounds: Normal breath sounds. No wheezing or rales. Abdominal:      General: Bowel sounds are normal. There is no distension. Palpations: Abdomen is soft. There is no mass. Tenderness: There is no abdominal tenderness. There is no guarding or rebound. Musculoskeletal:         General: No tenderness. Normal range of motion. Cervical back: Normal range of motion and neck supple. Right lower leg: No edema. Left lower leg: No edema. Lymphadenopathy:      Cervical: No cervical adenopathy. Skin:     General: Skin is warm and dry. Coloration: Skin is not pale. Findings: No erythema or rash. Neurological:      Mental Status: He is alert and oriented to person, place, and time. Cranial Nerves: No cranial nerve deficit. Motor: No abnormal muscle tone. Coordination: Coordination normal.      Deep Tendon Reflexes: Reflexes are normal and symmetric. Reflexes normal.   Psychiatric:         Behavior: Behavior normal.         Thought Content: Thought content normal.         Judgment: Judgment normal.          Procedures     MDM   I discussed the nature and purpose, risks and benefits, as well as, the alternatives of obtaining ultrasound of the right lower extremity with Kendrick Coffey. Kendrick Coffey was given the time and opportunity to ask questions and consider their options, and after the discussion, Kendrick Coffey decided to refuse. I informed Kendrick Coffey that refusal could lead to, but was not limited to, death, permanent disability, or severe pain. If present, I asked the relatives or significant others of Kendrick Coffey to dissuade them without success. Prior to refusing, their nurse and I determined and agreed that Kendrick Coffey had the capacity to make this decision and understood the consequences of that decision. Kendrick Coffey signed the refusal of treatment form and their nurse signed the form agreeing that the patient/guardian had received informed consent. After refusal, I made every reasonable opportunity to treat Kendrick Coffey to the best of my ability. No results found for this visit on 02/26/22. Final Impression    1. Right leg pain    2. Tobacco use    3. Left against medical advice        Blood pressure (!) 157/122, pulse 106, temperature 97.2 °F (36.2 °C), temperature source Infrared, resp. rate 18, height 5' 10\" (1.778 m), weight 150 lb (68 kg), SpO2 97 %.        Eloisa Silence Pricilla Caballero MD  02/26/22 0013

## 2023-06-30 ENCOUNTER — INITIAL CONSULT (OUTPATIENT)
Dept: ONCOLOGY | Age: 53
End: 2023-06-30
Payer: MEDICAID

## 2023-06-30 ENCOUNTER — HOSPITAL ENCOUNTER (OUTPATIENT)
Dept: INFUSION THERAPY | Age: 53
Discharge: HOME | End: 2023-06-30
Payer: MEDICAID

## 2023-06-30 VITALS
OXYGEN SATURATION: 95 % | RESPIRATION RATE: 16 BRPM | WEIGHT: 142.8 LBS | HEIGHT: 70 IN | TEMPERATURE: 97 F | BODY MASS INDEX: 20.44 KG/M2 | SYSTOLIC BLOOD PRESSURE: 161 MMHG | DIASTOLIC BLOOD PRESSURE: 100 MMHG | HEART RATE: 84 BPM

## 2023-06-30 DIAGNOSIS — D68.59 HYPERCOAGULABLE STATE (HCC): Primary | ICD-10-CM

## 2023-06-30 DIAGNOSIS — Q85.00 NEUROFIBROMATOSIS, UNSPECIFIED (HCC): ICD-10-CM

## 2023-06-30 DIAGNOSIS — D68.59 HYPERCOAGULABLE STATE (HCC): ICD-10-CM

## 2023-06-30 LAB
ALBUMIN SERPL-MCNC: 4.4 GM/DL (ref 3.4–5)
ALP BLD-CCNC: 107 IU/L (ref 40–129)
ALT SERPL-CCNC: 10 U/L (ref 10–40)
ANION GAP SERPL CALCULATED.3IONS-SCNC: 13 MMOL/L (ref 4–16)
AST SERPL-CCNC: 15 IU/L (ref 15–37)
BASOPHILS ABSOLUTE: 0.1 K/CU MM
BASOPHILS RELATIVE PERCENT: 1.3 % (ref 0–1)
BILIRUB SERPL-MCNC: 0.5 MG/DL (ref 0–1)
BUN SERPL-MCNC: 7 MG/DL (ref 6–23)
CALCIUM SERPL-MCNC: 9.2 MG/DL (ref 8.3–10.6)
CHLORIDE BLD-SCNC: 98 MMOL/L (ref 99–110)
CO2: 24 MMOL/L (ref 21–32)
CREAT SERPL-MCNC: 0.8 MG/DL (ref 0.9–1.3)
DIFFERENTIAL TYPE: ABNORMAL
EOSINOPHILS ABSOLUTE: 0.2 K/CU MM
EOSINOPHILS RELATIVE PERCENT: 1.7 % (ref 0–3)
FERRITIN: 238 NG/ML (ref 30–400)
FOLATE SERPL-MCNC: 18.6 NG/ML (ref 3.1–17.5)
GFR SERPL CREATININE-BSD FRML MDRD: >60 ML/MIN/1.73M2
GLUCOSE SERPL-MCNC: 86 MG/DL (ref 70–99)
HCT VFR BLD CALC: 45.5 % (ref 42–52)
HCYS SERPL-SCNC: 13.6 UMOL/L (ref 0–10)
HEMOGLOBIN: 14.7 GM/DL (ref 13.5–18)
IRON: 42 UG/DL (ref 59–158)
LYMPHOCYTES ABSOLUTE: 1.8 K/CU MM
LYMPHOCYTES RELATIVE PERCENT: 16 % (ref 24–44)
MCH RBC QN AUTO: 29.6 PG (ref 27–31)
MCHC RBC AUTO-ENTMCNC: 32.3 % (ref 32–36)
MCV RBC AUTO: 91.5 FL (ref 78–100)
MONOCYTES ABSOLUTE: 1.2 K/CU MM
MONOCYTES RELATIVE PERCENT: 10.7 % (ref 0–4)
PCT TRANSFERRIN: 14 % (ref 10–44)
PDW BLD-RTO: 16.4 % (ref 11.7–14.9)
PLATELET # BLD: 294 K/CU MM (ref 140–440)
PMV BLD AUTO: 10.5 FL (ref 7.5–11.1)
POTASSIUM SERPL-SCNC: 4.6 MMOL/L (ref 3.5–5.1)
RBC # BLD: 4.97 M/CU MM (ref 4.6–6.2)
SEGMENTED NEUTROPHILS ABSOLUTE COUNT: 7.7 K/CU MM
SEGMENTED NEUTROPHILS RELATIVE PERCENT: 70.3 % (ref 36–66)
SODIUM BLD-SCNC: 135 MMOL/L (ref 135–145)
TOTAL IRON BINDING CAPACITY: 307 UG/DL (ref 250–450)
TOTAL PROTEIN: 6.8 GM/DL (ref 6.4–8.2)
UNSATURATED IRON BINDING CAPACITY: 265 UG/DL (ref 110–370)
VITAMIN B-12: 1007 PG/ML (ref 211–911)
WBC # BLD: 11 K/CU MM (ref 4–10.5)

## 2023-06-30 PROCEDURE — 3017F COLORECTAL CA SCREEN DOC REV: CPT | Performed by: INTERNAL MEDICINE

## 2023-06-30 PROCEDURE — 85303 CLOT INHIBIT PROT C ACTIVITY: CPT

## 2023-06-30 PROCEDURE — 85302 CLOT INHIBIT PROT C ANTIGEN: CPT

## 2023-06-30 PROCEDURE — 99214 OFFICE O/P EST MOD 30 MIN: CPT | Performed by: INTERNAL MEDICINE

## 2023-06-30 PROCEDURE — 83550 IRON BINDING TEST: CPT

## 2023-06-30 PROCEDURE — 83090 ASSAY OF HOMOCYSTEINE: CPT

## 2023-06-30 PROCEDURE — 80053 COMPREHEN METABOLIC PANEL: CPT

## 2023-06-30 PROCEDURE — 82728 ASSAY OF FERRITIN: CPT

## 2023-06-30 PROCEDURE — 86147 CARDIOLIPIN ANTIBODY EA IG: CPT

## 2023-06-30 PROCEDURE — 85025 COMPLETE CBC W/AUTO DIFF WBC: CPT

## 2023-06-30 PROCEDURE — 99211 OFF/OP EST MAY X REQ PHY/QHP: CPT

## 2023-06-30 PROCEDURE — 3078F DIAST BP <80 MM HG: CPT | Performed by: INTERNAL MEDICINE

## 2023-06-30 PROCEDURE — 85300 ANTITHROMBIN III ACTIVITY: CPT

## 2023-06-30 PROCEDURE — 86146 BETA-2 GLYCOPROTEIN ANTIBODY: CPT

## 2023-06-30 PROCEDURE — 82746 ASSAY OF FOLIC ACID SERUM: CPT

## 2023-06-30 PROCEDURE — 3074F SYST BP LT 130 MM HG: CPT | Performed by: INTERNAL MEDICINE

## 2023-06-30 PROCEDURE — 85305 CLOT INHIBIT PROT S TOTAL: CPT

## 2023-06-30 PROCEDURE — G8420 CALC BMI NORM PARAMETERS: HCPCS | Performed by: INTERNAL MEDICINE

## 2023-06-30 PROCEDURE — 36415 COLL VENOUS BLD VENIPUNCTURE: CPT

## 2023-06-30 PROCEDURE — G8427 DOCREV CUR MEDS BY ELIG CLIN: HCPCS | Performed by: INTERNAL MEDICINE

## 2023-06-30 PROCEDURE — 83540 ASSAY OF IRON: CPT

## 2023-06-30 PROCEDURE — 4004F PT TOBACCO SCREEN RCVD TLK: CPT | Performed by: INTERNAL MEDICINE

## 2023-06-30 PROCEDURE — 82607 VITAMIN B-12: CPT

## 2023-06-30 RX ORDER — AMLODIPINE BESYLATE 10 MG/1
10 TABLET ORAL DAILY
COMMUNITY

## 2023-06-30 RX ORDER — LISINOPRIL 2.5 MG/1
2.5 TABLET ORAL DAILY
COMMUNITY

## 2023-06-30 ASSESSMENT — PATIENT HEALTH QUESTIONNAIRE - PHQ9
1. LITTLE INTEREST OR PLEASURE IN DOING THINGS: 1
SUM OF ALL RESPONSES TO PHQ QUESTIONS 1-9: 1

## 2023-07-02 LAB
AT III ACT/NOR PPP CHRO: 104 % (ref 76–128)
PROTEIN C ACTIVITY: 148 % (ref 83–168)
PROTEIN S ACTIVITY: 84 % (ref 66–143)

## 2023-07-03 LAB
ANTICARDIOLIPIN IGG ANTIBODY: <10 GPL
BETA 2 GLYCOPROT.1 IGA AB: <10 SAU
BETA 2 GLYCOPROT.1 IGM AB: <10 SMU
BETA-2 GLYCOPROTEIN 1 IGG ANTIBODY: <10 SGU
CARDIOLIPIN IGA SER IA-ACNC: <10 APL
CARDIOLIPIN IGM SER IA-ACNC: 12 MPL
PROTEIN C ANTIGEN: >95 % (ref 63–153)
PROTEIN S ANTIGEN, TOTAL: 151 % (ref 84–134)

## 2023-07-06 NOTE — PROGRESS NOTES
Patient Name: Zeny Barrera  Patient : 1970  Patient MRN: [id number]     Primary Oncologist: Shayla Kessler MD  Referring Provider: TITUS Morales CNP     Date of Service: 2023      Chief Complaint:   Chief Complaint   Patient presents with    Follow-up    Results     Referred for hyper coag. Patient Active Problem List:     Acute pancreatitis     Hypertension     Non compliance with medical treatment     Tobacco abuse     Alcohol abuse     Mass of skin of left shoulder     Deep vein thrombosis (DVT) of left lower extremity (HCC)     PAD (peripheral artery disease) (HCC)     Acute deep vein thrombosis (DVT) of proximal vein of left lower extremity (HCC)     Leg swelling     Saddle pulmonary embolism      HPI:   Ese Huerta is a pleasant 46 yo male patient who was referred for evaluation hyper coag. He is not a good historian.    2021 he went to ED  with leg swelling x 3 days. Venous doppler 21  Extensive occlusive thrombus left common femoral vein, greater saphenous vein and deep femoral vein. Partial occluding thrombus left femoral vein extending into the popliteal vein     He has previous DVT and reported multiple PE in 2015. He has not followed up with refilling Eliquis. He reported long distance travel often. Denied recent injury, instrumentation, no testosterone use  He formerly was a patient of . TITUS Alonzo CNP   Reports he no longer follows with her  He denies pervious cancer history  He does have known lung nodules since 2019- largest of which was 1.6 x 0.9; he reports he was unaware of this  He denies family history of cancer  DC home with [facility]. He was non adherent to [facility].   he presented to ED for abdominal pain, and dyspnea. 21 CT chest  1. Acute saddle pulmonary embolus extending into the upper and lower lobe  pulmonary arteries bilaterally. There is a suggestion of right heart strain.   These findings were discussed with Dr Jose Francisco Livingston

## 2023-08-01 ENCOUNTER — OFFICE VISIT (OUTPATIENT)
Dept: ONCOLOGY | Age: 53
End: 2023-08-01

## 2023-08-01 ENCOUNTER — HOSPITAL ENCOUNTER (OUTPATIENT)
Dept: INFUSION THERAPY | Age: 53
Discharge: HOME | End: 2023-08-01
Payer: MEDICAID

## 2023-08-01 VITALS — BODY MASS INDEX: 21.1 KG/M2 | HEIGHT: 70 IN | WEIGHT: 147.4 LBS

## 2023-08-01 DIAGNOSIS — F17.200 SMOKER: Primary | ICD-10-CM

## 2023-08-01 PROCEDURE — 99211 OFF/OP EST MAY X REQ PHY/QHP: CPT

## 2023-08-01 NOTE — PROGRESS NOTES
Hereditary Cancer Risk Assessment     Name: Erica Quiroga   YOB: 1970   Date of Consultation: 23     Mr. Han Patricio was seen at the [facility] for genetic counseling on 23. Mr. Han Patricio was referred by Tan Weinberg MD due to his personal history of lisch nodules and neurofibromas. PERSONAL HISTORY   Mr. Han Patricio is a 47 y.o.  male with no personal history of cancer. He reports:     No previous genetic counseling  No known familial mutation  Not adopted, Nor a twin  First Colonoscopy - cologaurd +, going for colonscopy  Number of total polyps unknown  Has had EGD- never  Smokes 1/2 ppd  Etoh none \"quit drinking\"      FAMILY HISTORY  Mr. Han Patricio has     Mother: skin cancer, age unknown, living  Maternal Grandmother: no known cancer, age of death unknown  Maternal Grandfather: prostate cancer, metastatic?? Unknown age of onset or death  Father: no known cancer, , had neurofibromas  Paternal Grandfather: uterine cancer, unknown age of death  Paternal Grandmother: brain cancer, primary, unknown age of diagnosis or death  Son: unaffected, living 28  Sister: unaffected, living 48  Maternal Aunt: no known cancer, living 70  Maternal Uncle: \"everywhere\" dx 68,  68  Paternal Aunt: no known cancer, living 68  Paternal Aunt: no known cancer 76  Paternal Aunt: no known cancer 68 living  Paternal Uncle: lung cancer, dx 80,  80  Paternal Uncle:no known cancer,  80  Paternal Uncle: no known cancer, living 80        Mr. aHn Patricio reports  ancestry and denies any known Ashkenazi [employer] heritage. RISK ASSESSMENT   We discussed that approximately 5-10% of cancers are due to a hereditary gene mutation which causes an increased risk for certain cancers. Hereditary cancers are typically diagnosed at younger ages (under age 46y) and occur in multiple generations of a family.  Multiple individuals with the same type of cancer (example: breast or

## 2023-08-04 ENCOUNTER — OFFICE VISIT (OUTPATIENT)
Dept: ONCOLOGY | Age: 53
End: 2023-08-04
Payer: MEDICAID

## 2023-08-04 ENCOUNTER — HOSPITAL ENCOUNTER (OUTPATIENT)
Dept: INFUSION THERAPY | Age: 53
Discharge: HOME | End: 2023-08-04
Payer: MEDICAID

## 2023-08-04 VITALS
HEART RATE: 84 BPM | OXYGEN SATURATION: 95 % | HEIGHT: 70 IN | SYSTOLIC BLOOD PRESSURE: 169 MMHG | BODY MASS INDEX: 20.84 KG/M2 | WEIGHT: 145.6 LBS | DIASTOLIC BLOOD PRESSURE: 109 MMHG | TEMPERATURE: 98 F

## 2023-08-04 DIAGNOSIS — D68.59 HYPERCOAGULABLE STATE (HCC): Primary | ICD-10-CM

## 2023-08-04 DIAGNOSIS — D64.9 ANEMIA, UNSPECIFIED TYPE: ICD-10-CM

## 2023-08-04 PROCEDURE — 3080F DIAST BP >= 90 MM HG: CPT | Performed by: INTERNAL MEDICINE

## 2023-08-04 PROCEDURE — G8420 CALC BMI NORM PARAMETERS: HCPCS | Performed by: INTERNAL MEDICINE

## 2023-08-04 PROCEDURE — 4004F PT TOBACCO SCREEN RCVD TLK: CPT | Performed by: INTERNAL MEDICINE

## 2023-08-04 PROCEDURE — 3077F SYST BP >= 140 MM HG: CPT | Performed by: INTERNAL MEDICINE

## 2023-08-04 PROCEDURE — 99211 OFF/OP EST MAY X REQ PHY/QHP: CPT

## 2023-08-04 PROCEDURE — 99213 OFFICE O/P EST LOW 20 MIN: CPT | Performed by: INTERNAL MEDICINE

## 2023-08-04 PROCEDURE — G8427 DOCREV CUR MEDS BY ELIG CLIN: HCPCS | Performed by: INTERNAL MEDICINE

## 2023-08-04 PROCEDURE — 3017F COLORECTAL CA SCREEN DOC REV: CPT | Performed by: INTERNAL MEDICINE

## 2023-08-04 RX ORDER — ALBUTEROL SULFATE 90 UG/1
AEROSOL, METERED RESPIRATORY (INHALATION)
COMMUNITY
Start: 2023-07-25

## 2023-08-04 RX ORDER — FLUOXETINE HYDROCHLORIDE 20 MG/1
CAPSULE ORAL
COMMUNITY
Start: 2023-05-20

## 2023-08-04 RX ORDER — NALTREXONE 380 MG
KIT INTRAMUSCULAR
COMMUNITY
Start: 2023-07-27

## 2023-08-04 RX ORDER — BUDESONIDE AND FORMOTEROL FUMARATE DIHYDRATE 160; 4.5 UG/1; UG/1
AEROSOL RESPIRATORY (INHALATION)
COMMUNITY
Start: 2023-07-25

## 2023-08-04 RX ORDER — ROSUVASTATIN CALCIUM 10 MG/1
TABLET, COATED ORAL
COMMUNITY
Start: 2023-05-20

## 2023-08-04 RX ORDER — OMEPRAZOLE 20 MG/1
CAPSULE, DELAYED RELEASE ORAL
COMMUNITY
Start: 2023-05-20

## 2023-08-04 RX ORDER — HYDROXYZINE HYDROCHLORIDE 25 MG/1
TABLET, FILM COATED ORAL
COMMUNITY
Start: 2023-08-01

## 2023-08-04 ASSESSMENT — PATIENT HEALTH QUESTIONNAIRE - PHQ9
SUM OF ALL RESPONSES TO PHQ QUESTIONS 1-9: 0
SUM OF ALL RESPONSES TO PHQ9 QUESTIONS 1 & 2: 0
SUM OF ALL RESPONSES TO PHQ QUESTIONS 1-9: 0
SUM OF ALL RESPONSES TO PHQ QUESTIONS 1-9: 0
1. LITTLE INTEREST OR PLEASURE IN DOING THINGS: 0
SUM OF ALL RESPONSES TO PHQ QUESTIONS 1-9: 0
2. FEELING DOWN, DEPRESSED OR HOPELESS: 0

## 2023-08-04 NOTE — PROGRESS NOTES
MA Rooming Questions  Patient: Roman Doe  MRN: [id number]    Date: 8/4/2023        1. Do you have any new issues?   no         2. Do you need any refills on medications?    no    3. Have you had any imaging done since your last visit?   no    4. Have you been hospitalized or seen in the emergency room since your last visit here?   no    5. Did the patient have a depression screening completed today?  Yes    PHQ-9 Total Score: 0 (8/4/2023  1:36 PM)       PHQ-9 Given to (if applicable):               PHQ-9 Score (if applicable):                     [] Positive     []  Negative              Does question #9 need addressed (if applicable)                     [] Yes    []  No               Kaveh Castellon CMA

## 2023-08-07 ENCOUNTER — CLINICAL DOCUMENTATION (OUTPATIENT)
Dept: ONCOLOGY | Age: 53
End: 2023-08-07

## 2023-08-15 ENCOUNTER — CLINICAL DOCUMENTATION (OUTPATIENT)
Dept: ONCOLOGY | Age: 53
End: 2023-08-15

## 2023-08-15 NOTE — PROGRESS NOTES
[facility]   Hereditary Cancer Risk Assessment      Name: Ness Sy  YOB: 1970  Date of Results Disclosure: 8/15/23     HISTORY   Mr. Chuck Buchanan was seen for genetic counseling on 8/1/23 at the request of Dr. Deepika Mims due to his personal and family history. At that time, Mr. Chuck Buchanan chose to pursue genetic testing for NF1 with  gene panel. These results were discussed with Mr. Chuck Buchanan on 8/15/23. A summary of Mr. Kylee Sánchez results and recommendations are below. RESULTS       Mr. 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 NF1 is characterized by:  Cafe au lait spots (darkened spots on the skin)  Skin freckling in the armpits and/or groin  Non cancerous growths including neurofibromas (occurs on the nerves) and gliomas (on the optic nerve or brain)  Lisch nodules (small benign markings of the iris of the eye)  Skeletal dysplasia (enlargement or abnormalities of certain bones)   Learning and/or behavioral disabilities    NF1 can also increase the risk for certain cancers including female breast cancer (15-40% lifetime risk), brain tumors, gastrointestinal stromal tumors (GIST), leukemia, paraganglioma, pheochromocytomas. RECOMMENDATIONS   1) Referral to a neurofibromatosis specialist is encouraged to coordinate management of other NF1 related lesions. RECOMMENDATIONS FOR FAMILY MEMBERS   First degree relatives (parents, siblings, and children) of individuals who carry a NF1 mutation each have a 50% chance to inherit the familial mutation. 1) Therefore, we recommend that Mr. Kylee Sánchez children, siblings consider genetic counseling and testing to clarify their carrier status. 2) Due to the family history of cancer on both his maternal and paternal side, it is difficult to determine which side of the family the NF1 mutation was inherited from.  Thus, we recommend that both his paternal

## 2023-08-26 ENCOUNTER — TELEPHONE (OUTPATIENT)
Dept: ONCOLOGY | Age: 53
End: 2023-08-26

## 2023-08-26 NOTE — TELEPHONE ENCOUNTER
Called patient to inform of delay from genetic testing company. To call when results available, patient verbalized understanding.

## 2023-09-26 NOTE — DISCHARGE SUMMARY
I sent in some clonazepam very low dose with directions to [facility] and the [facility] in [place] just if needed. Thanks, Gisell   Patient orders reviewed and verified. Patient was educated on the medications and the medicated was given as per order. Upon leaving room needs met and patient denies any further needs at this time.  Will monitor

## 2023-10-09 ENCOUNTER — TELEPHONE (OUTPATIENT)
Dept: ONCOLOGY | Age: 53
End: 2023-10-09

## 2023-10-09 NOTE — TELEPHONE ENCOUNTER
Called to report negative genetics, no answer, VM left with return number. Known NF1 mutation present, otherwise negative gene panel.

## 2023-10-19 ENCOUNTER — TELEPHONE (OUTPATIENT)
Dept: ONCOLOGY | Age: 53
End: 2023-10-19

## 2023-10-19 NOTE — TELEPHONE ENCOUNTER
Attempted to call with full genetic panel results, only note known NF1 mutation, no answer. VM with call back left.

## 2023-12-13 ENCOUNTER — HOSPITAL ENCOUNTER (OUTPATIENT)
Dept: INFUSION THERAPY | Age: 53
Discharge: HOME | End: 2023-12-13
Payer: MEDICAID

## 2023-12-13 DIAGNOSIS — D68.59 HYPERCOAGULABLE STATE (HCC): ICD-10-CM

## 2023-12-13 DIAGNOSIS — D64.9 ANEMIA, UNSPECIFIED TYPE: ICD-10-CM

## 2023-12-13 DIAGNOSIS — Q85.00 NEUROFIBROMATOSIS, UNSPECIFIED (HCC): ICD-10-CM

## 2023-12-13 LAB
ALBUMIN SERPL-MCNC: 4 GM/DL (ref 3.4–5)
ALP BLD-CCNC: 119 IU/L (ref 40–128)
ALT SERPL-CCNC: 17 U/L (ref 10–40)
ANION GAP SERPL CALCULATED.3IONS-SCNC: 12 MMOL/L (ref 4–16)
AST SERPL-CCNC: 18 IU/L (ref 15–37)
BASOPHILS ABSOLUTE: 0.1 K/CU MM
BASOPHILS RELATIVE PERCENT: 1.6 % (ref 0–1)
BILIRUB SERPL-MCNC: 0.2 MG/DL (ref 0–1)
BUN SERPL-MCNC: 4 MG/DL (ref 6–23)
CALCIUM SERPL-MCNC: 8.8 MG/DL (ref 8.3–10.6)
CHLORIDE BLD-SCNC: 99 MMOL/L (ref 99–110)
CO2: 26 MMOL/L (ref 21–32)
CREAT SERPL-MCNC: 0.7 MG/DL (ref 0.9–1.3)
DIFFERENTIAL TYPE: ABNORMAL
EOSINOPHILS ABSOLUTE: 0.2 K/CU MM
EOSINOPHILS RELATIVE PERCENT: 3.6 % (ref 0–3)
GFR SERPL CREATININE-BSD FRML MDRD: >60 ML/MIN/1.73M2
GLUCOSE SERPL-MCNC: 100 MG/DL (ref 70–99)
HCT VFR BLD CALC: 49.1 % (ref 42–52)
HCYS SERPL-SCNC: 17.5 UMOL/L (ref 0–10)
HEMOGLOBIN: 15.8 GM/DL (ref 13.5–18)
LYMPHOCYTES ABSOLUTE: 2.1 K/CU MM
LYMPHOCYTES RELATIVE PERCENT: 37.1 % (ref 24–44)
MCH RBC QN AUTO: 29.1 PG (ref 27–31)
MCHC RBC AUTO-ENTMCNC: 32.2 % (ref 32–36)
MCV RBC AUTO: 90.4 FL (ref 78–100)
MONOCYTES ABSOLUTE: 0.8 K/CU MM
MONOCYTES RELATIVE PERCENT: 13.5 % (ref 0–4)
PDW BLD-RTO: 18.2 % (ref 11.7–14.9)
PLATELET # BLD: 440 K/CU MM (ref 140–440)
PMV BLD AUTO: 9 FL (ref 7.5–11.1)
POTASSIUM SERPL-SCNC: 4.6 MMOL/L (ref 3.5–5.1)
RBC # BLD: 5.43 M/CU MM (ref 4.6–6.2)
SEGMENTED NEUTROPHILS ABSOLUTE COUNT: 2.5 K/CU MM
SEGMENTED NEUTROPHILS RELATIVE PERCENT: 44.2 % (ref 36–66)
SODIUM BLD-SCNC: 137 MMOL/L (ref 135–145)
TOTAL PROTEIN: 6.8 GM/DL (ref 6.4–8.2)
WBC # BLD: 5.6 K/CU MM (ref 4–10.5)

## 2023-12-13 PROCEDURE — 80053 COMPREHEN METABOLIC PANEL: CPT

## 2023-12-13 PROCEDURE — 85613 RUSSELL VIPER VENOM DILUTED: CPT

## 2023-12-13 PROCEDURE — 85025 COMPLETE CBC W/AUTO DIFF WBC: CPT

## 2023-12-13 PROCEDURE — 36415 COLL VENOUS BLD VENIPUNCTURE: CPT

## 2023-12-13 PROCEDURE — 83090 ASSAY OF HOMOCYSTEINE: CPT

## 2023-12-14 ENCOUNTER — CLINICAL DOCUMENTATION (OUTPATIENT)
Dept: ONCOLOGY | Age: 53
End: 2023-12-14

## 2023-12-14 NOTE — PROGRESS NOTES
Lab results from 12/13/2023 reviewed. Homocysteine 17.5. RX for [employer] e-scribed to [facility] per physician order. Attempted to call patient @ [phone] to notify and encourage to take medication; however, no answer. VM left with this RN direct number should patient have any questions.

## 2023-12-16 LAB
CONFIRM DRVVT: ABNORMAL RATIO
MIXING DRVVT: 41 SEC (ref 33–44)
SCREEN DRVVT: 45 SEC (ref 33–44)

## 2024-08-09 ENCOUNTER — HOSPITAL ENCOUNTER (EMERGENCY)
Age: 54
Discharge: LEFT BEFORE DISCHARGE | End: 2024-08-09
Attending: STUDENT IN AN ORGANIZED HEALTH CARE EDUCATION/TRAINING PROGRAM
Payer: MEDICAID

## 2024-08-09 ENCOUNTER — APPOINTMENT (OUTPATIENT)
Dept: CT IMAGING | Age: 54
End: 2024-08-09
Payer: MEDICAID

## 2024-08-09 ENCOUNTER — APPOINTMENT (OUTPATIENT)
Dept: GENERAL RADIOLOGY | Age: 54
End: 2024-08-09
Payer: MEDICAID

## 2024-08-09 ENCOUNTER — HOSPITAL ENCOUNTER (EMERGENCY)
Age: 54
Discharge: ANOTHER HOSPITAL | End: 2024-08-10
Attending: EMERGENCY MEDICINE
Payer: MEDICAID

## 2024-08-09 VITALS
WEIGHT: 145 LBS | DIASTOLIC BLOOD PRESSURE: 71 MMHG | SYSTOLIC BLOOD PRESSURE: 103 MMHG | BODY MASS INDEX: 20.76 KG/M2 | OXYGEN SATURATION: 98 % | TEMPERATURE: 97.8 F | RESPIRATION RATE: 18 BRPM | HEIGHT: 70 IN | HEART RATE: 88 BPM

## 2024-08-09 DIAGNOSIS — F10.939 ALCOHOL WITHDRAWAL SYNDROME WITH COMPLICATION (HCC): ICD-10-CM

## 2024-08-09 DIAGNOSIS — R06.2 WHEEZING: ICD-10-CM

## 2024-08-09 DIAGNOSIS — R09.02 HYPOXEMIA: ICD-10-CM

## 2024-08-09 DIAGNOSIS — E87.6 HYPOKALEMIA: ICD-10-CM

## 2024-08-09 DIAGNOSIS — J44.9 CHRONIC OBSTRUCTIVE PULMONARY DISEASE, UNSPECIFIED COPD TYPE (HCC): Primary | ICD-10-CM

## 2024-08-09 DIAGNOSIS — R06.02 SHORTNESS OF BREATH: Primary | ICD-10-CM

## 2024-08-09 LAB
ALBUMIN SERPL-MCNC: 3.5 G/DL (ref 3.5–5.2)
ALP SERPL-CCNC: 147 U/L (ref 40–129)
ALT SERPL-CCNC: 26 U/L (ref 0–40)
ANION GAP SERPL CALCULATED.3IONS-SCNC: 10 MMOL/L (ref 7–16)
APAP SERPL-MCNC: <5 UG/ML (ref 10–30)
AST SERPL-CCNC: 41 U/L (ref 0–39)
BASOPHILS # BLD: 0.06 K/UL (ref 0–0.2)
BASOPHILS NFR BLD: 1 % (ref 0–2)
BILIRUB SERPL-MCNC: 0.4 MG/DL (ref 0–1.2)
BUN SERPL-MCNC: 15 MG/DL (ref 6–20)
CALCIUM SERPL-MCNC: 9.1 MG/DL (ref 8.6–10.2)
CHLORIDE SERPL-SCNC: 101 MMOL/L (ref 98–107)
CO2 SERPL-SCNC: 29 MMOL/L (ref 22–29)
CREAT SERPL-MCNC: 0.7 MG/DL (ref 0.7–1.2)
D-DIMER QUANTITATIVE: <200 NG/ML DDU (ref 0–230)
EKG ATRIAL RATE: 85 BPM
EKG P AXIS: 51 DEGREES
EKG P-R INTERVAL: 172 MS
EKG Q-T INTERVAL: 406 MS
EKG QRS DURATION: 102 MS
EKG QTC CALCULATION (BAZETT): 483 MS
EKG R AXIS: 49 DEGREES
EKG T AXIS: 25 DEGREES
EKG VENTRICULAR RATE: 85 BPM
EOSINOPHIL # BLD: 0.18 K/UL (ref 0.05–0.5)
EOSINOPHILS RELATIVE PERCENT: 2 % (ref 0–6)
ERYTHROCYTE [DISTWIDTH] IN BLOOD BY AUTOMATED COUNT: 15.6 % (ref 11.5–15)
ETHANOLAMINE SERPL-MCNC: <10 MG/DL (ref 0–0.08)
GFR, ESTIMATED: >90 ML/MIN/1.73M2
GLUCOSE SERPL-MCNC: 98 MG/DL (ref 74–99)
HCT VFR BLD AUTO: 36.2 % (ref 37–54)
HGB BLD-MCNC: 12.4 G/DL (ref 12.5–16.5)
IMM GRANULOCYTES # BLD AUTO: 0.03 K/UL (ref 0–0.58)
IMM GRANULOCYTES NFR BLD: 0 % (ref 0–5)
INR PPP: 1.7
LYMPHOCYTES NFR BLD: 1.14 K/UL (ref 1.5–4)
LYMPHOCYTES RELATIVE PERCENT: 15 % (ref 20–42)
MAGNESIUM SERPL-MCNC: 1.7 MG/DL (ref 1.6–2.6)
MCH RBC QN AUTO: 32.7 PG (ref 26–35)
MCHC RBC AUTO-ENTMCNC: 34.3 G/DL (ref 32–34.5)
MCV RBC AUTO: 95.5 FL (ref 80–99.9)
MONOCYTES NFR BLD: 0.99 K/UL (ref 0.1–0.95)
MONOCYTES NFR BLD: 13 % (ref 2–12)
NEUTROPHILS NFR BLD: 69 % (ref 43–80)
NEUTS SEG NFR BLD: 5.27 K/UL (ref 1.8–7.3)
PLATELET # BLD AUTO: 158 K/UL (ref 130–450)
PMV BLD AUTO: 10 FL (ref 7–12)
POTASSIUM SERPL-SCNC: 3.1 MMOL/L (ref 3.5–5)
PROT SERPL-MCNC: 6.1 G/DL (ref 6.4–8.3)
PROTHROMBIN TIME: 19 SEC (ref 9.3–12.4)
RBC # BLD AUTO: 3.79 M/UL (ref 3.8–5.8)
SALICYLATES SERPL-MCNC: <0.3 MG/DL (ref 0–30)
SODIUM SERPL-SCNC: 140 MMOL/L (ref 132–146)
TOXIC TRICYCLIC SC,BLOOD: NEGATIVE
TROPONIN I SERPL HS-MCNC: 19 NG/L (ref 0–11)
TROPONIN I SERPL HS-MCNC: 20 NG/L (ref 0–11)
WBC OTHER # BLD: 7.7 K/UL (ref 4.5–11.5)

## 2024-08-09 PROCEDURE — 6370000000 HC RX 637 (ALT 250 FOR IP): Performed by: EMERGENCY MEDICINE

## 2024-08-09 PROCEDURE — 6360000002 HC RX W HCPCS: Performed by: EMERGENCY MEDICINE

## 2024-08-09 PROCEDURE — 84484 ASSAY OF TROPONIN QUANT: CPT

## 2024-08-09 PROCEDURE — 85610 PROTHROMBIN TIME: CPT

## 2024-08-09 PROCEDURE — 71045 X-RAY EXAM CHEST 1 VIEW: CPT

## 2024-08-09 PROCEDURE — 70450 CT HEAD/BRAIN W/O DYE: CPT

## 2024-08-09 PROCEDURE — 80053 COMPREHEN METABOLIC PANEL: CPT

## 2024-08-09 PROCEDURE — 93005 ELECTROCARDIOGRAM TRACING: CPT | Performed by: STUDENT IN AN ORGANIZED HEALTH CARE EDUCATION/TRAINING PROGRAM

## 2024-08-09 PROCEDURE — 85379 FIBRIN DEGRADATION QUANT: CPT

## 2024-08-09 PROCEDURE — 80179 DRUG ASSAY SALICYLATE: CPT

## 2024-08-09 PROCEDURE — 85025 COMPLETE CBC W/AUTO DIFF WBC: CPT

## 2024-08-09 PROCEDURE — 96375 TX/PRO/DX INJ NEW DRUG ADDON: CPT

## 2024-08-09 PROCEDURE — 6370000000 HC RX 637 (ALT 250 FOR IP): Performed by: STUDENT IN AN ORGANIZED HEALTH CARE EDUCATION/TRAINING PROGRAM

## 2024-08-09 PROCEDURE — G0480 DRUG TEST DEF 1-7 CLASSES: HCPCS

## 2024-08-09 PROCEDURE — 80307 DRUG TEST PRSMV CHEM ANLYZR: CPT

## 2024-08-09 PROCEDURE — 99285 EMERGENCY DEPT VISIT HI MDM: CPT

## 2024-08-09 PROCEDURE — 96365 THER/PROPH/DIAG IV INF INIT: CPT

## 2024-08-09 PROCEDURE — 83735 ASSAY OF MAGNESIUM: CPT

## 2024-08-09 PROCEDURE — 80143 DRUG ASSAY ACETAMINOPHEN: CPT

## 2024-08-09 RX ORDER — LORAZEPAM 1 MG/1
2 TABLET ORAL ONCE
Status: COMPLETED | OUTPATIENT
Start: 2024-08-09 | End: 2024-08-09

## 2024-08-09 RX ORDER — DIAZEPAM 5 MG/1
5 TABLET ORAL ONCE
Status: COMPLETED | OUTPATIENT
Start: 2024-08-09 | End: 2024-08-09

## 2024-08-09 RX ORDER — IPRATROPIUM BROMIDE AND ALBUTEROL SULFATE 2.5; .5 MG/3ML; MG/3ML
1 SOLUTION RESPIRATORY (INHALATION) ONCE
Status: COMPLETED | OUTPATIENT
Start: 2024-08-09 | End: 2024-08-09

## 2024-08-09 RX ORDER — LORAZEPAM 1 MG/1
2 TABLET ORAL
Status: DISCONTINUED | OUTPATIENT
Start: 2024-08-09 | End: 2024-08-10 | Stop reason: HOSPADM

## 2024-08-09 RX ORDER — PREDNISONE 20 MG/1
20 TABLET ORAL 2 TIMES DAILY
Qty: 10 TABLET | Refills: 0 | Status: ON HOLD | OUTPATIENT
Start: 2024-08-09 | End: 2024-08-11 | Stop reason: HOSPADM

## 2024-08-09 RX ORDER — ALBUTEROL SULFATE 90 UG/1
2 AEROSOL, METERED RESPIRATORY (INHALATION) 4 TIMES DAILY PRN
Qty: 18 G | Refills: 0 | Status: SHIPPED | OUTPATIENT
Start: 2024-08-09

## 2024-08-09 RX ORDER — IPRATROPIUM BROMIDE AND ALBUTEROL SULFATE 2.5; .5 MG/3ML; MG/3ML
1 SOLUTION RESPIRATORY (INHALATION)
Status: COMPLETED | OUTPATIENT
Start: 2024-08-09 | End: 2024-08-09

## 2024-08-09 RX ORDER — DEXAMETHASONE SODIUM PHOSPHATE 10 MG/ML
10 INJECTION INTRAMUSCULAR; INTRAVENOUS EVERY 6 HOURS
Status: DISCONTINUED | OUTPATIENT
Start: 2024-08-09 | End: 2024-08-10 | Stop reason: HOSPADM

## 2024-08-09 RX ORDER — DOXYCYCLINE HYCLATE 100 MG
100 TABLET ORAL 2 TIMES DAILY
Qty: 20 TABLET | Refills: 0 | Status: ON HOLD | OUTPATIENT
Start: 2024-08-09 | End: 2024-08-11 | Stop reason: HOSPADM

## 2024-08-09 RX ORDER — DOXYCYCLINE HYCLATE 100 MG
100 TABLET ORAL 2 TIMES DAILY
Qty: 20 TABLET | Refills: 0 | Status: SHIPPED | OUTPATIENT
Start: 2024-08-09 | End: 2024-08-09

## 2024-08-09 RX ORDER — POTASSIUM CHLORIDE 20 MEQ/1
40 TABLET, EXTENDED RELEASE ORAL ONCE
Status: COMPLETED | OUTPATIENT
Start: 2024-08-09 | End: 2024-08-09

## 2024-08-09 RX ORDER — MAGNESIUM SULFATE IN WATER 40 MG/ML
2000 INJECTION, SOLUTION INTRAVENOUS ONCE
Status: COMPLETED | OUTPATIENT
Start: 2024-08-09 | End: 2024-08-09

## 2024-08-09 RX ADMIN — MAGNESIUM SULFATE HEPTAHYDRATE 2000 MG: 40 INJECTION, SOLUTION INTRAVENOUS at 20:38

## 2024-08-09 RX ADMIN — DIAZEPAM 5 MG: 5 TABLET ORAL at 16:38

## 2024-08-09 RX ADMIN — IPRATROPIUM BROMIDE AND ALBUTEROL SULFATE 1 DOSE: .5; 3 SOLUTION RESPIRATORY (INHALATION) at 20:36

## 2024-08-09 RX ADMIN — POTASSIUM CHLORIDE 40 MEQ: 1500 TABLET, EXTENDED RELEASE ORAL at 17:44

## 2024-08-09 RX ADMIN — IPRATROPIUM BROMIDE AND ALBUTEROL SULFATE 1 DOSE: .5; 3 SOLUTION RESPIRATORY (INHALATION) at 16:38

## 2024-08-09 RX ADMIN — LORAZEPAM 2 MG: 1 TABLET ORAL at 22:52

## 2024-08-09 RX ADMIN — IPRATROPIUM BROMIDE AND ALBUTEROL SULFATE 1 DOSE: .5; 3 SOLUTION RESPIRATORY (INHALATION) at 20:37

## 2024-08-09 RX ADMIN — DEXAMETHASONE SODIUM PHOSPHATE 10 MG: 10 INJECTION INTRAMUSCULAR; INTRAVENOUS at 20:37

## 2024-08-09 RX ADMIN — LORAZEPAM 2 MG: 1 TABLET ORAL at 21:05

## 2024-08-09 RX ADMIN — IPRATROPIUM BROMIDE AND ALBUTEROL SULFATE 1 DOSE: .5; 3 SOLUTION RESPIRATORY (INHALATION) at 20:34

## 2024-08-09 ASSESSMENT — PAIN DESCRIPTION - DESCRIPTORS: DESCRIPTORS: ACHING;SORE;DISCOMFORT

## 2024-08-09 ASSESSMENT — PAIN DESCRIPTION - PAIN TYPE
TYPE: ACUTE PAIN
TYPE: ACUTE PAIN

## 2024-08-09 ASSESSMENT — ENCOUNTER SYMPTOMS
DIARRHEA: 0
COUGH: 0
SHORTNESS OF BREATH: 1
ABDOMINAL PAIN: 0
PHOTOPHOBIA: 0
NAUSEA: 0
ABDOMINAL DISTENTION: 0
BACK PAIN: 0
VOMITING: 0
CHEST TIGHTNESS: 0

## 2024-08-09 ASSESSMENT — LIFESTYLE VARIABLES
HOW OFTEN DO YOU HAVE A DRINK CONTAINING ALCOHOL: 4 OR MORE TIMES A WEEK
HOW MANY STANDARD DRINKS CONTAINING ALCOHOL DO YOU HAVE ON A TYPICAL DAY: 10 OR MORE
HOW MANY STANDARD DRINKS CONTAINING ALCOHOL DO YOU HAVE ON A TYPICAL DAY: 10 OR MORE
HOW OFTEN DO YOU HAVE A DRINK CONTAINING ALCOHOL: 4 OR MORE TIMES A WEEK
HOW OFTEN DO YOU HAVE A DRINK CONTAINING ALCOHOL: 4 OR MORE TIMES A WEEK
HOW MANY STANDARD DRINKS CONTAINING ALCOHOL DO YOU HAVE ON A TYPICAL DAY: 10 OR MORE

## 2024-08-09 ASSESSMENT — PAIN DESCRIPTION - LOCATION
LOCATION: GENERALIZED
LOCATION: GENERALIZED

## 2024-08-09 ASSESSMENT — PAIN - FUNCTIONAL ASSESSMENT
PAIN_FUNCTIONAL_ASSESSMENT: 0-10
PAIN_FUNCTIONAL_ASSESSMENT: 0-10

## 2024-08-09 ASSESSMENT — PAIN DESCRIPTION - FREQUENCY
FREQUENCY: CONTINUOUS
FREQUENCY: CONTINUOUS

## 2024-08-09 ASSESSMENT — PAIN SCALES - GENERAL
PAINLEVEL_OUTOF10: 10
PAINLEVEL_OUTOF10: 8

## 2024-08-09 ASSESSMENT — PAIN DESCRIPTION - ONSET
ONSET: ON-GOING
ONSET: ON-GOING

## 2024-08-09 NOTE — ED PROVIDER NOTES
Red Arredondo is a 53-year-old male present emergency department concern for cough and shortness of breath.  Patient states he has had several months of shortness of breath he has noticed it more over the last week since he has been admitted for alcohol withdrawal.  Patient states he does have some symptoms of dizziness lightheadedness when he changes positions.  Patient states he is currently taking Valium for alcohol withdrawal patient states his last drink was Monday.  Patient denies fever, chills, nausea, vomiting.    The history is provided by the patient and medical records.        Review of Systems   Constitutional:  Negative for chills, diaphoresis, fatigue and fever.   Eyes:  Negative for photophobia and visual disturbance.   Respiratory:  Positive for shortness of breath. Negative for cough and chest tightness.    Cardiovascular:  Negative for chest pain, palpitations and leg swelling.   Gastrointestinal:  Negative for abdominal distention, abdominal pain, diarrhea, nausea and vomiting.   Genitourinary:  Negative for dysuria.   Musculoskeletal:  Negative for back pain, neck pain and neck stiffness.   Skin:  Negative for pallor and rash.   Neurological:  Positive for light-headedness. Negative for headaches.   Psychiatric/Behavioral:  Negative for confusion.         Physical Exam  Vitals and nursing note reviewed.   Constitutional:       General: He is not in acute distress.     Appearance: Normal appearance. He is not ill-appearing.   HENT:      Head: Normocephalic and atraumatic.   Eyes:      General: No scleral icterus.     Conjunctiva/sclera: Conjunctivae normal.      Pupils: Pupils are equal, round, and reactive to light.   Cardiovascular:      Rate and Rhythm: Normal rate and regular rhythm.   Pulmonary:      Effort: Pulmonary effort is normal.      Breath sounds: Wheezing present.   Abdominal:      General: Bowel sounds are normal. There is no distension.      Palpations: Abdomen is soft.       NEGATIVE NEGATIVE   Troponin   Result Value Ref Range    Troponin, High Sensitivity 20 (H) 0 - 11 ng/L   Magnesium   Result Value Ref Range    Magnesium 1.7 1.6 - 2.6 mg/dL   D-Dimer, Quantitative   Result Value Ref Range    D-Dimer, Quant <200 0 - 230 ng/mL DDU   EKG 12 Lead   Result Value Ref Range    Ventricular Rate 85 BPM    Atrial Rate 85 BPM    P-R Interval 172 ms    QRS Duration 102 ms    Q-T Interval 406 ms    QTc Calculation (Bazett) 483 ms    P Axis 51 degrees    R Axis 49 degrees    T Axis 25 degrees       Radiology:  XR CHEST PORTABLE    Result Date: 8/9/2024  EXAMINATION: ONE XRAY VIEW OF THE CHEST 8/9/2024 4:05 pm COMPARISON: None available. HISTORY: ORDERING SYSTEM PROVIDED HISTORY: pna TECHNOLOGIST PROVIDED HISTORY: Reason for exam:->pna FINDINGS: See impression.  Old granulomatous disease.     1. Emphysema. 2.  Hazy bibasilar and mid lung zone opacities likely atelectasis/scar or inflammatory disease. 3. ASVD.     CT HEAD WO CONTRAST    Result Date: 8/9/2024  EXAMINATION: CT OF THE HEAD WITHOUT CONTRAST  8/9/2024 4:04 pm TECHNIQUE: CT of the head was performed without the administration of intravenous contrast. Automated exposure control, iterative reconstruction, and/or weight based adjustment of the mA/kV was utilized to reduce the radiation dose to as low as reasonably achievable. COMPARISON: None. HISTORY: ORDERING SYSTEM PROVIDED HISTORY: dizziness TECHNOLOGIST PROVIDED HISTORY: Has a \"code stroke\" or \"stroke alert\" been called?->No Reason for exam:->dizziness Decision Support Exception - unselect if not a suspected or confirmed emergency medical condition->Emergency Medical Condition (MA) FINDINGS: BRAIN/VENTRICLES: There is no acute intracranial hemorrhage, mass effect or midline shift.  No abnormal extra-axial fluid collection.  The gray-white differentiation is maintained without evidence of an acute infarct.  There is no evidence of hydrocephalus. ORBITS: The visualized portion of the

## 2024-08-10 ENCOUNTER — HOSPITAL ENCOUNTER (INPATIENT)
Age: 54
LOS: 1 days | Discharge: HOME | DRG: 720 | End: 2024-08-13
Attending: STUDENT IN AN ORGANIZED HEALTH CARE EDUCATION/TRAINING PROGRAM | Admitting: STUDENT IN AN ORGANIZED HEALTH CARE EDUCATION/TRAINING PROGRAM
Payer: MEDICAID

## 2024-08-10 VITALS
SYSTOLIC BLOOD PRESSURE: 135 MMHG | TEMPERATURE: 97.7 F | DIASTOLIC BLOOD PRESSURE: 79 MMHG | HEART RATE: 96 BPM | RESPIRATION RATE: 20 BRPM | OXYGEN SATURATION: 91 %

## 2024-08-10 DIAGNOSIS — F10.939 ALCOHOL WITHDRAWAL SYNDROME WITH COMPLICATION (HCC): Primary | ICD-10-CM

## 2024-08-10 PROBLEM — J44.1 ACUTE EXACERBATION OF CHRONIC OBSTRUCTIVE PULMONARY DISEASE (COPD) (HCC): Status: ACTIVE | Noted: 2024-08-10

## 2024-08-10 LAB
ALBUMIN SERPL-MCNC: 3.6 G/DL (ref 3.5–5.2)
ALP SERPL-CCNC: 138 U/L (ref 40–129)
ALT SERPL-CCNC: 26 U/L (ref 0–40)
ANION GAP SERPL CALCULATED.3IONS-SCNC: 13 MMOL/L (ref 7–16)
AST SERPL-CCNC: 43 U/L (ref 0–39)
B PARAP IS1001 DNA NPH QL NAA+NON-PROBE: NOT DETECTED
B PERT DNA SPEC QL NAA+PROBE: NOT DETECTED
BILIRUB SERPL-MCNC: 0.4 MG/DL (ref 0–1.2)
BNP SERPL-MCNC: 1158 PG/ML (ref 0–125)
BUN SERPL-MCNC: 12 MG/DL (ref 6–20)
C PNEUM DNA NPH QL NAA+NON-PROBE: NOT DETECTED
CALCIUM SERPL-MCNC: 8.5 MG/DL (ref 8.6–10.2)
CHLORIDE SERPL-SCNC: 101 MMOL/L (ref 98–107)
CO2 SERPL-SCNC: 24 MMOL/L (ref 22–29)
CREAT SERPL-MCNC: 0.5 MG/DL (ref 0.7–1.2)
FLUAV RNA NPH QL NAA+NON-PROBE: NOT DETECTED
FLUBV RNA NPH QL NAA+NON-PROBE: NOT DETECTED
GFR, ESTIMATED: >90 ML/MIN/1.73M2
GLUCOSE SERPL-MCNC: 141 MG/DL (ref 74–99)
HADV DNA NPH QL NAA+NON-PROBE: NOT DETECTED
HCOV 229E RNA NPH QL NAA+NON-PROBE: NOT DETECTED
HCOV HKU1 RNA NPH QL NAA+NON-PROBE: NOT DETECTED
HCOV NL63 RNA NPH QL NAA+NON-PROBE: NOT DETECTED
HCOV OC43 RNA NPH QL NAA+NON-PROBE: NOT DETECTED
HMPV RNA NPH QL NAA+NON-PROBE: NOT DETECTED
HPIV1 RNA NPH QL NAA+NON-PROBE: NOT DETECTED
HPIV2 RNA NPH QL NAA+NON-PROBE: NOT DETECTED
HPIV3 RNA NPH QL NAA+NON-PROBE: NOT DETECTED
HPIV4 RNA NPH QL NAA+NON-PROBE: NOT DETECTED
M PNEUMO DNA NPH QL NAA+NON-PROBE: NOT DETECTED
MAGNESIUM SERPL-MCNC: 2 MG/DL (ref 1.6–2.6)
POTASSIUM SERPL-SCNC: 2.9 MMOL/L (ref 3.5–5)
PROT SERPL-MCNC: 6.6 G/DL (ref 6.4–8.3)
RSV RNA NPH QL NAA+NON-PROBE: NOT DETECTED
RV+EV RNA NPH QL NAA+NON-PROBE: NOT DETECTED
SARS-COV-2 RNA NPH QL NAA+NON-PROBE: NOT DETECTED
SODIUM SERPL-SCNC: 138 MMOL/L (ref 132–146)
SPECIMEN DESCRIPTION: NORMAL

## 2024-08-10 PROCEDURE — 0202U NFCT DS 22 TRGT SARS-COV-2: CPT

## 2024-08-10 PROCEDURE — 2580000003 HC RX 258: Performed by: STUDENT IN AN ORGANIZED HEALTH CARE EDUCATION/TRAINING PROGRAM

## 2024-08-10 PROCEDURE — 6360000002 HC RX W HCPCS: Performed by: STUDENT IN AN ORGANIZED HEALTH CARE EDUCATION/TRAINING PROGRAM

## 2024-08-10 PROCEDURE — 6370000000 HC RX 637 (ALT 250 FOR IP): Performed by: EMERGENCY MEDICINE

## 2024-08-10 PROCEDURE — G0378 HOSPITAL OBSERVATION PER HR: HCPCS

## 2024-08-10 PROCEDURE — 83880 ASSAY OF NATRIURETIC PEPTIDE: CPT

## 2024-08-10 PROCEDURE — 80053 COMPREHEN METABOLIC PANEL: CPT

## 2024-08-10 PROCEDURE — 96374 THER/PROPH/DIAG INJ IV PUSH: CPT

## 2024-08-10 PROCEDURE — 96375 TX/PRO/DX INJ NEW DRUG ADDON: CPT

## 2024-08-10 PROCEDURE — 6370000000 HC RX 637 (ALT 250 FOR IP): Performed by: STUDENT IN AN ORGANIZED HEALTH CARE EDUCATION/TRAINING PROGRAM

## 2024-08-10 PROCEDURE — 96376 TX/PRO/DX INJ SAME DRUG ADON: CPT

## 2024-08-10 PROCEDURE — 99223 1ST HOSP IP/OBS HIGH 75: CPT | Performed by: STUDENT IN AN ORGANIZED HEALTH CARE EDUCATION/TRAINING PROGRAM

## 2024-08-10 PROCEDURE — 83735 ASSAY OF MAGNESIUM: CPT

## 2024-08-10 PROCEDURE — 94664 DEMO&/EVAL PT USE INHALER: CPT

## 2024-08-10 PROCEDURE — 6370000000 HC RX 637 (ALT 250 FOR IP): Performed by: INTERNAL MEDICINE

## 2024-08-10 PROCEDURE — G0379 DIRECT REFER HOSPITAL OBSERV: HCPCS

## 2024-08-10 PROCEDURE — 94640 AIRWAY INHALATION TREATMENT: CPT

## 2024-08-10 RX ORDER — ROSUVASTATIN CALCIUM 10 MG/1
10 TABLET, COATED ORAL NIGHTLY
Status: DISCONTINUED | OUTPATIENT
Start: 2024-08-10 | End: 2024-08-13 | Stop reason: HOSPADM

## 2024-08-10 RX ORDER — POTASSIUM CHLORIDE 20 MEQ/1
40 TABLET, EXTENDED RELEASE ORAL PRN
Status: DISCONTINUED | OUTPATIENT
Start: 2024-08-10 | End: 2024-08-13 | Stop reason: HOSPADM

## 2024-08-10 RX ORDER — LISINOPRIL 2.5 MG/1
2.5 TABLET ORAL DAILY
Status: DISCONTINUED | OUTPATIENT
Start: 2024-08-10 | End: 2024-08-13 | Stop reason: HOSPADM

## 2024-08-10 RX ORDER — POLYETHYLENE GLYCOL 3350 17 G/17G
17 POWDER, FOR SOLUTION ORAL DAILY PRN
Status: DISCONTINUED | OUTPATIENT
Start: 2024-08-10 | End: 2024-08-13 | Stop reason: HOSPADM

## 2024-08-10 RX ORDER — SODIUM CHLORIDE 0.9 % (FLUSH) 0.9 %
5-40 SYRINGE (ML) INJECTION EVERY 12 HOURS SCHEDULED
Status: DISCONTINUED | OUTPATIENT
Start: 2024-08-10 | End: 2024-08-10 | Stop reason: SDUPTHER

## 2024-08-10 RX ORDER — SODIUM CHLORIDE 9 MG/ML
INJECTION, SOLUTION INTRAVENOUS PRN
Status: DISCONTINUED | OUTPATIENT
Start: 2024-08-10 | End: 2024-08-13 | Stop reason: HOSPADM

## 2024-08-10 RX ORDER — ACETAMINOPHEN 650 MG/1
650 SUPPOSITORY RECTAL EVERY 6 HOURS PRN
Status: DISCONTINUED | OUTPATIENT
Start: 2024-08-10 | End: 2024-08-13 | Stop reason: HOSPADM

## 2024-08-10 RX ORDER — LORAZEPAM 1 MG/1
2 TABLET ORAL
Status: DISCONTINUED | OUTPATIENT
Start: 2024-08-10 | End: 2024-08-13 | Stop reason: HOSPADM

## 2024-08-10 RX ORDER — ARFORMOTEROL TARTRATE 15 UG/2ML
15 SOLUTION RESPIRATORY (INHALATION)
Status: DISCONTINUED | OUTPATIENT
Start: 2024-08-10 | End: 2024-08-13 | Stop reason: HOSPADM

## 2024-08-10 RX ORDER — LORAZEPAM 2 MG/ML
4 INJECTION INTRAMUSCULAR
Status: DISCONTINUED | OUTPATIENT
Start: 2024-08-10 | End: 2024-08-13 | Stop reason: HOSPADM

## 2024-08-10 RX ORDER — METHYLPREDNISOLONE SODIUM SUCCINATE 40 MG/ML
40 INJECTION, POWDER, LYOPHILIZED, FOR SOLUTION INTRAMUSCULAR; INTRAVENOUS DAILY
Status: DISCONTINUED | OUTPATIENT
Start: 2024-08-10 | End: 2024-08-12

## 2024-08-10 RX ORDER — SODIUM CHLORIDE 0.9 % (FLUSH) 0.9 %
5-40 SYRINGE (ML) INJECTION PRN
Status: DISCONTINUED | OUTPATIENT
Start: 2024-08-10 | End: 2024-08-10 | Stop reason: SDUPTHER

## 2024-08-10 RX ORDER — ONDANSETRON 4 MG/1
4 TABLET, ORALLY DISINTEGRATING ORAL EVERY 8 HOURS PRN
Status: DISCONTINUED | OUTPATIENT
Start: 2024-08-10 | End: 2024-08-13 | Stop reason: HOSPADM

## 2024-08-10 RX ORDER — LANOLIN ALCOHOL/MO/W.PET/CERES
100 CREAM (GRAM) TOPICAL DAILY
Status: DISCONTINUED | OUTPATIENT
Start: 2024-08-10 | End: 2024-08-13 | Stop reason: HOSPADM

## 2024-08-10 RX ORDER — ONDANSETRON 2 MG/ML
4 INJECTION INTRAMUSCULAR; INTRAVENOUS EVERY 6 HOURS PRN
Status: DISCONTINUED | OUTPATIENT
Start: 2024-08-10 | End: 2024-08-13 | Stop reason: HOSPADM

## 2024-08-10 RX ORDER — PANTOPRAZOLE SODIUM 40 MG/1
40 TABLET, DELAYED RELEASE ORAL
Status: DISCONTINUED | OUTPATIENT
Start: 2024-08-10 | End: 2024-08-13 | Stop reason: HOSPADM

## 2024-08-10 RX ORDER — LORAZEPAM 1 MG/1
4 TABLET ORAL
Status: DISCONTINUED | OUTPATIENT
Start: 2024-08-10 | End: 2024-08-13 | Stop reason: HOSPADM

## 2024-08-10 RX ORDER — CHLORDIAZEPOXIDE HYDROCHLORIDE 25 MG/1
50 CAPSULE, GELATIN COATED ORAL 3 TIMES DAILY
Status: DISCONTINUED | OUTPATIENT
Start: 2024-08-10 | End: 2024-08-12

## 2024-08-10 RX ORDER — SODIUM CHLORIDE 0.9 % (FLUSH) 0.9 %
5-40 SYRINGE (ML) INJECTION PRN
Status: DISCONTINUED | OUTPATIENT
Start: 2024-08-10 | End: 2024-08-13 | Stop reason: HOSPADM

## 2024-08-10 RX ORDER — MAGNESIUM SULFATE IN WATER 40 MG/ML
2000 INJECTION, SOLUTION INTRAVENOUS PRN
Status: DISCONTINUED | OUTPATIENT
Start: 2024-08-10 | End: 2024-08-13 | Stop reason: HOSPADM

## 2024-08-10 RX ORDER — LORAZEPAM 1 MG/1
1 TABLET ORAL
Status: DISCONTINUED | OUTPATIENT
Start: 2024-08-10 | End: 2024-08-13 | Stop reason: HOSPADM

## 2024-08-10 RX ORDER — LORAZEPAM 2 MG/ML
1 INJECTION INTRAMUSCULAR
Status: DISCONTINUED | OUTPATIENT
Start: 2024-08-10 | End: 2024-08-13 | Stop reason: HOSPADM

## 2024-08-10 RX ORDER — SODIUM CHLORIDE 0.9 % (FLUSH) 0.9 %
5-40 SYRINGE (ML) INJECTION EVERY 12 HOURS SCHEDULED
Status: DISCONTINUED | OUTPATIENT
Start: 2024-08-10 | End: 2024-08-13 | Stop reason: HOSPADM

## 2024-08-10 RX ORDER — LORAZEPAM 1 MG/1
3 TABLET ORAL
Status: DISCONTINUED | OUTPATIENT
Start: 2024-08-10 | End: 2024-08-13 | Stop reason: HOSPADM

## 2024-08-10 RX ORDER — IPRATROPIUM BROMIDE AND ALBUTEROL SULFATE 2.5; .5 MG/3ML; MG/3ML
1 SOLUTION RESPIRATORY (INHALATION) EVERY 4 HOURS PRN
Status: DISCONTINUED | OUTPATIENT
Start: 2024-08-10 | End: 2024-08-13 | Stop reason: HOSPADM

## 2024-08-10 RX ORDER — ALBUTEROL SULFATE 90 UG/1
2 AEROSOL, METERED RESPIRATORY (INHALATION) 4 TIMES DAILY PRN
Status: DISCONTINUED | OUTPATIENT
Start: 2024-08-10 | End: 2024-08-10 | Stop reason: CLARIF

## 2024-08-10 RX ORDER — LORAZEPAM 2 MG/ML
2 INJECTION INTRAMUSCULAR
Status: DISCONTINUED | OUTPATIENT
Start: 2024-08-10 | End: 2024-08-13 | Stop reason: HOSPADM

## 2024-08-10 RX ORDER — ALBUTEROL SULFATE 2.5 MG/3ML
2.5 SOLUTION RESPIRATORY (INHALATION) 4 TIMES DAILY PRN
Status: DISCONTINUED | OUTPATIENT
Start: 2024-08-10 | End: 2024-08-13 | Stop reason: HOSPADM

## 2024-08-10 RX ORDER — BUDESONIDE 0.25 MG/2ML
0.25 INHALANT ORAL
Status: DISCONTINUED | OUTPATIENT
Start: 2024-08-10 | End: 2024-08-13 | Stop reason: HOSPADM

## 2024-08-10 RX ORDER — ACETAMINOPHEN 325 MG/1
650 TABLET ORAL EVERY 6 HOURS PRN
Status: DISCONTINUED | OUTPATIENT
Start: 2024-08-10 | End: 2024-08-13 | Stop reason: HOSPADM

## 2024-08-10 RX ORDER — SODIUM CHLORIDE 9 MG/ML
INJECTION, SOLUTION INTRAVENOUS CONTINUOUS
Status: DISCONTINUED | OUTPATIENT
Start: 2024-08-10 | End: 2024-08-11

## 2024-08-10 RX ORDER — LORAZEPAM 2 MG/ML
3 INJECTION INTRAMUSCULAR
Status: DISCONTINUED | OUTPATIENT
Start: 2024-08-10 | End: 2024-08-13 | Stop reason: HOSPADM

## 2024-08-10 RX ORDER — SODIUM CHLORIDE 9 MG/ML
INJECTION, SOLUTION INTRAVENOUS PRN
Status: DISCONTINUED | OUTPATIENT
Start: 2024-08-10 | End: 2024-08-10 | Stop reason: SDUPTHER

## 2024-08-10 RX ORDER — POTASSIUM CHLORIDE 7.45 MG/ML
10 INJECTION INTRAVENOUS PRN
Status: DISCONTINUED | OUTPATIENT
Start: 2024-08-10 | End: 2024-08-13 | Stop reason: HOSPADM

## 2024-08-10 RX ADMIN — Medication 100 MG: at 07:53

## 2024-08-10 RX ADMIN — LORAZEPAM 3 MG: 2 INJECTION INTRAMUSCULAR; INTRAVENOUS at 17:13

## 2024-08-10 RX ADMIN — ACETAMINOPHEN 650 MG: 325 TABLET ORAL at 23:18

## 2024-08-10 RX ADMIN — LORAZEPAM 2 MG: 1 TABLET ORAL at 19:45

## 2024-08-10 RX ADMIN — LORAZEPAM 3 MG: 2 INJECTION INTRAMUSCULAR; INTRAVENOUS at 11:01

## 2024-08-10 RX ADMIN — LORAZEPAM 3 MG: 1 TABLET ORAL at 23:18

## 2024-08-10 RX ADMIN — LISINOPRIL 2.5 MG: 2.5 TABLET ORAL at 07:53

## 2024-08-10 RX ADMIN — ARFORMOTEROL TARTRATE 15 MCG: 15 SOLUTION RESPIRATORY (INHALATION) at 21:02

## 2024-08-10 RX ADMIN — POTASSIUM CHLORIDE 10 MEQ: 10 INJECTION, SOLUTION INTRAVENOUS at 13:04

## 2024-08-10 RX ADMIN — POTASSIUM CHLORIDE 10 MEQ: 10 INJECTION, SOLUTION INTRAVENOUS at 08:46

## 2024-08-10 RX ADMIN — BUDESONIDE 250 MCG: 0.25 SUSPENSION RESPIRATORY (INHALATION) at 08:21

## 2024-08-10 RX ADMIN — ARFORMOTEROL TARTRATE 15 MCG: 15 SOLUTION RESPIRATORY (INHALATION) at 08:21

## 2024-08-10 RX ADMIN — ROSUVASTATIN CALCIUM 10 MG: 10 TABLET, FILM COATED ORAL at 19:45

## 2024-08-10 RX ADMIN — LORAZEPAM 2 MG: 2 INJECTION INTRAMUSCULAR; INTRAVENOUS at 16:07

## 2024-08-10 RX ADMIN — LORAZEPAM 1 MG: 2 INJECTION INTRAMUSCULAR; INTRAVENOUS at 14:26

## 2024-08-10 RX ADMIN — SODIUM CHLORIDE, PRESERVATIVE FREE 10 ML: 5 INJECTION INTRAVENOUS at 19:45

## 2024-08-10 RX ADMIN — POTASSIUM CHLORIDE 10 MEQ: 10 INJECTION, SOLUTION INTRAVENOUS at 11:41

## 2024-08-10 RX ADMIN — LORAZEPAM 2 MG: 1 TABLET ORAL at 01:56

## 2024-08-10 RX ADMIN — SODIUM CHLORIDE: 9 INJECTION, SOLUTION INTRAVENOUS at 05:18

## 2024-08-10 RX ADMIN — CHLORDIAZEPOXIDE HYDROCHLORIDE 50 MG: 25 CAPSULE ORAL at 17:13

## 2024-08-10 RX ADMIN — CHLORDIAZEPOXIDE HYDROCHLORIDE 50 MG: 25 CAPSULE ORAL at 19:45

## 2024-08-10 RX ADMIN — LORAZEPAM 2 MG: 2 INJECTION INTRAMUSCULAR; INTRAVENOUS at 09:28

## 2024-08-10 RX ADMIN — LORAZEPAM 2 MG: 1 TABLET ORAL at 05:18

## 2024-08-10 RX ADMIN — POTASSIUM CHLORIDE 10 MEQ: 10 INJECTION, SOLUTION INTRAVENOUS at 10:31

## 2024-08-10 RX ADMIN — POTASSIUM CHLORIDE 10 MEQ: 10 INJECTION, SOLUTION INTRAVENOUS at 14:20

## 2024-08-10 RX ADMIN — RIVAROXABAN 20 MG: 20 TABLET, FILM COATED ORAL at 07:53

## 2024-08-10 RX ADMIN — LORAZEPAM 2 MG: 2 INJECTION INTRAMUSCULAR; INTRAVENOUS at 06:56

## 2024-08-10 RX ADMIN — METHYLPREDNISOLONE SODIUM SUCCINATE 40 MG: 40 INJECTION INTRAMUSCULAR; INTRAVENOUS at 05:18

## 2024-08-10 RX ADMIN — BUDESONIDE 250 MCG: 0.25 SUSPENSION RESPIRATORY (INHALATION) at 21:02

## 2024-08-10 RX ADMIN — POTASSIUM CHLORIDE 10 MEQ: 10 INJECTION, SOLUTION INTRAVENOUS at 07:55

## 2024-08-10 RX ADMIN — PANTOPRAZOLE SODIUM 40 MG: 40 TABLET, DELAYED RELEASE ORAL at 05:18

## 2024-08-10 ASSESSMENT — PAIN SCALES - GENERAL
PAINLEVEL_OUTOF10: 0
PAINLEVEL_OUTOF10: 0

## 2024-08-10 ASSESSMENT — PAIN - FUNCTIONAL ASSESSMENT: PAIN_FUNCTIONAL_ASSESSMENT: NONE - DENIES PAIN

## 2024-08-10 NOTE — PROGRESS NOTES
Patient asking for belongings. No belongings found in room. Verified with staff at [facility] that patients belongings are still there.

## 2024-08-10 NOTE — ED NOTES
Patient ate meal with meat loaf and mashed potatoes and sprite. Tolerated well. Up to the bathroom without oxygen per self. Pulse ox when returned to bed was 90% on room air. Oxygen reapplied at 3l/nc.

## 2024-08-10 NOTE — ED NOTES
Patient got up to the bathroom per self without oxygen. Pulse ox when returned to bed was 91% on room air. Patient refuses to reapply oxygen. Sitting in chair at bedside

## 2024-08-10 NOTE — ED NOTES
Report called to [facility]. Spoke to RN; [facility] for transport will be 100am

## 2024-08-10 NOTE — ED NOTES
Patient Access called with room assignment :    Called [facility] for transport to [facility];  am  Patient notified of above

## 2024-08-10 NOTE — H&P
TAKE ONE CAPSULE BY MOUTH TWICE DAILY BEFORE A MEAL  Patient not taking: Reported on 8/9/2024 5/20/23   Reagan Rooney APRN - CNP   rosuvastatin (CRESTOR) 10 MG tablet TAKE ONE TABLET BY MOUTH ONCE EVERY DAY  Patient not taking: Reported on 8/9/2024 5/20/23   Reagan Rooney APRN - CNP   amLODIPine (NORVASC) 10 MG tablet Take 1 tablet by mouth daily  Patient not taking: Reported on 8/9/2024    Provider, MD Ruth   lisinopril (PRINIVIL;ZESTRIL) 2.5 MG tablet Take 1 tablet by mouth daily    ProviderRuth MD   dilTIAZem (CARDIZEM CD) 240 MG extended release capsule Take 1 capsule by mouth daily  Patient not taking: Reported on 6/30/2023 9/18/21   Marquis Andrew MD   metoprolol tartrate (LOPRESSOR) 25 MG tablet Take 0.5 tablets by mouth 2 times daily  Patient not taking: Reported on 6/30/2023 9/17/21   Marquis Andrew MD   rivaroxaban (XARELTO) 20 MG TABS tablet Take 1 tablet by mouth daily (with breakfast) Once 15mg bid for 21 days is done 10/6/21   Marquis Andrew MD       Allergies:    Patient has no known allergies.    Social History:    reports that he has been smoking cigarettes. He started smoking about 31 years ago. He has a 63.2 pack-year smoking history. He has been exposed to tobacco smoke. He has never used smokeless tobacco. He reports current alcohol use. He reports that he does not use drugs.    Family History:   family history includes Diabetes in his father; Kidney Disease in his father.       PHYSICAL EXAM:  Vitals:  /68   Pulse (!) 102   Temp 97.9 °F (36.6 °C) (Oral)   Resp 16   Wt 64.9 kg (143 lb 1.6 oz)   SpO2 93%   BMI 20.53 kg/m²     General Appearance: alert and oriented to person, place and time and in no acute distress  Skin: warm and dry  Head: normocephalic and atraumatic  Eyes: pupils equal, round, and reactive to light, extraocular eye movements intact, conjunctivae normal  Neck: neck supple and non tender without mass   Pulmonary/Chest: Decreased breath

## 2024-08-10 NOTE — ED PROVIDER NOTES
HPI:  8/9/24,   Time: 8:29 PM EDT         eRd Arredondo is a 53 y.o. male presenting to the ED for patient was discharged AMA because he was hypoxic and they would not take him back at the alcohol rehab center that he has been there since Monday which is when his last drink was he has been getting Valium, beginning days ago.  The complaint has been persistent, mild in severity, and worsened by nothing.  His workup was relatively unremarkable other than his potassium was low I did give him some more Decadron 3 DuoNeb treatments and started him on oxygen protocol as well as magnesium 2 g over 20 minutes    ROS:   Pertinent positives and negatives are stated within HPI, all other systems reviewed and are negative.  --------------------------------------------- PAST HISTORY ---------------------------------------------  Past Medical History:  has a past medical history of Acute pancreatitis, Alcohol abuse, Depression, H/O angiography, H/O cardiovascular stress test, H/O echocardiogram, History of complete ECG, Hx of blood clots, Hypertension, and Non compliance with medical treatment.    Past Surgical History:  has a past surgical history that includes skin biopsy; Cardiac catheterization (06/15/2021); CT GUIDED CHEST TUBE (4/29/2016); CT GUIDED CHEST TUBE (4/28/2016); CT GUIDED CHEST TUBE (4/27/2016); and CT GUIDED CHEST TUBE (4/26/2016).    Social History:  reports that he has been smoking cigarettes. He started smoking about 31 years ago. He has a 63.2 pack-year smoking history. He has been exposed to tobacco smoke. He has never used smokeless tobacco. He reports current alcohol use. He reports that he does not use drugs.    Family History: family history includes Diabetes in his father; Kidney Disease in his father.     The patient’s home medications have been reviewed.    Allergies: Patient has no known allergies.    -------------------------------------------------- RESULTS

## 2024-08-10 NOTE — PLAN OF CARE
Problem: Discharge Planning  Goal: Discharge to home or other facility with appropriate resources  8/10/2024 0914 by Kathrin Gresham RN  Outcome: Progressing  8/10/2024 0545 by Claudine Álvarez RN  Outcome: Progressing     Problem: Safety - Adult  Goal: Free from fall injury  8/10/2024 0914 by Kathrin Gresham RN  Outcome: Progressing  8/10/2024 0545 by Claudine Álvarez RN  Outcome: Progressing     Problem: Pain  Goal: Verbalizes/displays adequate comfort level or baseline comfort level  8/10/2024 0545 by Claudine Álvarez RN  Outcome: Progressing     Problem: Respiratory - Adult  Goal: Achieves optimal ventilation and oxygenation  8/10/2024 0545 by Claudine Álvarez RN  Outcome: Progressing

## 2024-08-10 NOTE — PLAN OF CARE
This is a 53-year-old male with past medical history of alcohol abuse, depression, hypertension, COPD.  He presents with COPD exacerbation.  Patient is been started on 2 L of oxygen.  Will start her on steroids as well as breathing treatments.  Will also place him on CIWA protocol.

## 2024-08-10 NOTE — PROGRESS NOTES
4 Eyes Skin Assessment     NAME:  Red Arredondo  YOB: 1970  MEDICAL RECORD NUMBER:  [id number]    The patient is being assessed for  Admission    I agree that at least one RN has performed a thorough Head to Toe Skin Assessment on the patient. ALL assessment sites listed below have been assessed.      Areas assessed by both nurses:    Head, Face, Ears, Shoulders, Back, Chest, Arms, Elbows, Hands, Sacrum. Buttock, Coccyx, Ischium, Legs. Feet and Heels, and Under Medical Devices         Does the Patient have a Wound? No noted wound(s)       Héctor Prevention initiated by RN: No  Wound Care Orders initiated by RN: No    Pressure Injury (Stage 3,4, Unstageable, DTI, NWPT, and Complex wounds) if present, place Wound referral order by RN under : No    New Ostomies, if present place, Ostomy referral order under : No     Nurse 1 eSignature: Electronically signed by Carmen Norris RN on 8/10/24 at 3:28 AM EDT    **SHARE this note so that the co-signing nurse can place an eSignature**    Nurse 2 eSignature: Electronically signed by Claudine Álvarez RN on 8/10/24 at 4:24 AM EDT

## 2024-08-11 LAB
ALBUMIN SERPL-MCNC: 3.8 G/DL (ref 3.5–5.2)
ALP SERPL-CCNC: 137 U/L (ref 40–129)
ALT SERPL-CCNC: 39 U/L (ref 0–40)
ANION GAP SERPL CALCULATED.3IONS-SCNC: 9 MMOL/L (ref 7–16)
AST SERPL-CCNC: 70 U/L (ref 0–39)
BASOPHILS # BLD: 0.07 K/UL (ref 0–0.2)
BASOPHILS NFR BLD: 1 % (ref 0–2)
BILIRUB SERPL-MCNC: 0.5 MG/DL (ref 0–1.2)
BUN SERPL-MCNC: 7 MG/DL (ref 6–20)
CALCIUM SERPL-MCNC: 8.9 MG/DL (ref 8.6–10.2)
CHLORIDE SERPL-SCNC: 108 MMOL/L (ref 98–107)
CO2 SERPL-SCNC: 23 MMOL/L (ref 22–29)
CREAT SERPL-MCNC: 0.5 MG/DL (ref 0.7–1.2)
EOSINOPHIL # BLD: 0.04 K/UL (ref 0.05–0.5)
EOSINOPHILS RELATIVE PERCENT: 0 % (ref 0–6)
ERYTHROCYTE [DISTWIDTH] IN BLOOD BY AUTOMATED COUNT: 16 % (ref 11.5–15)
GFR, ESTIMATED: >90 ML/MIN/1.73M2
GLUCOSE SERPL-MCNC: 88 MG/DL (ref 74–99)
HCT VFR BLD AUTO: 41.3 % (ref 37–54)
HGB BLD-MCNC: 13.5 G/DL (ref 12.5–16.5)
IMM GRANULOCYTES # BLD AUTO: 0.11 K/UL (ref 0–0.58)
IMM GRANULOCYTES NFR BLD: 1 % (ref 0–5)
LYMPHOCYTES NFR BLD: 1.66 K/UL (ref 1.5–4)
LYMPHOCYTES RELATIVE PERCENT: 14 % (ref 20–42)
MAGNESIUM SERPL-MCNC: 2 MG/DL (ref 1.6–2.6)
MCH RBC QN AUTO: 32.3 PG (ref 26–35)
MCHC RBC AUTO-ENTMCNC: 32.7 G/DL (ref 32–34.5)
MCV RBC AUTO: 98.8 FL (ref 80–99.9)
MONOCYTES NFR BLD: 1.36 K/UL (ref 0.1–0.95)
MONOCYTES NFR BLD: 12 % (ref 2–12)
NEUTROPHILS NFR BLD: 73 % (ref 43–80)
NEUTS SEG NFR BLD: 8.6 K/UL (ref 1.8–7.3)
PLATELET # BLD AUTO: 237 K/UL (ref 130–450)
PMV BLD AUTO: 10.5 FL (ref 7–12)
POTASSIUM SERPL-SCNC: 3.2 MMOL/L (ref 3.5–5)
PROT SERPL-MCNC: 6.9 G/DL (ref 6.4–8.3)
RBC # BLD AUTO: 4.18 M/UL (ref 3.8–5.8)
SODIUM SERPL-SCNC: 140 MMOL/L (ref 132–146)
WBC OTHER # BLD: 11.8 K/UL (ref 4.5–11.5)

## 2024-08-11 PROCEDURE — 99233 SBSQ HOSP IP/OBS HIGH 50: CPT | Performed by: INTERNAL MEDICINE

## 2024-08-11 PROCEDURE — 80053 COMPREHEN METABOLIC PANEL: CPT

## 2024-08-11 PROCEDURE — 96376 TX/PRO/DX INJ SAME DRUG ADON: CPT

## 2024-08-11 PROCEDURE — 85025 COMPLETE CBC W/AUTO DIFF WBC: CPT

## 2024-08-11 PROCEDURE — 6360000002 HC RX W HCPCS: Performed by: STUDENT IN AN ORGANIZED HEALTH CARE EDUCATION/TRAINING PROGRAM

## 2024-08-11 PROCEDURE — 6370000000 HC RX 637 (ALT 250 FOR IP): Performed by: STUDENT IN AN ORGANIZED HEALTH CARE EDUCATION/TRAINING PROGRAM

## 2024-08-11 PROCEDURE — G0378 HOSPITAL OBSERVATION PER HR: HCPCS

## 2024-08-11 PROCEDURE — 94640 AIRWAY INHALATION TREATMENT: CPT

## 2024-08-11 PROCEDURE — 6370000000 HC RX 637 (ALT 250 FOR IP): Performed by: INTERNAL MEDICINE

## 2024-08-11 PROCEDURE — 2580000003 HC RX 258: Performed by: STUDENT IN AN ORGANIZED HEALTH CARE EDUCATION/TRAINING PROGRAM

## 2024-08-11 PROCEDURE — 83735 ASSAY OF MAGNESIUM: CPT

## 2024-08-11 RX ORDER — DOXYCYCLINE HYCLATE 100 MG/1
100 CAPSULE ORAL EVERY 12 HOURS SCHEDULED
Status: DISCONTINUED | OUTPATIENT
Start: 2024-08-11 | End: 2024-08-13 | Stop reason: HOSPADM

## 2024-08-11 RX ORDER — LANOLIN ALCOHOL/MO/W.PET/CERES
100 CREAM (GRAM) TOPICAL DAILY
Qty: 30 TABLET | Refills: 3 | Status: SHIPPED | OUTPATIENT
Start: 2024-08-11

## 2024-08-11 RX ORDER — NICOTINE 21 MG/24HR
1 PATCH, TRANSDERMAL 24 HOURS TRANSDERMAL DAILY
Qty: 30 PATCH | Refills: 3 | Status: SHIPPED | OUTPATIENT
Start: 2024-08-11

## 2024-08-11 RX ORDER — PREDNISONE 20 MG/1
40 TABLET ORAL DAILY
Qty: 10 TABLET | Refills: 0 | Status: SHIPPED | OUTPATIENT
Start: 2024-08-11 | End: 2024-08-16

## 2024-08-11 RX ORDER — NICOTINE 21 MG/24HR
1 PATCH, TRANSDERMAL 24 HOURS TRANSDERMAL DAILY
Status: DISCONTINUED | OUTPATIENT
Start: 2024-08-11 | End: 2024-08-13 | Stop reason: HOSPADM

## 2024-08-11 RX ORDER — AMOXICILLIN AND CLAVULANATE POTASSIUM 875; 125 MG/1; MG/1
1 TABLET, FILM COATED ORAL EVERY 12 HOURS SCHEDULED
Status: DISCONTINUED | OUTPATIENT
Start: 2024-08-11 | End: 2024-08-13 | Stop reason: HOSPADM

## 2024-08-11 RX ORDER — LEVOFLOXACIN 750 MG/1
750 TABLET, FILM COATED ORAL DAILY
Qty: 5 TABLET | Refills: 0 | Status: SHIPPED | OUTPATIENT
Start: 2024-08-11 | End: 2024-08-16

## 2024-08-11 RX ADMIN — BUDESONIDE 250 MCG: 0.25 SUSPENSION RESPIRATORY (INHALATION) at 21:01

## 2024-08-11 RX ADMIN — DOXYCYCLINE HYCLATE 100 MG: 100 CAPSULE ORAL at 11:22

## 2024-08-11 RX ADMIN — Medication 100 MG: at 09:25

## 2024-08-11 RX ADMIN — SODIUM CHLORIDE, PRESERVATIVE FREE 10 ML: 5 INJECTION INTRAVENOUS at 09:25

## 2024-08-11 RX ADMIN — ARFORMOTEROL TARTRATE 15 MCG: 15 SOLUTION RESPIRATORY (INHALATION) at 21:01

## 2024-08-11 RX ADMIN — AMOXICILLIN AND CLAVULANATE POTASSIUM 1 TABLET: 875; 125 TABLET, FILM COATED ORAL at 11:22

## 2024-08-11 RX ADMIN — LORAZEPAM 2 MG: 1 TABLET ORAL at 20:19

## 2024-08-11 RX ADMIN — LORAZEPAM 2 MG: 2 INJECTION INTRAMUSCULAR; INTRAVENOUS at 04:28

## 2024-08-11 RX ADMIN — POTASSIUM CHLORIDE 40 MEQ: 1500 TABLET, EXTENDED RELEASE ORAL at 09:25

## 2024-08-11 RX ADMIN — METHYLPREDNISOLONE SODIUM SUCCINATE 40 MG: 40 INJECTION INTRAMUSCULAR; INTRAVENOUS at 09:25

## 2024-08-11 RX ADMIN — ROSUVASTATIN CALCIUM 10 MG: 10 TABLET, FILM COATED ORAL at 20:11

## 2024-08-11 RX ADMIN — BUDESONIDE 250 MCG: 0.25 SUSPENSION RESPIRATORY (INHALATION) at 09:06

## 2024-08-11 RX ADMIN — LORAZEPAM 4 MG: 2 INJECTION INTRAMUSCULAR; INTRAVENOUS at 09:25

## 2024-08-11 RX ADMIN — LORAZEPAM 4 MG: 2 INJECTION INTRAMUSCULAR; INTRAVENOUS at 10:49

## 2024-08-11 RX ADMIN — AMOXICILLIN AND CLAVULANATE POTASSIUM 1 TABLET: 875; 125 TABLET, FILM COATED ORAL at 20:10

## 2024-08-11 RX ADMIN — CHLORDIAZEPOXIDE HYDROCHLORIDE 50 MG: 25 CAPSULE ORAL at 20:10

## 2024-08-11 RX ADMIN — LORAZEPAM 2 MG: 2 INJECTION INTRAMUSCULAR; INTRAVENOUS at 01:20

## 2024-08-11 RX ADMIN — PANTOPRAZOLE SODIUM 40 MG: 40 TABLET, DELAYED RELEASE ORAL at 06:55

## 2024-08-11 RX ADMIN — RIVAROXABAN 20 MG: 20 TABLET, FILM COATED ORAL at 11:22

## 2024-08-11 RX ADMIN — ARFORMOTEROL TARTRATE 15 MCG: 15 SOLUTION RESPIRATORY (INHALATION) at 09:06

## 2024-08-11 RX ADMIN — LISINOPRIL 2.5 MG: 2.5 TABLET ORAL at 09:25

## 2024-08-11 RX ADMIN — DOXYCYCLINE HYCLATE 100 MG: 100 CAPSULE ORAL at 20:11

## 2024-08-11 RX ADMIN — SODIUM CHLORIDE, PRESERVATIVE FREE 10 ML: 5 INJECTION INTRAVENOUS at 20:11

## 2024-08-11 RX ADMIN — CHLORDIAZEPOXIDE HYDROCHLORIDE 50 MG: 25 CAPSULE ORAL at 09:25

## 2024-08-11 RX ADMIN — LORAZEPAM 2 MG: 2 INJECTION INTRAMUSCULAR; INTRAVENOUS at 06:55

## 2024-08-11 ASSESSMENT — PAIN SCALES - GENERAL: PAINLEVEL_OUTOF10: 3

## 2024-08-11 ASSESSMENT — PAIN DESCRIPTION - LOCATION: LOCATION: GENERALIZED

## 2024-08-11 NOTE — PROGRESS NOTES
Patient becoming agitated with IV fluids infusing into peripheral site. 1 site removed by patient and reinitiated by nursing. Patient still fidgeting with IV line - IVF removed from patient until more calm.

## 2024-08-11 NOTE — PROGRESS NOTES
Patient refusing to wear telemetry this evening, frequently asking for belongings (including cigarettes); patient re-oriented to place and reminded that he does not have his belongings here with him in the hospital and that they are secure at the rehab facility he had started treatment at earlier this week.

## 2024-08-11 NOTE — PROGRESS NOTES
[facility] Hospitalist Progress Note    Admitting Date and Time: 8/10/2024  3:20 AM  Admit Dx: Acute exacerbation of chronic obstructive pulmonary disease (COPD) ([facility]) [J44.1]    Subjective:  Patient is being followed for Acute exacerbation of chronic obstructive pulmonary disease (COPD) ([facility]) [J44.1]     No acute events overnight.    ROS: denies fever, chills, cp, sob, n/v, HA unless stated above.      nicotine  1 patch TransDERmal Daily    lisinopril  2.5 mg Oral Daily    pantoprazole  40 mg Oral QAM AC    rivaroxaban  20 mg Oral Daily with breakfast    rosuvastatin  10 mg Oral Nightly    methylPREDNISolone  40 mg IntraVENous Daily    sodium chloride flush  5-40 mL IntraVENous 2 times per day    thiamine  100 mg Oral Daily    budesonide  0.25 mg Nebulization BID RT    And    arformoterol tartrate  15 mcg Nebulization BID RT    chlordiazePOXIDE  50 mg Oral TID     potassium chloride, 40 mEq, PRN   Or  potassium alternative oral replacement, 40 mEq, PRN   Or  potassium chloride, 10 mEq, PRN  magnesium sulfate, 2,000 mg, PRN  ondansetron, 4 mg, Q8H PRN   Or  ondansetron, 4 mg, Q6H PRN  polyethylene glycol, 17 g, Daily PRN  acetaminophen, 650 mg, Q6H PRN   Or  acetaminophen, 650 mg, Q6H PRN  ipratropium 0.5 mg-albuterol 2.5 mg, 1 Dose, Q4H PRN  sodium chloride flush, 5-40 mL, PRN  sodium chloride, , PRN  LORazepam, 1 mg, Q1H PRN   Or  LORazepam, 1 mg, Q1H PRN   Or  LORazepam, 2 mg, Q1H PRN   Or  LORazepam, 2 mg, Q1H PRN   Or  LORazepam, 3 mg, Q1H PRN   Or  LORazepam, 3 mg, Q1H PRN   Or  LORazepam, 4 mg, Q1H PRN   Or  LORazepam, 4 mg, Q1H PRN  albuterol, 2.5 mg, 4x Daily PRN         Objective:    BP (!) 148/95   Pulse 95   Temp 98.5 °F (36.9 °C) (Axillary)   Resp 22   Ht 1.778 m (5' 10\")   Wt 60.6 kg (133 lb 9.6 oz)   SpO2 95%   BMI 19.17 kg/m²     General Appearance: alert and oriented to person  Skin: warm and dry  Head: normocephalic and atraumatic  Eyes: pupils equal, round, extraocular eye

## 2024-08-11 NOTE — PROGRESS NOTES
OK from Dr. Willett to order nicotine patches as patient keeps trying to exit room to smoke a cigarette. Staff continues to explain to patient that he does not have any of his belongings here at this time.

## 2024-08-11 NOTE — CARE COORDINATION
Met with patient about diagnosis and discharge plan of care. Pt admit for COPD exacerbation. Aerosol treatments. Iv solu medrol. Pt also admits to drinking 5-6 4lokos per day. CIWA scale, sitter at bedside. Pt lives with his mother in [facility]. States he does not move all day, feels weak. States he has a PCP but cannot remember who is it because he switched doctors. Pt IS RECEPTIVE to [facility]. Will need referral CALLED IN AM. States he has been to rehab in the past, but cannot remember where. WILL NEED TO FOLLOW UP IN AM-[facility]

## 2024-08-12 LAB
ALBUMIN SERPL-MCNC: 3.6 G/DL (ref 3.5–5.2)
ALP SERPL-CCNC: 122 U/L (ref 40–129)
ALT SERPL-CCNC: 39 U/L (ref 0–40)
ANION GAP SERPL CALCULATED.3IONS-SCNC: 8 MMOL/L (ref 7–16)
AST SERPL-CCNC: 45 U/L (ref 0–39)
BASOPHILS # BLD: 0.07 K/UL (ref 0–0.2)
BASOPHILS NFR BLD: 1 % (ref 0–2)
BILIRUB SERPL-MCNC: 0.5 MG/DL (ref 0–1.2)
BUN SERPL-MCNC: 10 MG/DL (ref 6–20)
CALCIUM SERPL-MCNC: 8.9 MG/DL (ref 8.6–10.2)
CHLORIDE SERPL-SCNC: 104 MMOL/L (ref 98–107)
CO2 SERPL-SCNC: 22 MMOL/L (ref 22–29)
CREAT SERPL-MCNC: 0.6 MG/DL (ref 0.7–1.2)
EKG ATRIAL RATE: 85 BPM
EKG P AXIS: 51 DEGREES
EKG P-R INTERVAL: 172 MS
EKG Q-T INTERVAL: 406 MS
EKG QRS DURATION: 102 MS
EKG QTC CALCULATION (BAZETT): 483 MS
EKG R AXIS: 49 DEGREES
EKG T AXIS: 25 DEGREES
EKG VENTRICULAR RATE: 85 BPM
EOSINOPHIL # BLD: 0.07 K/UL (ref 0.05–0.5)
EOSINOPHILS RELATIVE PERCENT: 1 % (ref 0–6)
ERYTHROCYTE [DISTWIDTH] IN BLOOD BY AUTOMATED COUNT: 15.6 % (ref 11.5–15)
GFR, ESTIMATED: >90 ML/MIN/1.73M2
GLUCOSE SERPL-MCNC: 81 MG/DL (ref 74–99)
HCT VFR BLD AUTO: 41.8 % (ref 37–54)
HGB BLD-MCNC: 13.6 G/DL (ref 12.5–16.5)
IMM GRANULOCYTES # BLD AUTO: 0.09 K/UL (ref 0–0.58)
IMM GRANULOCYTES NFR BLD: 1 % (ref 0–5)
LYMPHOCYTES NFR BLD: 1.93 K/UL (ref 1.5–4)
LYMPHOCYTES RELATIVE PERCENT: 19 % (ref 20–42)
MAGNESIUM SERPL-MCNC: 2 MG/DL (ref 1.6–2.6)
MCH RBC QN AUTO: 31.8 PG (ref 26–35)
MCHC RBC AUTO-ENTMCNC: 32.5 G/DL (ref 32–34.5)
MCV RBC AUTO: 97.7 FL (ref 80–99.9)
MONOCYTES NFR BLD: 1.55 K/UL (ref 0.1–0.95)
MONOCYTES NFR BLD: 15 % (ref 2–12)
NEUTROPHILS NFR BLD: 64 % (ref 43–80)
NEUTS SEG NFR BLD: 6.71 K/UL (ref 1.8–7.3)
PLATELET # BLD AUTO: 262 K/UL (ref 130–450)
PMV BLD AUTO: 10.5 FL (ref 7–12)
POTASSIUM SERPL-SCNC: 3.4 MMOL/L (ref 3.5–5)
PROT SERPL-MCNC: 6.4 G/DL (ref 6.4–8.3)
RBC # BLD AUTO: 4.28 M/UL (ref 3.8–5.8)
RBC # BLD: NORMAL 10*6/UL
SODIUM SERPL-SCNC: 134 MMOL/L (ref 132–146)
WBC OTHER # BLD: 10.4 K/UL (ref 4.5–11.5)

## 2024-08-12 PROCEDURE — 80053 COMPREHEN METABOLIC PANEL: CPT

## 2024-08-12 PROCEDURE — G0378 HOSPITAL OBSERVATION PER HR: HCPCS

## 2024-08-12 PROCEDURE — 6360000002 HC RX W HCPCS: Performed by: STUDENT IN AN ORGANIZED HEALTH CARE EDUCATION/TRAINING PROGRAM

## 2024-08-12 PROCEDURE — 6370000000 HC RX 637 (ALT 250 FOR IP): Performed by: INTERNAL MEDICINE

## 2024-08-12 PROCEDURE — 6370000000 HC RX 637 (ALT 250 FOR IP): Performed by: STUDENT IN AN ORGANIZED HEALTH CARE EDUCATION/TRAINING PROGRAM

## 2024-08-12 PROCEDURE — 93010 ELECTROCARDIOGRAM REPORT: CPT | Performed by: INTERNAL MEDICINE

## 2024-08-12 PROCEDURE — 83735 ASSAY OF MAGNESIUM: CPT

## 2024-08-12 PROCEDURE — 94640 AIRWAY INHALATION TREATMENT: CPT

## 2024-08-12 PROCEDURE — 99233 SBSQ HOSP IP/OBS HIGH 50: CPT | Performed by: INTERNAL MEDICINE

## 2024-08-12 PROCEDURE — 2580000003 HC RX 258: Performed by: STUDENT IN AN ORGANIZED HEALTH CARE EDUCATION/TRAINING PROGRAM

## 2024-08-12 PROCEDURE — 85025 COMPLETE CBC W/AUTO DIFF WBC: CPT

## 2024-08-12 RX ORDER — CHLORDIAZEPOXIDE HYDROCHLORIDE 25 MG/1
25 CAPSULE, GELATIN COATED ORAL ONCE
Status: COMPLETED | OUTPATIENT
Start: 2024-08-12 | End: 2024-08-12

## 2024-08-12 RX ORDER — POTASSIUM CHLORIDE 20 MEQ/1
40 TABLET, EXTENDED RELEASE ORAL ONCE
Status: DISCONTINUED | OUTPATIENT
Start: 2024-08-12 | End: 2024-08-12

## 2024-08-12 RX ORDER — PREDNISONE 20 MG/1
40 TABLET ORAL DAILY
Status: DISCONTINUED | OUTPATIENT
Start: 2024-08-12 | End: 2024-08-13 | Stop reason: HOSPADM

## 2024-08-12 RX ORDER — CHLORDIAZEPOXIDE HYDROCHLORIDE 25 MG/1
25 CAPSULE, GELATIN COATED ORAL 3 TIMES DAILY
Status: DISCONTINUED | OUTPATIENT
Start: 2024-08-12 | End: 2024-08-13 | Stop reason: HOSPADM

## 2024-08-12 RX ADMIN — DOXYCYCLINE HYCLATE 100 MG: 100 CAPSULE ORAL at 09:25

## 2024-08-12 RX ADMIN — LORAZEPAM 2 MG: 1 TABLET ORAL at 00:55

## 2024-08-12 RX ADMIN — LISINOPRIL 2.5 MG: 2.5 TABLET ORAL at 09:25

## 2024-08-12 RX ADMIN — AMOXICILLIN AND CLAVULANATE POTASSIUM 1 TABLET: 875; 125 TABLET, FILM COATED ORAL at 09:35

## 2024-08-12 RX ADMIN — CHLORDIAZEPOXIDE HYDROCHLORIDE 25 MG: 25 CAPSULE ORAL at 20:28

## 2024-08-12 RX ADMIN — ARFORMOTEROL TARTRATE 15 MCG: 15 SOLUTION RESPIRATORY (INHALATION) at 07:45

## 2024-08-12 RX ADMIN — DOXYCYCLINE HYCLATE 100 MG: 100 CAPSULE ORAL at 20:28

## 2024-08-12 RX ADMIN — BUDESONIDE 250 MCG: 0.25 SUSPENSION RESPIRATORY (INHALATION) at 07:45

## 2024-08-12 RX ADMIN — CHLORDIAZEPOXIDE HYDROCHLORIDE 25 MG: 25 CAPSULE ORAL at 09:56

## 2024-08-12 RX ADMIN — PREDNISONE 40 MG: 20 TABLET ORAL at 09:35

## 2024-08-12 RX ADMIN — CHLORDIAZEPOXIDE HYDROCHLORIDE 25 MG: 25 CAPSULE ORAL at 13:42

## 2024-08-12 RX ADMIN — ROSUVASTATIN CALCIUM 10 MG: 10 TABLET, FILM COATED ORAL at 20:28

## 2024-08-12 RX ADMIN — POTASSIUM CHLORIDE 40 MEQ: 1500 TABLET, EXTENDED RELEASE ORAL at 05:44

## 2024-08-12 RX ADMIN — AMOXICILLIN AND CLAVULANATE POTASSIUM 1 TABLET: 875; 125 TABLET, FILM COATED ORAL at 20:28

## 2024-08-12 RX ADMIN — BUDESONIDE 250 MCG: 0.25 SUSPENSION RESPIRATORY (INHALATION) at 20:37

## 2024-08-12 RX ADMIN — RIVAROXABAN 20 MG: 20 TABLET, FILM COATED ORAL at 09:35

## 2024-08-12 RX ADMIN — Medication 100 MG: at 09:25

## 2024-08-12 RX ADMIN — SODIUM CHLORIDE, PRESERVATIVE FREE 10 ML: 5 INJECTION INTRAVENOUS at 09:25

## 2024-08-12 RX ADMIN — ARFORMOTEROL TARTRATE 15 MCG: 15 SOLUTION RESPIRATORY (INHALATION) at 20:37

## 2024-08-12 RX ADMIN — SODIUM CHLORIDE, PRESERVATIVE FREE 10 ML: 5 INJECTION INTRAVENOUS at 20:28

## 2024-08-12 RX ADMIN — PANTOPRAZOLE SODIUM 40 MG: 40 TABLET, DELAYED RELEASE ORAL at 05:44

## 2024-08-12 RX ADMIN — LORAZEPAM 2 MG: 1 TABLET ORAL at 03:56

## 2024-08-12 ASSESSMENT — PAIN SCALES - GENERAL: PAINLEVEL_OUTOF10: 3

## 2024-08-12 ASSESSMENT — PAIN DESCRIPTION - LOCATION: LOCATION: GENERALIZED

## 2024-08-12 NOTE — CARE COORDINATION
[facility] Note    Name: Red Arredondo  Date: 8/12/2024    No chief complaint on file.      Peer Support met with patient.  [x] Support and education provided  [] Resources provided   [x] Treatment referral: Back to [facility]  [x] Other: Left Peer contact info  [] Patient declined peer recovery services     Referred By:  Elmira (LEVI)    Notes: Peer met with patient who does confirm that he would like to return to [facility] upon discharge, which is where he was prior to his admission. Peer contacted Hammad, from [facility], who informs PEER that patient was sent here d/t low potassium, however does confirm patient CAN return there once they have received updated labs on him displaying improvement. Hammad also confirmed that they will provide transportation for patient at time of discharge. Peer provided support and encouragement, and left patient with [facility] contact information. Please feel free to contact Peer for any further needs.    To fax updated labs to facility: [phone]    Liaison, Hammad Pemberton, [phone], can be contacted with any questions in regards to referral status, and also to coordinate transportation when patient is medically cleared for discharge.    Signed: Neva Antunez, 8/12/2024      [phone]

## 2024-08-12 NOTE — PROGRESS NOTES
Spoke with Dr. Gordon regarding Potassium ordered it was given with PRN scale, ok to discontinue,   Need to order one time dose of librium due to 50mg discontinued  before I could give it.

## 2024-08-12 NOTE — PROGRESS NOTES
[facility] Hospitalist Progress Note    Admitting Date and Time: 8/10/2024  3:20 AM  Admit Dx: Acute exacerbation of chronic obstructive pulmonary disease (COPD) ([facility]) [J44.1]    Subjective:  Patient is being followed for Acute exacerbation of chronic obstructive pulmonary disease (COPD) ([facility]) [J44.1]     No acute events overnight    ROS: denies fever, chills, cp, sob, n/v, HA unless stated above.      chlordiazePOXIDE  25 mg Oral TID    predniSONE  40 mg Oral Daily    nicotine  1 patch TransDERmal Daily    amoxicillin-clavulanate  1 tablet Oral 2 times per day    doxycycline hyclate  100 mg Oral 2 times per day    lisinopril  2.5 mg Oral Daily    pantoprazole  40 mg Oral QAM AC    rivaroxaban  20 mg Oral Daily with breakfast    rosuvastatin  10 mg Oral Nightly    sodium chloride flush  5-40 mL IntraVENous 2 times per day    thiamine  100 mg Oral Daily    budesonide  0.25 mg Nebulization BID RT    And    arformoterol tartrate  15 mcg Nebulization BID RT     potassium chloride, 40 mEq, PRN   Or  potassium alternative oral replacement, 40 mEq, PRN   Or  potassium chloride, 10 mEq, PRN  magnesium sulfate, 2,000 mg, PRN  ondansetron, 4 mg, Q8H PRN   Or  ondansetron, 4 mg, Q6H PRN  polyethylene glycol, 17 g, Daily PRN  acetaminophen, 650 mg, Q6H PRN   Or  acetaminophen, 650 mg, Q6H PRN  ipratropium 0.5 mg-albuterol 2.5 mg, 1 Dose, Q4H PRN  sodium chloride flush, 5-40 mL, PRN  sodium chloride, , PRN  LORazepam, 1 mg, Q1H PRN   Or  LORazepam, 1 mg, Q1H PRN   Or  LORazepam, 2 mg, Q1H PRN   Or  LORazepam, 2 mg, Q1H PRN   Or  LORazepam, 3 mg, Q1H PRN   Or  LORazepam, 3 mg, Q1H PRN   Or  LORazepam, 4 mg, Q1H PRN   Or  LORazepam, 4 mg, Q1H PRN  albuterol, 2.5 mg, 4x Daily PRN         Objective:    BP (!) 134/99   Pulse 88   Temp 97.3 °F (36.3 °C) (Oral)   Resp 16   Ht 1.778 m (5' 10\")   Wt 60.6 kg (133 lb 9.6 oz)   SpO2 92%   BMI 19.17 kg/m²     General Appearance: alert and oriented to person, place and time

## 2024-08-12 NOTE — CARE COORDINATION
Social Work/Discharge Planning:  Chart reviewed.  Referral made to Neva with [facility] ([facility]) and she confirmed patient can return to [facility] at discharge.  [facility] states he can return once they have received updated labs displaying improvement per Neva with [facility].  [facility] (fax: [phone]).  Hammad with [facility] (ph: [phone]) will assist with transportation at discharge.  Will continue to follow.  Electronically signed by HECTOR Xiao on 8/12/2024 at 10:59 AM

## 2024-08-13 VITALS
HEIGHT: 70 IN | BODY MASS INDEX: 20.77 KG/M2 | HEART RATE: 96 BPM | SYSTOLIC BLOOD PRESSURE: 114 MMHG | TEMPERATURE: 97.3 F | DIASTOLIC BLOOD PRESSURE: 79 MMHG | OXYGEN SATURATION: 99 % | WEIGHT: 145.06 LBS | RESPIRATION RATE: 18 BRPM

## 2024-08-13 LAB
ANION GAP SERPL CALCULATED.3IONS-SCNC: 10 MMOL/L (ref 7–16)
BASOPHILS # BLD: 0 K/UL (ref 0–0.2)
BASOPHILS NFR BLD: 0 % (ref 0–2)
BUN SERPL-MCNC: 9 MG/DL (ref 6–20)
CALCIUM SERPL-MCNC: 8.7 MG/DL (ref 8.6–10.2)
CHLORIDE SERPL-SCNC: 105 MMOL/L (ref 98–107)
CO2 SERPL-SCNC: 20 MMOL/L (ref 22–29)
CREAT SERPL-MCNC: 0.6 MG/DL (ref 0.7–1.2)
EOSINOPHIL # BLD: 0.25 K/UL (ref 0.05–0.5)
EOSINOPHILS RELATIVE PERCENT: 3 % (ref 0–6)
ERYTHROCYTE [DISTWIDTH] IN BLOOD BY AUTOMATED COUNT: 15 % (ref 11.5–15)
GFR, ESTIMATED: >90 ML/MIN/1.73M2
GLUCOSE SERPL-MCNC: 84 MG/DL (ref 74–99)
HCT VFR BLD AUTO: 40.9 % (ref 37–54)
HGB BLD-MCNC: 13.6 G/DL (ref 12.5–16.5)
LYMPHOCYTES NFR BLD: 1.55 K/UL (ref 1.5–4)
LYMPHOCYTES RELATIVE PERCENT: 17 % (ref 20–42)
MCH RBC QN AUTO: 32.2 PG (ref 26–35)
MCHC RBC AUTO-ENTMCNC: 33.3 G/DL (ref 32–34.5)
MCV RBC AUTO: 96.9 FL (ref 80–99.9)
METAMYELOCYTES ABSOLUTE COUNT: 0.08 K/UL (ref 0–0.12)
METAMYELOCYTES: 1 % (ref 0–1)
MONOCYTES NFR BLD: 0.98 K/UL (ref 0.1–0.95)
MONOCYTES NFR BLD: 10 % (ref 2–12)
MYELOCYTES ABSOLUTE COUNT: 0.08 K/UL
MYELOCYTES: 1 %
NEUTROPHILS NFR BLD: 69 % (ref 43–80)
NEUTS SEG NFR BLD: 6.46 K/UL (ref 1.8–7.3)
PLATELET # BLD AUTO: 300 K/UL (ref 130–450)
PMV BLD AUTO: 9.9 FL (ref 7–12)
POTASSIUM SERPL-SCNC: 3.4 MMOL/L (ref 3.5–5)
RBC # BLD AUTO: 4.22 M/UL (ref 3.8–5.8)
RBC # BLD: ABNORMAL 10*6/UL
SODIUM SERPL-SCNC: 135 MMOL/L (ref 132–146)
WBC OTHER # BLD: 9.4 K/UL (ref 4.5–11.5)

## 2024-08-13 PROCEDURE — 85025 COMPLETE CBC W/AUTO DIFF WBC: CPT

## 2024-08-13 PROCEDURE — 6370000000 HC RX 637 (ALT 250 FOR IP): Performed by: STUDENT IN AN ORGANIZED HEALTH CARE EDUCATION/TRAINING PROGRAM

## 2024-08-13 PROCEDURE — 99239 HOSP IP/OBS DSCHRG MGMT >30: CPT | Performed by: INTERNAL MEDICINE

## 2024-08-13 PROCEDURE — 80048 BASIC METABOLIC PNL TOTAL CA: CPT

## 2024-08-13 PROCEDURE — 1200000000 HC SEMI PRIVATE

## 2024-08-13 PROCEDURE — 6360000002 HC RX W HCPCS: Performed by: STUDENT IN AN ORGANIZED HEALTH CARE EDUCATION/TRAINING PROGRAM

## 2024-08-13 PROCEDURE — G0378 HOSPITAL OBSERVATION PER HR: HCPCS

## 2024-08-13 PROCEDURE — 36415 COLL VENOUS BLD VENIPUNCTURE: CPT

## 2024-08-13 PROCEDURE — 2580000003 HC RX 258: Performed by: STUDENT IN AN ORGANIZED HEALTH CARE EDUCATION/TRAINING PROGRAM

## 2024-08-13 PROCEDURE — 94640 AIRWAY INHALATION TREATMENT: CPT

## 2024-08-13 PROCEDURE — 6370000000 HC RX 637 (ALT 250 FOR IP): Performed by: INTERNAL MEDICINE

## 2024-08-13 RX ORDER — AMOXICILLIN AND CLAVULANATE POTASSIUM 875; 125 MG/1; MG/1
1 TABLET, FILM COATED ORAL EVERY 12 HOURS SCHEDULED
Qty: 9 TABLET | Refills: 0 | Status: SHIPPED | OUTPATIENT
Start: 2024-08-13 | End: 2024-08-18

## 2024-08-13 RX ORDER — CHLORDIAZEPOXIDE HYDROCHLORIDE 10 MG/1
CAPSULE, GELATIN COATED ORAL
Qty: 13 CAPSULE | Refills: 0 | Status: SHIPPED | OUTPATIENT
Start: 2024-08-13 | End: 2024-08-17

## 2024-08-13 RX ORDER — CHLORDIAZEPOXIDE HYDROCHLORIDE 10 MG/1
CAPSULE, GELATIN COATED ORAL
Qty: 13 CAPSULE | Refills: 0
Start: 2024-08-13 | End: 2024-08-13

## 2024-08-13 RX ORDER — POTASSIUM CHLORIDE 20 MEQ/1
40 TABLET, EXTENDED RELEASE ORAL ONCE
Status: DISCONTINUED | OUTPATIENT
Start: 2024-08-13 | End: 2024-08-13 | Stop reason: HOSPADM

## 2024-08-13 RX ORDER — CHLORDIAZEPOXIDE HYDROCHLORIDE 10 MG/1
CAPSULE, GELATIN COATED ORAL
Qty: 13 CAPSULE | Refills: 0 | Status: SHIPPED | OUTPATIENT
Start: 2024-08-13 | End: 2024-08-13

## 2024-08-13 RX ORDER — DOXYCYCLINE HYCLATE 100 MG/1
100 CAPSULE ORAL EVERY 12 HOURS SCHEDULED
Qty: 9 CAPSULE | Refills: 0 | Status: SHIPPED | OUTPATIENT
Start: 2024-08-13 | End: 2024-08-18

## 2024-08-13 RX ADMIN — CHLORDIAZEPOXIDE HYDROCHLORIDE 25 MG: 25 CAPSULE ORAL at 13:35

## 2024-08-13 RX ADMIN — AMOXICILLIN AND CLAVULANATE POTASSIUM 1 TABLET: 875; 125 TABLET, FILM COATED ORAL at 08:14

## 2024-08-13 RX ADMIN — CHLORDIAZEPOXIDE HYDROCHLORIDE 25 MG: 25 CAPSULE ORAL at 08:14

## 2024-08-13 RX ADMIN — SODIUM CHLORIDE, PRESERVATIVE FREE 10 ML: 5 INJECTION INTRAVENOUS at 08:18

## 2024-08-13 RX ADMIN — LISINOPRIL 2.5 MG: 2.5 TABLET ORAL at 08:14

## 2024-08-13 RX ADMIN — BUDESONIDE 250 MCG: 0.25 SUSPENSION RESPIRATORY (INHALATION) at 07:57

## 2024-08-13 RX ADMIN — PREDNISONE 40 MG: 20 TABLET ORAL at 08:14

## 2024-08-13 RX ADMIN — PANTOPRAZOLE SODIUM 40 MG: 40 TABLET, DELAYED RELEASE ORAL at 06:52

## 2024-08-13 RX ADMIN — LORAZEPAM 1 MG: 1 TABLET ORAL at 00:45

## 2024-08-13 RX ADMIN — ARFORMOTEROL TARTRATE 15 MCG: 15 SOLUTION RESPIRATORY (INHALATION) at 07:57

## 2024-08-13 RX ADMIN — Medication 100 MG: at 08:14

## 2024-08-13 RX ADMIN — RIVAROXABAN 20 MG: 20 TABLET, FILM COATED ORAL at 08:14

## 2024-08-13 RX ADMIN — DOXYCYCLINE HYCLATE 100 MG: 100 CAPSULE ORAL at 08:14

## 2024-08-13 RX ADMIN — LORAZEPAM 1 MG: 1 TABLET ORAL at 04:00

## 2024-08-13 RX ADMIN — POTASSIUM CHLORIDE 40 MEQ: 1500 TABLET, EXTENDED RELEASE ORAL at 08:13

## 2024-08-13 ASSESSMENT — PAIN SCALES - GENERAL
PAINLEVEL_OUTOF10: 3
PAINLEVEL_OUTOF10: 0

## 2024-08-13 NOTE — DISCHARGE INSTR - COC
Continuity of Care Form    Patient Name: Red Arredondo   :  1970  MRN:  [id number]    Admit date:  8/10/2024  Discharge date:  ***    Code Status Order: Full Code   Advance Directives:   Advance Care Flowsheet Documentation             Admitting Physician:  Joana Gordon DO  PCP: No primary care provider on file.    Discharging Nurse: ***  Discharging Hospital Unit/Room#: 0428/0428-A  Discharging Unit Phone Number: ***    Emergency Contact:   Extended Emergency Contact Information  Primary Emergency Contact: ArnulfoOksana  Address: [address] [place] [address] [facility]  Home Phone: [phone]  Relation: Parent    Past Surgical History:  Past Surgical History:   Procedure Laterality Date    CARDIAC CATHETERIZATION  06/15/2021    conclusion:\" patent left femoral vein, moderate to severe stenosis of left common iliac vein    CT GUIDED CHEST TUBE  2016    CT GUIDED CHEST TUBE 2016    CT GUIDED CHEST TUBE  2016    CT GUIDED CHEST TUBE 2016    CT GUIDED CHEST TUBE  2016    CT GUIDED CHEST TUBE 2016    CT GUIDED CHEST TUBE  2016    CT GUIDED CHEST TUBE 2016    SKIN BIOPSY         Immunization History:   Immunization History   Administered Date(s) Administered    COVID-19, MODERNA BLUE border, Primary or Immunocompromised, (age 12y+), IM, 100 mcg/0.5mL 2021, 2021, 2021       Active Problems:  Patient Active Problem List   Diagnosis Code    Acute pancreatitis K85.90    Hypertension I10    Non compliance with medical treatment Z91.199    Tobacco abuse Z72.0    Alcohol abuse F10.10    Mass of skin of left shoulder R22.32    Deep vein thrombosis (DVT) of left lower extremity ([facility]) I82.402    PAD (peripheral artery disease) ([facility]) I73.9    Acute deep vein thrombosis (DVT) of proximal vein of left lower extremity ([facility]) I82.4Y2    Leg swelling M79.89    Acute saddle pulmonary embolism ([facility]) I26.92    Chronic obstructive

## 2024-08-13 NOTE — CARE COORDINATION
Social Work/Discharge Planning:  Faxed updated labs to [facility] and await notification if they can accept patient today.  Will continue to follow.  Electronically signed by HECTOR Xiao on 8/13/2024 at 10:05 AM    Addendum:  [facility] can accept patient today and will arrange transport via [employer].  RN updated.  Electronically signed by HECTOR Xiao on 8/13/2024 at 1:17 PM

## 2024-08-13 NOTE — CARE COORDINATION
[facility] Note    Name: Red Arredondo  Date: 8/13/2024    No chief complaint on file.      Peer Support met with patient.  [] Support and education provided  [] Resources provided   [x] Treatment referral: [facility]  [x] Other:   [] Patient declined peer recovery services     Referred By: Elmira    Notes: Peer spoke to Hammad Pemberotn from [facility] who confirms patient acceptance back to their facility. Per liaison,  an e-script for required medication can be sent to [facility], in [place]. Once patient is fully cleared and prepared to discharge, please contact Hammad at [phone], and he will send a .     Peer suggested to provide the telephone number for patient nurse or nurses station on unit with Hammad, so that he can share it with the [employer] . This will ensure that they can contact staff and let them know when transport has arrived for patient.    UPDATE: Transportation will NOW arrive at NEW TIME of appoximately 4:00pm at the ED entrance.  will call [phone] when they arrive to collect patient.     Signed: Neva Antunez, 8/13/2024

## 2024-08-14 NOTE — PROGRESS NOTES
CLINICAL PHARMACY NOTE: MEDS TO BEDS    Total # of Prescriptions Filled: 2   The following medications were delivered to the patient:  Augmentin 875/125 mg   Doxycycline hyclate 100 mg       Additional Documentation:

## 2024-08-15 NOTE — DISCHARGE SUMMARY
tablet  Commonly known as: Xarelto  Take 1 tablet by mouth daily (with breakfast) Once 15mg bid for 21 days is done            STOP taking these medications      amLODIPine 10 MG tablet  Commonly known as: NORVASC     doxycycline hyclate 100 MG tablet  Commonly known as: VIBRA-TABS  Replaced by: doxycycline hyclate 100 MG capsule     FLUoxetine 20 MG capsule  Commonly known as: PROZAC     folic acid-pyridoxine-cyancobalamin 2.5-25-2 mg tablet 2.5-25-2 MG Tabs  Commonly known as: FOLTX     hydrOXYzine HCl 25 MG tablet  Commonly known as: ATARAX     metoprolol tartrate 25 MG tablet  Commonly known as: LOPRESSOR     omeprazole 20 MG delayed release capsule  Commonly known as: PRILOSEC     Symbicort 160-4.5 MCG/ACT Aero  Generic drug: budesonide-formoterol     Vivitrol 380 MG injection  Generic drug: naltrexone            ASK your doctor about these medications      dilTIAZem 240 MG extended release capsule  Commonly known as: CARDIZEM CD  Take 1 capsule by mouth daily     rosuvastatin 10 MG tablet  Commonly known as: CRESTOR               Where to Get Your Medications        These medications were sent to [address] -  [phone] - F [phone]  [address] [facility] [address]      Phone: [phone]   amoxicillin-clavulanate 875-125 MG per tablet  doxycycline hyclate 100 MG capsule       These medications were sent to [address] [phone] - F [phone]  [address] [place] [address]      Phone: [phone]   levoFLOXacin 750 MG tablet  nicotine 21 MG/24HR  predniSONE 20 MG tablet  thiamine 100 MG tablet       You can get these medications from any pharmacy    Bring a paper prescription for each of these medications  chlordiazePOXIDE 10 MG capsule           Note that 36 minutes was spent in preparing discharge papers, discussing discharge with patient, medication review,

## 2024-10-22 ENCOUNTER — APPOINTMENT (OUTPATIENT)
Dept: CT IMAGING | Age: 54
DRG: 720 | End: 2024-10-22
Payer: MEDICAID

## 2024-10-22 ENCOUNTER — APPOINTMENT (OUTPATIENT)
Dept: GENERAL RADIOLOGY | Age: 54
DRG: 720 | End: 2024-10-22
Payer: MEDICAID

## 2024-10-22 ENCOUNTER — HOSPITAL ENCOUNTER (INPATIENT)
Age: 54
LOS: 1 days | Discharge: HOME | DRG: 720 | End: 2024-10-23
Attending: STUDENT IN AN ORGANIZED HEALTH CARE EDUCATION/TRAINING PROGRAM | Admitting: STUDENT IN AN ORGANIZED HEALTH CARE EDUCATION/TRAINING PROGRAM
Payer: MEDICAID

## 2024-10-22 DIAGNOSIS — J18.9 PNEUMONIA OF RIGHT LOWER LOBE DUE TO INFECTIOUS ORGANISM: ICD-10-CM

## 2024-10-22 DIAGNOSIS — R79.89 ELEVATED LACTIC ACID LEVEL: ICD-10-CM

## 2024-10-22 DIAGNOSIS — F10.920 ACUTE ALCOHOLIC INTOXICATION WITHOUT COMPLICATION (HCC): ICD-10-CM

## 2024-10-22 DIAGNOSIS — J96.01 ACUTE RESPIRATORY FAILURE WITH HYPOXIA: Primary | ICD-10-CM

## 2024-10-22 PROBLEM — A41.9 SEPSIS (HCC): Status: ACTIVE | Noted: 2024-10-22

## 2024-10-22 PROBLEM — J44.1 COPD EXACERBATION (HCC): Status: ACTIVE | Noted: 2024-10-22

## 2024-10-22 LAB
ALBUMIN SERPL-MCNC: 4.5 G/DL (ref 3.4–5)
ALBUMIN/GLOB SERPL: 1.8 {RATIO} (ref 1.1–2.2)
ALP SERPL-CCNC: 83 U/L (ref 40–129)
ALT SERPL-CCNC: 20 U/L (ref 10–40)
ANION GAP SERPL CALCULATED.3IONS-SCNC: 20 MMOL/L (ref 9–17)
AST SERPL-CCNC: 20 U/L (ref 15–37)
BASOPHILS # BLD: 0.09 K/UL
BASOPHILS NFR BLD: 1 % (ref 0–1)
BILIRUB DIRECT SERPL-MCNC: <0.2 MG/DL (ref 0–0.3)
BILIRUB INDIRECT SERPL-MCNC: NORMAL MG/DL (ref 0–0.7)
BILIRUB SERPL-MCNC: <0.2 MG/DL (ref 0–1)
BUN SERPL-MCNC: 7 MG/DL (ref 7–20)
CALCIUM SERPL-MCNC: 9.2 MG/DL (ref 8.3–10.6)
CHLORIDE SERPL-SCNC: 101 MMOL/L (ref 99–110)
CO2 SERPL-SCNC: 19 MMOL/L (ref 21–32)
CREAT SERPL-MCNC: 1.3 MG/DL (ref 0.9–1.3)
EOSINOPHIL # BLD: 0.04 K/UL
EOSINOPHILS RELATIVE PERCENT: 0 % (ref 0–3)
ERYTHROCYTE [DISTWIDTH] IN BLOOD BY AUTOMATED COUNT: 13.8 % (ref 11.7–14.9)
ETHANOLAMINE SERPL-MCNC: 151 MG/DL (ref 0–0.08)
GFR, ESTIMATED: 57 ML/MIN/1.73M2
GLUCOSE SERPL-MCNC: 187 MG/DL (ref 74–99)
HCT VFR BLD AUTO: 46 % (ref 42–52)
HGB BLD-MCNC: 15.3 G/DL (ref 13.5–18)
IMM GRANULOCYTES # BLD AUTO: 0.05 K/UL
IMM GRANULOCYTES NFR BLD: 0 %
LACTATE BLDV-SCNC: 3.1 MMOL/L (ref 0.4–2)
LACTATE BLDV-SCNC: 3.3 MMOL/L (ref 0.4–2)
LIPASE SERPL-CCNC: 21 U/L (ref 13–60)
LYMPHOCYTES NFR BLD: 1.09 K/UL
LYMPHOCYTES RELATIVE PERCENT: 8 % (ref 24–44)
MCH RBC QN AUTO: 29 PG (ref 27–31)
MCHC RBC AUTO-ENTMCNC: 33.3 G/DL (ref 32–36)
MCV RBC AUTO: 87.1 FL (ref 78–100)
MONOCYTES NFR BLD: 1.05 K/UL
MONOCYTES NFR BLD: 8 % (ref 0–4)
NEUTROPHILS NFR BLD: 83 % (ref 36–66)
NEUTS SEG NFR BLD: 11.25 K/UL
PLATELET # BLD AUTO: 267 K/UL (ref 140–440)
PMV BLD AUTO: 10 FL (ref 7.5–11.1)
POTASSIUM SERPL-SCNC: 4.2 MMOL/L (ref 3.5–5.1)
PROT SERPL-MCNC: 7 G/DL (ref 6.4–8.2)
RBC # BLD AUTO: 5.28 M/UL (ref 4.6–6.2)
SODIUM SERPL-SCNC: 140 MMOL/L (ref 136–145)
TROPONIN I SERPL HS-MCNC: 17 NG/L (ref 0–22)
TROPONIN I SERPL HS-MCNC: 18 NG/L (ref 0–22)
WBC OTHER # BLD: 13.6 K/UL (ref 4–10.5)

## 2024-10-22 PROCEDURE — 84484 ASSAY OF TROPONIN QUANT: CPT

## 2024-10-22 PROCEDURE — 2580000003 HC RX 258: Performed by: INTERNAL MEDICINE

## 2024-10-22 PROCEDURE — 83605 ASSAY OF LACTIC ACID: CPT

## 2024-10-22 PROCEDURE — 96374 THER/PROPH/DIAG INJ IV PUSH: CPT

## 2024-10-22 PROCEDURE — 93005 ELECTROCARDIOGRAM TRACING: CPT | Performed by: STUDENT IN AN ORGANIZED HEALTH CARE EDUCATION/TRAINING PROGRAM

## 2024-10-22 PROCEDURE — 70450 CT HEAD/BRAIN W/O DYE: CPT

## 2024-10-22 PROCEDURE — 80053 COMPREHEN METABOLIC PANEL: CPT

## 2024-10-22 PROCEDURE — 6360000002 HC RX W HCPCS: Performed by: STUDENT IN AN ORGANIZED HEALTH CARE EDUCATION/TRAINING PROGRAM

## 2024-10-22 PROCEDURE — 99285 EMERGENCY DEPT VISIT HI MDM: CPT

## 2024-10-22 PROCEDURE — 2580000003 HC RX 258: Performed by: STUDENT IN AN ORGANIZED HEALTH CARE EDUCATION/TRAINING PROGRAM

## 2024-10-22 PROCEDURE — 6360000002 HC RX W HCPCS: Performed by: INTERNAL MEDICINE

## 2024-10-22 PROCEDURE — 82248 BILIRUBIN DIRECT: CPT

## 2024-10-22 PROCEDURE — G0480 DRUG TEST DEF 1-7 CLASSES: HCPCS

## 2024-10-22 PROCEDURE — 83690 ASSAY OF LIPASE: CPT

## 2024-10-22 PROCEDURE — 71045 X-RAY EXAM CHEST 1 VIEW: CPT

## 2024-10-22 PROCEDURE — 87040 BLOOD CULTURE FOR BACTERIA: CPT

## 2024-10-22 PROCEDURE — 85025 COMPLETE CBC W/AUTO DIFF WBC: CPT

## 2024-10-22 PROCEDURE — 96375 TX/PRO/DX INJ NEW DRUG ADDON: CPT

## 2024-10-22 PROCEDURE — 6360000004 HC RX CONTRAST MEDICATION: Performed by: INTERNAL MEDICINE

## 2024-10-22 PROCEDURE — 2140000000 HC CCU INTERMEDIATE R&B

## 2024-10-22 PROCEDURE — 71275 CT ANGIOGRAPHY CHEST: CPT

## 2024-10-22 RX ORDER — 0.9 % SODIUM CHLORIDE 0.9 %
1000 INTRAVENOUS SOLUTION INTRAVENOUS ONCE
Status: COMPLETED | OUTPATIENT
Start: 2024-10-22 | End: 2024-10-22

## 2024-10-22 RX ORDER — PHENOBARBITAL 32.4 MG/1
64.8 TABLET ORAL 2 TIMES DAILY
Status: DISCONTINUED | OUTPATIENT
Start: 2024-10-23 | End: 2024-10-23 | Stop reason: HOSPADM

## 2024-10-22 RX ORDER — PHENOBARBITAL 16.2 MG/1
16.2 TABLET ORAL 2 TIMES DAILY
Status: DISCONTINUED | OUTPATIENT
Start: 2024-10-24 | End: 2024-10-22 | Stop reason: SDUPTHER

## 2024-10-22 RX ORDER — SODIUM CHLORIDE 0.9 % (FLUSH) 0.9 %
5-40 SYRINGE (ML) INJECTION PRN
Status: DISCONTINUED | OUTPATIENT
Start: 2024-10-22 | End: 2024-10-23 | Stop reason: HOSPADM

## 2024-10-22 RX ORDER — NICOTINE 21 MG/24HR
1 PATCH, TRANSDERMAL 24 HOURS TRANSDERMAL DAILY
Status: DISCONTINUED | OUTPATIENT
Start: 2024-10-23 | End: 2024-10-23 | Stop reason: HOSPADM

## 2024-10-22 RX ORDER — SODIUM CHLORIDE 0.9 % (FLUSH) 0.9 %
5-40 SYRINGE (ML) INJECTION EVERY 12 HOURS SCHEDULED
Status: DISCONTINUED | OUTPATIENT
Start: 2024-10-22 | End: 2024-10-23 | Stop reason: HOSPADM

## 2024-10-22 RX ORDER — PHENOBARBITAL 16.2 MG/1
16.2 TABLET ORAL EVERY 6 HOURS PRN
Status: DISCONTINUED | OUTPATIENT
Start: 2024-10-24 | End: 2024-10-22

## 2024-10-22 RX ORDER — ROSUVASTATIN CALCIUM 5 MG/1
10 TABLET, COATED ORAL DAILY
Status: DISCONTINUED | OUTPATIENT
Start: 2024-10-23 | End: 2024-10-23 | Stop reason: HOSPADM

## 2024-10-22 RX ORDER — THIAMINE HYDROCHLORIDE 100 MG/ML
100 INJECTION, SOLUTION INTRAMUSCULAR; INTRAVENOUS DAILY
Status: DISCONTINUED | OUTPATIENT
Start: 2024-10-22 | End: 2024-10-23 | Stop reason: HOSPADM

## 2024-10-22 RX ORDER — ACETAMINOPHEN 650 MG/1
650 SUPPOSITORY RECTAL EVERY 6 HOURS PRN
Status: DISCONTINUED | OUTPATIENT
Start: 2024-10-22 | End: 2024-10-23 | Stop reason: HOSPADM

## 2024-10-22 RX ORDER — ALBUTEROL SULFATE 90 UG/1
2 INHALANT RESPIRATORY (INHALATION) 4 TIMES DAILY PRN
Status: DISCONTINUED | OUTPATIENT
Start: 2024-10-22 | End: 2024-10-23 | Stop reason: HOSPADM

## 2024-10-22 RX ORDER — PHENOBARBITAL 32.4 MG/1
32.4 TABLET ORAL EVERY 6 HOURS PRN
Status: DISCONTINUED | OUTPATIENT
Start: 2024-10-22 | End: 2024-10-22

## 2024-10-22 RX ORDER — IOPAMIDOL 755 MG/ML
75 INJECTION, SOLUTION INTRAVASCULAR
Status: COMPLETED | OUTPATIENT
Start: 2024-10-22 | End: 2024-10-22

## 2024-10-22 RX ORDER — PREDNISONE 20 MG/1
40 TABLET ORAL DAILY
Status: DISCONTINUED | OUTPATIENT
Start: 2024-10-23 | End: 2024-10-23 | Stop reason: HOSPADM

## 2024-10-22 RX ORDER — PHENOBARBITAL 32.4 MG/1
32.4 TABLET ORAL 2 TIMES DAILY
Status: DISCONTINUED | OUTPATIENT
Start: 2024-10-23 | End: 2024-10-22 | Stop reason: SDUPTHER

## 2024-10-22 RX ORDER — ACETAMINOPHEN 325 MG/1
650 TABLET ORAL EVERY 6 HOURS PRN
Status: DISCONTINUED | OUTPATIENT
Start: 2024-10-22 | End: 2024-10-23 | Stop reason: HOSPADM

## 2024-10-22 RX ORDER — SODIUM CHLORIDE 9 MG/ML
INJECTION, SOLUTION INTRAVENOUS PRN
Status: DISCONTINUED | OUTPATIENT
Start: 2024-10-22 | End: 2024-10-23 | Stop reason: HOSPADM

## 2024-10-22 RX ORDER — IPRATROPIUM BROMIDE AND ALBUTEROL SULFATE 2.5; .5 MG/3ML; MG/3ML
1 SOLUTION RESPIRATORY (INHALATION)
Status: DISCONTINUED | OUTPATIENT
Start: 2024-10-23 | End: 2024-10-23

## 2024-10-22 RX ORDER — PHENOBARBITAL 32.4 MG/1
32.4 TABLET ORAL 4 TIMES DAILY
Status: DISCONTINUED | OUTPATIENT
Start: 2024-10-22 | End: 2024-10-22 | Stop reason: SDUPTHER

## 2024-10-22 RX ORDER — PHENOBARBITAL 32.4 MG/1
32.4 TABLET ORAL 2 TIMES DAILY
Status: DISCONTINUED | OUTPATIENT
Start: 2024-10-24 | End: 2024-10-23 | Stop reason: HOSPADM

## 2024-10-22 RX ORDER — PHENOBARBITAL 32.4 MG/1
32.4 TABLET ORAL DAILY
Status: DISCONTINUED | OUTPATIENT
Start: 2024-10-25 | End: 2024-10-23 | Stop reason: HOSPADM

## 2024-10-22 RX ADMIN — SODIUM CHLORIDE 1000 ML: 9 INJECTION, SOLUTION INTRAVENOUS at 18:56

## 2024-10-22 RX ADMIN — PHENOBARBITAL SODIUM 650 MG: 130 INJECTION, SOLUTION INTRAMUSCULAR; INTRAVENOUS at 21:34

## 2024-10-22 RX ADMIN — SODIUM CHLORIDE 1000 ML: 9 INJECTION, SOLUTION INTRAVENOUS at 16:40

## 2024-10-22 RX ADMIN — SODIUM CHLORIDE 500 MG: 9 INJECTION, SOLUTION INTRAVENOUS at 17:27

## 2024-10-22 RX ADMIN — WATER 1000 MG: 1 INJECTION INTRAMUSCULAR; INTRAVENOUS; SUBCUTANEOUS at 17:01

## 2024-10-22 RX ADMIN — SODIUM CHLORIDE, PRESERVATIVE FREE 10 ML: 5 INJECTION INTRAVENOUS at 21:29

## 2024-10-22 RX ADMIN — IOPAMIDOL 75 ML: 755 INJECTION, SOLUTION INTRAVENOUS at 19:58

## 2024-10-22 ASSESSMENT — PAIN - FUNCTIONAL ASSESSMENT
PAIN_FUNCTIONAL_ASSESSMENT: NONE - DENIES PAIN
PAIN_FUNCTIONAL_ASSESSMENT: 0-10

## 2024-10-22 ASSESSMENT — PAIN SCALES - GENERAL: PAINLEVEL_OUTOF10: 0

## 2024-10-22 NOTE — ED NOTES
Called 3N to inform SBAR in.  
PT to CT.   
Patient provided a pitcher of water.  
Placed patient on 2L NC. Notified physician.   
Provided patient with urinal.  
Electronically signed by Samara Tse RN on 10/22/2024 at 7:06 PM

## 2024-10-22 NOTE — H&P
Financial Resource Strain (CARDIA)     Difficulty of Paying Living Expenses: Not hard at all   Food Insecurity: No Food Insecurity (8/10/2024)    Hunger Vital Sign     Worried About Running Out of Food in the Last Year: Never true     Ran Out of Food in the Last Year: Never true   Transportation Needs: No Transportation Needs (8/10/2024)    PRAPARE - Transportation     Lack of Transportation (Medical): No     Lack of Transportation (Non-Medical): No   Physical Activity: Inactive (5/10/2021)    Received from [employer]    Exercise Vital Sign     Days of Exercise per Week: 0 days     Minutes of Exercise per Session: 0 min   Stress: No Stress Concern Present (5/10/2021)    Received from [employer]    [employer] [place] [employer] - Occupational Stress Questionnaire     Feeling of Stress : Not at all    Received from [employer]    [employer]    Received from [employer]   Housing Stability: High Risk (8/10/2024)    Housing Stability Vital Sign     Unable to Pay for Housing in the Last Year: No     Number of Times Moved in the Last Year: 2     Homeless in the Last Year: No       Medications:   Medications:    sodium chloride  1,000 mL IntraVENous Once      Infusions:   PRN Meds:      Labs      CBC:   Recent Labs     10/22/24  1622   WBC 13.6*   HGB 15.3        BMP:    Recent Labs     10/22/24  1622      K 4.2      CO2 19*   BUN 7   CREATININE 1.3   GLUCOSE 187*     Hepatic:   Recent Labs     10/22/24  1622   AST 20   ALT 20   BILITOT <0.2   ALKPHOS 83     Lipids:   Lab Results   Component Value Date/Time    CHOL 114 10/19/2012 01:36 PM    HDL 63 10/19/2012 01:36 PM    TRIG 47 10/19/2012 01:36 PM     Hemoglobin A1C: No results found for: \"LABA1C\"  TSH: No results found for: \"TSH\"  Troponin:   Lab Results   Component Value Date/Time    TROPONINT 0.038 09/14/2021 06:10 AM    TROPONINT 0.036 09/14/2021 01:21 AM    TROPONINT 0.072

## 2024-10-22 NOTE — ED PROVIDER NOTES
Emergency Department Encounter    Patient: Red Arredondo  MRN: [id number]  : 1970  Date of Evaluation: 10/22/2024  ED Provider:  Jose J Gallegos DO    Triage Chief Complaint:   Drug Overdose (EMS report patient recently was in rehab and today patient has been drinking and possibly using narcotics.  Patient was unresponsive on EMS arrival with snoring respirations and pinpoint pupils.  Intranasal narcan given per EMS prior to arrival.  Patient responds to verbal stimuli on arrival.)    [place]:  Red Arredondo is a 54 y.o. male that presents presents initially via EMS for concerns for drug overdose.  His mother told EMS that she was concerned that patient was using narcotics.  He does have a history of alcohol abuse and himself did endorse daily alcohol use including today.  He declined any narcotic use or opiate use.    His mother did come in later and told me that he has had a cough and has been breathing rapidly at home as well.    MDM:    History from : Patient, Family patient's mother, and EMS    On arrival patient was tachycardic and hypoxic.  He was requiring 2 L nasal cannula oxygen.  He was sleepy but able to be aroused and answer to questions.  I felt that he was protecting his airway.  He is clearly intoxicated and does have an elevated alcohol level.  His EKG does not show any signs of acute ischemia.  He does have an elevated lactic acid and white blood cell count.  After seeing these results I did order additional IV fluids and broad-spectrum antibiotics along with blood cultures.  Possibility of sepsis since he does have concerns for right lower lobe pneumonia.  He is at higher risk for this given his alcohol abuse as well.    UDS is pending I do not feel he needs more Narcan at this time.  Troponin not elevated.  Will plan for admission for further management.  Tachycardia is improving with fluids.  Hemodynamically otherwise remained stable.      ED Course as of 10/22/24 1810   Tue Oct 22,

## 2024-10-23 VITALS
RESPIRATION RATE: 19 BRPM | WEIGHT: 141.54 LBS | SYSTOLIC BLOOD PRESSURE: 126 MMHG | TEMPERATURE: 98.5 F | HEART RATE: 90 BPM | OXYGEN SATURATION: 92 % | DIASTOLIC BLOOD PRESSURE: 88 MMHG | BODY MASS INDEX: 20.31 KG/M2

## 2024-10-23 LAB
ANION GAP SERPL CALCULATED.3IONS-SCNC: 12 MMOL/L (ref 9–17)
BASOPHILS # BLD: 0.06 K/UL
BASOPHILS NFR BLD: 1 % (ref 0–1)
BUN SERPL-MCNC: 11 MG/DL (ref 7–20)
CALCIUM SERPL-MCNC: 8.7 MG/DL (ref 8.3–10.6)
CHLORIDE SERPL-SCNC: 103 MMOL/L (ref 99–110)
CO2 SERPL-SCNC: 23 MMOL/L (ref 21–32)
CREAT SERPL-MCNC: 0.9 MG/DL (ref 0.9–1.3)
EOSINOPHIL # BLD: 0.02 K/UL
EOSINOPHILS RELATIVE PERCENT: 0 % (ref 0–3)
ERYTHROCYTE [DISTWIDTH] IN BLOOD BY AUTOMATED COUNT: 13.8 % (ref 11.7–14.9)
GFR, ESTIMATED: 86 ML/MIN/1.73M2
GLUCOSE SERPL-MCNC: 99 MG/DL (ref 74–99)
HCT VFR BLD AUTO: 40.3 % (ref 42–52)
HGB BLD-MCNC: 12.9 G/DL (ref 13.5–18)
IMM GRANULOCYTES # BLD AUTO: 0.04 K/UL
IMM GRANULOCYTES NFR BLD: 0 %
LACTATE BLDV-SCNC: 1.8 MMOL/L (ref 0.4–2)
LACTATE BLDV-SCNC: 3.6 MMOL/L (ref 0.4–2)
LYMPHOCYTES NFR BLD: 1.7 K/UL
LYMPHOCYTES RELATIVE PERCENT: 16 % (ref 24–44)
MCH RBC QN AUTO: 28.9 PG (ref 27–31)
MCHC RBC AUTO-ENTMCNC: 32 G/DL (ref 32–36)
MCV RBC AUTO: 90.2 FL (ref 78–100)
MONOCYTES NFR BLD: 1.47 K/UL
MONOCYTES NFR BLD: 14 % (ref 0–4)
NEUTROPHILS NFR BLD: 70 % (ref 36–66)
NEUTS SEG NFR BLD: 7.56 K/UL
PLATELET # BLD AUTO: 199 K/UL (ref 140–440)
PMV BLD AUTO: 10 FL (ref 7.5–11.1)
POTASSIUM SERPL-SCNC: 4.6 MMOL/L (ref 3.5–5.1)
PROCALCITONIN SERPL-MCNC: 0.07 NG/ML
RBC # BLD AUTO: 4.47 M/UL (ref 4.6–6.2)
SODIUM SERPL-SCNC: 138 MMOL/L (ref 136–145)
WBC OTHER # BLD: 10.9 K/UL (ref 4–10.5)

## 2024-10-23 PROCEDURE — 36415 COLL VENOUS BLD VENIPUNCTURE: CPT

## 2024-10-23 PROCEDURE — 84145 PROCALCITONIN (PCT): CPT

## 2024-10-23 PROCEDURE — 6360000002 HC RX W HCPCS: Performed by: STUDENT IN AN ORGANIZED HEALTH CARE EDUCATION/TRAINING PROGRAM

## 2024-10-23 PROCEDURE — 6370000000 HC RX 637 (ALT 250 FOR IP): Performed by: INTERNAL MEDICINE

## 2024-10-23 PROCEDURE — 2580000003 HC RX 258: Performed by: INTERNAL MEDICINE

## 2024-10-23 PROCEDURE — 85025 COMPLETE CBC W/AUTO DIFF WBC: CPT

## 2024-10-23 PROCEDURE — 2580000003 HC RX 258: Performed by: STUDENT IN AN ORGANIZED HEALTH CARE EDUCATION/TRAINING PROGRAM

## 2024-10-23 PROCEDURE — 6360000002 HC RX W HCPCS: Performed by: INTERNAL MEDICINE

## 2024-10-23 PROCEDURE — 80048 BASIC METABOLIC PNL TOTAL CA: CPT

## 2024-10-23 PROCEDURE — 83605 ASSAY OF LACTIC ACID: CPT

## 2024-10-23 RX ORDER — SODIUM CHLORIDE, SODIUM LACTATE, POTASSIUM CHLORIDE, AND CALCIUM CHLORIDE .6; .31; .03; .02 G/100ML; G/100ML; G/100ML; G/100ML
1000 INJECTION, SOLUTION INTRAVENOUS ONCE
Status: COMPLETED | OUTPATIENT
Start: 2024-10-23 | End: 2024-10-23

## 2024-10-23 RX ORDER — LEVOFLOXACIN 750 MG/1
750 TABLET, FILM COATED ORAL DAILY
Qty: 7 TABLET | Refills: 0 | Status: SHIPPED | OUTPATIENT
Start: 2024-10-23 | End: 2024-10-30

## 2024-10-23 RX ORDER — PREDNISONE 20 MG/1
40 TABLET ORAL DAILY
Qty: 8 TABLET | Refills: 0 | Status: SHIPPED | OUTPATIENT
Start: 2024-10-24 | End: 2024-10-28

## 2024-10-23 RX ORDER — IPRATROPIUM BROMIDE AND ALBUTEROL SULFATE 2.5; .5 MG/3ML; MG/3ML
1 SOLUTION RESPIRATORY (INHALATION)
Status: DISCONTINUED | OUTPATIENT
Start: 2024-10-23 | End: 2024-10-23

## 2024-10-23 RX ORDER — SODIUM CHLORIDE, SODIUM LACTATE, POTASSIUM CHLORIDE, CALCIUM CHLORIDE 600; 310; 30; 20 MG/100ML; MG/100ML; MG/100ML; MG/100ML
INJECTION, SOLUTION INTRAVENOUS CONTINUOUS
Status: DISCONTINUED | OUTPATIENT
Start: 2024-10-23 | End: 2024-10-23 | Stop reason: HOSPADM

## 2024-10-23 RX ORDER — IPRATROPIUM BROMIDE AND ALBUTEROL SULFATE 2.5; .5 MG/3ML; MG/3ML
1 SOLUTION RESPIRATORY (INHALATION) EVERY 4 HOURS PRN
Status: DISCONTINUED | OUTPATIENT
Start: 2024-10-23 | End: 2024-10-23 | Stop reason: HOSPADM

## 2024-10-23 RX ORDER — IPRATROPIUM BROMIDE AND ALBUTEROL SULFATE 2.5; .5 MG/3ML; MG/3ML
1 SOLUTION RESPIRATORY (INHALATION) EVERY 4 HOURS PRN
Status: DISCONTINUED | OUTPATIENT
Start: 2024-10-23 | End: 2024-10-23

## 2024-10-23 RX ADMIN — SODIUM CHLORIDE, POTASSIUM CHLORIDE, SODIUM LACTATE AND CALCIUM CHLORIDE: 600; 310; 30; 20 INJECTION, SOLUTION INTRAVENOUS at 11:03

## 2024-10-23 RX ADMIN — PREDNISONE 40 MG: 20 TABLET ORAL at 09:38

## 2024-10-23 RX ADMIN — PHENOBARBITAL 64.8 MG: 32.4 TABLET ORAL at 09:39

## 2024-10-23 RX ADMIN — RIVAROXABAN 20 MG: 20 TABLET, FILM COATED ORAL at 09:38

## 2024-10-23 RX ADMIN — SODIUM CHLORIDE, POTASSIUM CHLORIDE, SODIUM LACTATE AND CALCIUM CHLORIDE 1000 ML: 600; 310; 30; 20 INJECTION, SOLUTION INTRAVENOUS at 09:33

## 2024-10-23 RX ADMIN — THIAMINE HYDROCHLORIDE 100 MG: 100 INJECTION, SOLUTION INTRAMUSCULAR; INTRAVENOUS at 09:38

## 2024-10-23 RX ADMIN — SODIUM CHLORIDE, PRESERVATIVE FREE 10 ML: 5 INJECTION INTRAVENOUS at 09:39

## 2024-10-23 RX ADMIN — ROSUVASTATIN CALCIUM 10 MG: 5 TABLET, COATED ORAL at 09:38

## 2024-10-23 RX ADMIN — PIPERACILLIN AND TAZOBACTAM 3375 MG: 3; .375 INJECTION, POWDER, LYOPHILIZED, FOR SOLUTION INTRAVENOUS at 09:41

## 2024-10-23 NOTE — PROGRESS NOTES
Outpatient Pharmacy Progress Note for Meds-to-Beds    Total number of Prescriptions Filled: 2    Additional Documentation:  Medication(s) were delivered to the patient's room prior to discharge      Thank you for letting us serve your patients.  [address] [address]    Phone: [phone]    Fax: [phone]

## 2024-10-23 NOTE — DISCHARGE INSTRUCTIONS
(smoking) [email or web address] 800-QUIT-NOW ([phone])    12-Step Education:    Please attend a 12-Step meeting each day (such as AA or NA).  We recommend you make it a goal to attend 90 meetings in 90 days.  There are online meetings and phone meetings available as well.    You can ask for a temporary sponsor at the first meeting.  Contact your sponsor daily so you can work the 12 Steps together.    Read AA's The Big Book. You can get a copy at any meeting.    Many 12-step programs have free apps to be downloaded onto your smartphone or tablet.    Exercise helps to decrease cravings, therefore exercise as tolerated.     Practice using “HALT\" by making sure you are not getting too Hungry, Angry, Lonely, or Tired.     Avoid people, places, and things that trigger you to use.    Helpful phone numbers:    Free Crisis Hotline: 3-776-456-TALK (1-321.417.5255)    24/7 crisis line for [facility]: 397.953.9194    24-hour crisis text hotline: Text the word \"4hope\" to 839-851 for crisis support. Texting this number is free if you have [employer], [employer], [employer] or [employer].

## 2024-10-23 NOTE — PLAN OF CARE
Problem: Discharge Planning  Goal: Discharge to home or other facility with appropriate resources  10/23/2024 1106 by Helga Huitron, RN  Outcome: Progressing  10/23/2024 0244 by Qi Cotton, RN  Outcome: Progressing     Problem: Safety - Adult  Goal: Free from fall injury  Outcome: Progressing

## 2024-10-23 NOTE — PROGRESS NOTES
RRT Inhaler-Nebulizer Bronchodilator Protocol Note    There is a bronchodilator order in the chart from a provider indicating to follow the RT Bronchodilator Protocol and there is an “Initiate RT Inhaler-Nebulizer Bronchodilator Protocol” order as well (see protocol at bottom of note).    CXR Findings:  XR CHEST PORTABLE    Result Date: 10/22/2024  Right lung base atelectasis and or mild infiltrate. Trace bilateral pleural effusions. Electronically signed by RUBÉN GODWIN      The findings from the last RT Protocol Assessment were as follows:   History Pulmonary Disease: Chronic pulmonary disease  Respiratory Pattern: Regular pattern and RR 12-20 bpm  Breath Sounds: Clear breath sounds  Cough: Strong, spontaneous, non-productive  Indication for Bronchodilator Therapy:    Bronchodilator Assessment Score: 2    Aerosolized bronchodilator medication orders have been revised according to the RT Inhaler-Nebulizer Bronchodilator Protocol below.    Respiratory Therapist to perform RT Therapy Protocol Assessment initially then follow the protocol.  Repeat RT Therapy Protocol Assessment PRN for score 0-3 or on second treatment, BID, and PRN for scores above 3.    No Indications - adjust the frequency to every 6 hours PRN wheezing or bronchospasm, if no treatments needed after 48 hours then discontinue using Per Protocol order mode.     If indication present, adjust the RT bronchodilator orders based on the Bronchodilator Assessment Score as indicated below.  Use Inhaler orders unless patient has one or more of the following: on home nebulizer, not able to hold breath for 10 seconds, is not alert and oriented, cannot activate and use MDI correctly, or respiratory rate 25 breaths per minute or more, then use the equivalent nebulizer order(s) with same Frequency and PRN reasons based on the score.  If a patient is on this medication at home then do not decrease Frequency below that used at home.    0-3 - enter or revise RT

## 2024-10-23 NOTE — CARE COORDINATION
10/23/24 0922   Service Assessment   Patient Orientation Alert and Oriented   Cognition Alert   History Provided By Medical Record   Primary Caregiver Self   Support Systems Parent   Patient's Healthcare Decision Maker is: Legal Next of Kin   PCP Verified by CM No  (PCP list added)   Prior Functional Level Independent in ADLs/IADLs   Current Functional Level Independent in ADLs/IADLs   Can patient return to prior living arrangement Unknown at present   Ability to make needs known: Good   Family able to assist with home care needs: Yes   Would you like for me to discuss the discharge plan with any other family members/significant others, and if so, who? No   Financial Resources Medicaid   Community Resources None     Chart reviewed, screened for discharge planning.  Pt is from home with mother.  No discharge needs identified at this time.     Social work consult for consideration of rehab, Pt seen in ED.  Denied need for placement at that time.  CM to follow up.      PCP list and substance abuse resources provided in discharge instructions

## 2024-10-23 NOTE — CARE COORDINATION
CM - Room # TR-2 -Dr Woody -Spoke with Mr Arnulfo he was slow to respond but admitted he had been to \"a lot\" of rehabilitation places before ,  the most recent was \"[facility] in [place], [place]. He does not have a PCP recently -missed appointment on August 13 , 2024. When asked about his use of Drugs , he denied the use of any of them . He did admit to drinking \"4 Loco\" and \"Lots of beer\" denying any use of hard liquor.     Pt was also asked about his future plans after a hospital release , he explained he plans on \"going back home \" (lives with his mother) . He was asked again about any interest in rehabilitation --he was not interested , at least for now. CM will continue to follow pt needs.

## 2024-10-24 NOTE — DISCHARGE SUMMARY
V2.0  Discharge Summary    Name:  Red Arredondo /Age/Sex: 1970 (54 y.o. male)   Admit Date: 10/22/2024  Discharge Date: 10/23/24    MRN & CSN:  [id number] & [id number] Encounter Date and Time 10/23/24 2:14 PM EDT    Attending:  No att. providers found Discharging Provider: Garrick Ortiz MD       Hospital Course:     Brief HPI: Red Arredondo is a 54 y.o. male  with alcohol abuse and dependence, peripheral vascular disease, hypertension, neurofibromatosis, history of recurrent VTE, depression, anxiety, chronic tobacco dependence was brought to ED by EMS for concerns of drug overdose.  Patient is currently drowsy, opens his eyes after repeated stimulation, but not answering much questions.  Gets easily agitated, does not want to follow any commands.  Did not provide any information.  Does not appear to have any focal neurological deficits, noted that he was scratching his nose with right hand, was able to lift his upper extremity on command.  Was moving his bilateral lower extremities, did not lift them up against gravity even after multiple requests.  Patient admitted to drinking 4 Walter's daily.  Denied any drug use.     Brief Problem Based Course:     Alcohol intoxication  Patient endorses being passed out by drinking too much.  Day after had no acute signs of withdrawal.  Patient does not have a desire to stop drinking at this time.  Feels back to baseline and safe to go home.    Community-acquired pneumonia  Imaging findings with findings of pneumonia.  Did have recent mission for pneumonia.  Will provide patient with short course of oral antibiotics.    COPD exacerbation  Likely from binge drinking and alcohol.  Benefited quickly with breathing treatments and will give a short course of steroids.    Hypertension  Continue home meds        The patient expressed appropriate understanding of, and agreement with the discharge recommendations, medications, and plan.     Consults this

## 2024-10-25 LAB
EKG ATRIAL RATE: 153 BPM
EKG DIAGNOSIS: NORMAL
EKG Q-T INTERVAL: 336 MS
EKG QRS DURATION: 86 MS
EKG QTC CALCULATION (BAZETT): 536 MS
EKG R AXIS: 153 DEGREES
EKG T AXIS: 47 DEGREES
EKG VENTRICULAR RATE: 153 BPM

## 2024-10-25 PROCEDURE — 93010 ELECTROCARDIOGRAM REPORT: CPT | Performed by: INTERNAL MEDICINE

## 2024-10-27 LAB
MICROORGANISM SPEC CULT: NORMAL
MICROORGANISM SPEC CULT: NORMAL
SERVICE CMNT-IMP: NORMAL
SERVICE CMNT-IMP: NORMAL
SPECIMEN DESCRIPTION: NORMAL
SPECIMEN DESCRIPTION: NORMAL

## 2025-04-08 ENCOUNTER — HOSPITAL ENCOUNTER (EMERGENCY)
Age: 55
Discharge: HOME | End: 2025-04-09
Payer: MEDICAID

## 2025-04-08 DIAGNOSIS — I73.9 CLAUDICATION OF RIGHT LOWER EXTREMITY: Primary | ICD-10-CM

## 2025-04-08 PROCEDURE — 99283 EMERGENCY DEPT VISIT LOW MDM: CPT

## 2025-04-08 RX ORDER — HYDROCODONE BITARTRATE AND ACETAMINOPHEN 5; 325 MG/1; MG/1
1 TABLET ORAL ONCE
Status: COMPLETED | OUTPATIENT
Start: 2025-04-09 | End: 2025-04-09

## 2025-04-08 ASSESSMENT — PAIN DESCRIPTION - LOCATION: LOCATION: LEG

## 2025-04-08 ASSESSMENT — LIFESTYLE VARIABLES
HOW MANY STANDARD DRINKS CONTAINING ALCOHOL DO YOU HAVE ON A TYPICAL DAY: 10 OR MORE
HOW OFTEN DO YOU HAVE A DRINK CONTAINING ALCOHOL: 4 OR MORE TIMES A WEEK

## 2025-04-08 ASSESSMENT — PAIN DESCRIPTION - PAIN TYPE: TYPE: ACUTE PAIN

## 2025-04-08 ASSESSMENT — PAIN - FUNCTIONAL ASSESSMENT: PAIN_FUNCTIONAL_ASSESSMENT: 0-10

## 2025-04-08 ASSESSMENT — PAIN DESCRIPTION - ORIENTATION: ORIENTATION: RIGHT

## 2025-04-08 ASSESSMENT — PAIN SCALES - GENERAL: PAINLEVEL_OUTOF10: 9

## 2025-04-09 ENCOUNTER — HOSPITAL ENCOUNTER (EMERGENCY)
Age: 55
Discharge: LEFT BEFORE DISCHARGE | End: 2025-04-09
Attending: EMERGENCY MEDICINE
Payer: MEDICAID

## 2025-04-09 VITALS
WEIGHT: 140 LBS | RESPIRATION RATE: 18 BRPM | OXYGEN SATURATION: 92 % | SYSTOLIC BLOOD PRESSURE: 146 MMHG | DIASTOLIC BLOOD PRESSURE: 109 MMHG | TEMPERATURE: 97.8 F | BODY MASS INDEX: 20.04 KG/M2 | HEIGHT: 70 IN | HEART RATE: 96 BPM

## 2025-04-09 VITALS
OXYGEN SATURATION: 91 % | HEART RATE: 106 BPM | RESPIRATION RATE: 16 BRPM | DIASTOLIC BLOOD PRESSURE: 96 MMHG | SYSTOLIC BLOOD PRESSURE: 155 MMHG | TEMPERATURE: 98.2 F

## 2025-04-09 DIAGNOSIS — M79.604 PAIN OF RIGHT LOWER EXTREMITY: Primary | ICD-10-CM

## 2025-04-09 DIAGNOSIS — F10.930 ALCOHOL WITHDRAWAL SYNDROME WITHOUT COMPLICATION (HCC): ICD-10-CM

## 2025-04-09 LAB
ALBUMIN SERPL-MCNC: 4.3 G/DL (ref 3.4–5)
ALBUMIN/GLOB SERPL: 1.6 {RATIO} (ref 1.1–2.2)
ALP SERPL-CCNC: 120 U/L (ref 40–129)
ALT SERPL-CCNC: 18 U/L (ref 10–40)
ANION GAP SERPL CALCULATED.3IONS-SCNC: 15 MMOL/L (ref 9–17)
AST SERPL-CCNC: 43 U/L (ref 15–37)
BASOPHILS # BLD: 0.04 K/UL
BASOPHILS NFR BLD: 1 % (ref 0–1)
BILIRUB SERPL-MCNC: 1.3 MG/DL (ref 0–1)
BUN SERPL-MCNC: 15 MG/DL (ref 7–20)
CALCIUM SERPL-MCNC: 9.6 MG/DL (ref 8.3–10.6)
CHLORIDE SERPL-SCNC: 96 MMOL/L (ref 99–110)
CO2 SERPL-SCNC: 29 MMOL/L (ref 21–32)
CREAT SERPL-MCNC: 0.8 MG/DL (ref 0.9–1.3)
EOSINOPHIL # BLD: 0 K/UL
EOSINOPHILS RELATIVE PERCENT: 0 % (ref 0–3)
ERYTHROCYTE [DISTWIDTH] IN BLOOD BY AUTOMATED COUNT: 16.7 % (ref 11.7–14.9)
ETHANOLAMINE SERPL-MCNC: <10 MG/DL (ref 0–0.08)
GFR, ESTIMATED: >90 ML/MIN/1.73M2
GLUCOSE SERPL-MCNC: 119 MG/DL (ref 74–99)
HCT VFR BLD AUTO: 47.7 % (ref 42–52)
HGB BLD-MCNC: 15.9 G/DL (ref 13.5–18)
IMM GRANULOCYTES # BLD AUTO: 0.05 K/UL
IMM GRANULOCYTES NFR BLD: 1 %
INR PPP: 1.5
LYMPHOCYTES NFR BLD: 0.98 K/UL
LYMPHOCYTES RELATIVE PERCENT: 11 % (ref 24–44)
MCH RBC QN AUTO: 28.2 PG (ref 27–31)
MCHC RBC AUTO-ENTMCNC: 33.3 G/DL (ref 32–36)
MCV RBC AUTO: 84.7 FL (ref 78–100)
MONOCYTES NFR BLD: 0.64 K/UL
MONOCYTES NFR BLD: 7 % (ref 0–4)
NEUTROPHILS NFR BLD: 80 % (ref 36–66)
NEUTS SEG NFR BLD: 6.93 K/UL
PARTIAL THROMBOPLASTIN TIME: 31.3 SEC (ref 25.1–37.1)
PLATELET, FLUORESCENCE: 103 K/UL (ref 140–440)
PMV BLD AUTO: 11.3 FL (ref 7.5–11.1)
POTASSIUM SERPL-SCNC: 4 MMOL/L (ref 3.5–5.1)
PROT SERPL-MCNC: 7 G/DL (ref 6.4–8.2)
PROTHROMBIN TIME: 18.3 SEC (ref 11.7–14.5)
RBC # BLD AUTO: 5.63 M/UL (ref 4.6–6.2)
SODIUM SERPL-SCNC: 140 MMOL/L (ref 136–145)
WBC OTHER # BLD: 8.6 K/UL (ref 4–10.5)

## 2025-04-09 PROCEDURE — 85610 PROTHROMBIN TIME: CPT

## 2025-04-09 PROCEDURE — 6370000000 HC RX 637 (ALT 250 FOR IP): Performed by: NURSE PRACTITIONER

## 2025-04-09 PROCEDURE — G0480 DRUG TEST DEF 1-7 CLASSES: HCPCS

## 2025-04-09 PROCEDURE — 85730 THROMBOPLASTIN TIME PARTIAL: CPT

## 2025-04-09 PROCEDURE — 80053 COMPREHEN METABOLIC PANEL: CPT

## 2025-04-09 PROCEDURE — 99283 EMERGENCY DEPT VISIT LOW MDM: CPT

## 2025-04-09 PROCEDURE — 85025 COMPLETE CBC W/AUTO DIFF WBC: CPT

## 2025-04-09 RX ADMIN — HYDROCODONE BITARTRATE AND ACETAMINOPHEN 1 TABLET: 5; 325 TABLET ORAL at 00:07

## 2025-04-09 ASSESSMENT — PAIN DESCRIPTION - ORIENTATION
ORIENTATION: RIGHT
ORIENTATION: RIGHT;LEFT

## 2025-04-09 ASSESSMENT — PAIN SCALES - GENERAL
PAINLEVEL_OUTOF10: 5
PAINLEVEL_OUTOF10: 10

## 2025-04-09 ASSESSMENT — PAIN DESCRIPTION - LOCATION
LOCATION: LEG
LOCATION: GENERALIZED

## 2025-04-09 ASSESSMENT — PAIN DESCRIPTION - DESCRIPTORS
DESCRIPTORS: ACHING
DESCRIPTORS: ACHING

## 2025-04-09 ASSESSMENT — PAIN DESCRIPTION - PAIN TYPE: TYPE: ACUTE PAIN

## 2025-04-09 ASSESSMENT — PAIN - FUNCTIONAL ASSESSMENT: PAIN_FUNCTIONAL_ASSESSMENT: 0-10

## 2025-04-09 NOTE — ED PROVIDER NOTES
[facility] EMERGENCY DEPARTMENT  EMERGENCY DEPARTMENT ENCOUNTER        Pt Name: Red Arredondo  MRN: [id number]  Birthdate 1970  Date of evaluation: 4/8/2025  Provider: TITUS NEGRETE CNP  PCP: No primary care provider on file.    JARRELL. I have evaluated this patient.        Triage CHIEF COMPLAINT       Chief Complaint   Patient presents with    Hypertension     Right leg pain         HISTORY OF PRESENT ILLNESS      Chief Complaint: Right leg pain    Red Arredondo is a 54 y.o. male who presents for evaluation of chronic right leg pain.  In the triage note it states the patient was concerned about hypertension but he was not concerned about his blood pressure at all.  He reports history of chronic blood clots in his right leg, reports he was living in [place] until recently and had seen a surgeon there who recommended surgery on the leg but he did not show up to the appointment.  He had called EMS this evening due to the pain but when they arrive refused transport.  Somehow this prompted police intervention and patient had a warrant, he was arrested and is currently in police custody.  He denies any change in pain recently.  He admits that he has not followed up with the physicians as directed.  He is not taking any medications.  He does have a history of chronic alcohol abuse and was in [place] for alcohol treatment.  Has not been taking any over-the-counter medications.  Denies any skin changes or wounds on the right foot.    Nursing Notes were all reviewed and agreed with or any disagreements were addressed in the HPI.    REVIEW OF SYSTEMS     Pertinent ROS as noted in HPI.      PAST MEDICAL HISTORY     Past Medical History:   Diagnosis Date    Acute pancreatitis 10/19/2012    admitted to [facility], but left AMA    Alcohol abuse     Depression     H/O angiography 06/15/2021    conclusion:\" patent left femoral vein, moderate to severe stenosis of left common iliac vein    H/O  MEDICATIONS:  Discharge Medication List as of 4/9/2025 12:05 AM                 (Please note that portions ofthis note were completed with a voice recognition program.  Efforts were made to edit the dictations but occasionally words are mis-transcribed.)    TITUS NEGRETE - CNP (electronically signed)              Ramonita Sidhu, TITUS - RAMSES  04/09/25 0028

## 2025-04-09 NOTE — ED NOTES
Risks of leaving AMA explained to patient at this time. Patient verbalized understanding. Patient advised to return to ER for any new or worsening symptoms. Patient signed AMA form and ambulated to exit with steady gait.

## 2025-04-09 NOTE — ED TRIAGE NOTES
Pt brought to ED by [employer], pt called Ems for hypertension and right leg pain. Pt had warrants and EMS informed SPD who brought pt in to ED to be seen. Pt reports pain of 9/10 for leg pain.

## 2025-04-09 NOTE — ED NOTES
09/05/20 0802   Patient Assessment/Suction   Level of Consciousness (AVPU) alert   Respiratory Effort Normal;Unlabored   Expansion/Accessory Muscles/Retractions no retractions;no use of accessory muscles   PRE-TX-O2   O2 Device (Oxygen Therapy) room air   SpO2 100 %   Pulse Oximetry Type Continuous   $ Pulse Oximetry - Multiple Charge Pulse Oximetry - Multiple   Pulse 72   Resp 11   /67   Respiratory Evaluation   $ Care Plan Tech Time 15 min      Pt verbalized understanding of follow up and discharge information. Pt discharged with law enforcement

## 2025-04-09 NOTE — ED PROVIDER NOTES
This patient was seen by the medical student working with me who discussed the case with me and orders were placed for a CBC, CMP, ethanol level and coags as well as a venous duplex of the left lower extremity and a CTA of the abdominal aorta with bilateral runoff with contrast..  However, the patient eloped prior to my evaluation.  He signed out AMA with the nurse but I did not have the opportunity to discuss AMA precautions with the patient as he left while I was with a critical patient.    The medical student states that the patient is here with complaints of right leg claudication with a history of peripheral vascular disease, left leg swelling with a history of DVT and alcohol withdrawal.  He has been noncompliant with his Xarelto.  No further information is obtained as I did not personally evaluate this patient.     Labs Reviewed   CBC WITH AUTO DIFFERENTIAL - Abnormal; Notable for the following components:       Result Value    RDW 16.7 (*)     MPV 11.3 (*)     Platelet, Fluorescence 103 (*)     Neutrophils % 80 (*)     Lymphocytes % 11 (*)     Monocytes % 7 (*)     Immature Granulocytes % 1 (*)     All other components within normal limits   COMPREHENSIVE METABOLIC PANEL - Abnormal; Notable for the following components:    Chloride 96 (*)     Glucose 119 (*)     Creatinine 0.8 (*)     Total Bilirubin 1.3 (*)     AST 43 (*)     All other components within normal limits   PROTIME-INR - Abnormal; Notable for the following components:    Protime 18.3 (*)     All other components within normal limits   APTT   ETHANOL          Carmen Luther MD  04/09/25 2522

## 2025-08-28 ENCOUNTER — APPOINTMENT (OUTPATIENT)
Dept: CT IMAGING | Age: 55
End: 2025-08-28
Payer: MEDICAID

## 2025-08-28 ENCOUNTER — APPOINTMENT (OUTPATIENT)
Dept: GENERAL RADIOLOGY | Age: 55
End: 2025-08-28
Payer: MEDICAID

## 2025-08-28 ENCOUNTER — HOSPITAL ENCOUNTER (EMERGENCY)
Age: 55
Discharge: ANOTHER HOSPITAL | End: 2025-08-28
Attending: EMERGENCY MEDICINE
Payer: MEDICAID

## 2025-08-28 VITALS
TEMPERATURE: 97.4 F | SYSTOLIC BLOOD PRESSURE: 159 MMHG | RESPIRATION RATE: 18 BRPM | BODY MASS INDEX: 20.09 KG/M2 | DIASTOLIC BLOOD PRESSURE: 109 MMHG | WEIGHT: 140 LBS | HEART RATE: 94 BPM | OXYGEN SATURATION: 96 %

## 2025-08-28 DIAGNOSIS — D73.5 SPLENIC INFARCT: ICD-10-CM

## 2025-08-28 DIAGNOSIS — I99.8 PAIN OF RIGHT LOWER EXTREMITY DUE TO ISCHEMIA: ICD-10-CM

## 2025-08-28 DIAGNOSIS — A41.9 SEVERE SEPSIS (HCC): ICD-10-CM

## 2025-08-28 DIAGNOSIS — I70.221 CRITICAL LIMB ISCHEMIA OF RIGHT LOWER EXTREMITY (HCC): Primary | ICD-10-CM

## 2025-08-28 DIAGNOSIS — M79.604 PAIN OF RIGHT LOWER EXTREMITY DUE TO ISCHEMIA: ICD-10-CM

## 2025-08-28 DIAGNOSIS — I74.5 ILIAC ARTERY OCCLUSION, RIGHT (HCC): ICD-10-CM

## 2025-08-28 DIAGNOSIS — K66.8 PNEUMOPERITONEUM: ICD-10-CM

## 2025-08-28 DIAGNOSIS — R65.10 SIRS (SYSTEMIC INFLAMMATORY RESPONSE SYNDROME) (HCC): ICD-10-CM

## 2025-08-28 DIAGNOSIS — R65.20 SEVERE SEPSIS (HCC): ICD-10-CM

## 2025-08-28 LAB
ALBUMIN SERPL-MCNC: 3.9 G/DL (ref 3.4–5)
ALBUMIN/GLOB SERPL: 1.9 {RATIO} (ref 1.1–2.2)
ALP SERPL-CCNC: 100 U/L (ref 40–129)
ALT SERPL-CCNC: 10 U/L (ref 10–40)
ANION GAP SERPL CALCULATED.3IONS-SCNC: 17 MMOL/L (ref 9–17)
AST SERPL-CCNC: 14 U/L (ref 15–37)
BASOPHILS # BLD: 0.05 K/UL
BASOPHILS NFR BLD: 0 % (ref 0–1)
BILIRUB SERPL-MCNC: 0.8 MG/DL (ref 0–1)
BUN SERPL-MCNC: 14 MG/DL (ref 7–20)
CALCIUM SERPL-MCNC: 9.7 MG/DL (ref 8.3–10.6)
CHLORIDE SERPL-SCNC: 95 MMOL/L (ref 99–110)
CK SERPL-CCNC: 23 U/L (ref 26–192)
CO2 SERPL-SCNC: 24 MMOL/L (ref 21–32)
CREAT SERPL-MCNC: 0.7 MG/DL (ref 0.9–1.3)
EKG ATRIAL RATE: 87 BPM
EKG DIAGNOSIS: NORMAL
EKG P AXIS: 87 DEGREES
EKG P-R INTERVAL: 140 MS
EKG Q-T INTERVAL: 386 MS
EKG QRS DURATION: 92 MS
EKG QTC CALCULATION (BAZETT): 464 MS
EKG R AXIS: 39 DEGREES
EKG T AXIS: 64 DEGREES
EKG VENTRICULAR RATE: 87 BPM
EOSINOPHIL # BLD: 0.05 K/UL
EOSINOPHILS RELATIVE PERCENT: 0 % (ref 0–3)
ERYTHROCYTE [DISTWIDTH] IN BLOOD BY AUTOMATED COUNT: 15.5 % (ref 11.7–14.9)
GFR, ESTIMATED: >90 ML/MIN/1.73M2
GLUCOSE SERPL-MCNC: 150 MG/DL (ref 74–99)
HCT VFR BLD AUTO: 38.4 % (ref 42–52)
HGB BLD-MCNC: 12.4 G/DL (ref 13.5–18)
IMM GRANULOCYTES # BLD AUTO: 0.08 K/UL
IMM GRANULOCYTES NFR BLD: 0 %
INR PPP: 1
LACTATE BLDV-SCNC: 2.3 MMOL/L (ref 0.4–2)
LIPASE SERPL-CCNC: 46 U/L (ref 13–60)
LYMPHOCYTES NFR BLD: 1.11 K/UL
LYMPHOCYTES RELATIVE PERCENT: 5 % (ref 24–44)
MCH RBC QN AUTO: 28.2 PG (ref 27–31)
MCHC RBC AUTO-ENTMCNC: 32.3 G/DL (ref 32–36)
MCV RBC AUTO: 87.3 FL (ref 78–100)
MONOCYTES NFR BLD: 1.23 K/UL
MONOCYTES NFR BLD: 6 % (ref 0–5)
NEUTROPHILS NFR BLD: 89 % (ref 36–66)
NEUTS SEG NFR BLD: 19.36 K/UL
PLATELET # BLD AUTO: 360 K/UL (ref 140–440)
PMV BLD AUTO: 9.9 FL (ref 7.5–11.1)
POTASSIUM SERPL-SCNC: 3.6 MMOL/L (ref 3.5–5.1)
PROT SERPL-MCNC: 6 G/DL (ref 6.4–8.2)
PROTHROMBIN TIME: 13.6 SEC (ref 11.7–14.5)
RBC # BLD AUTO: 4.4 M/UL (ref 4.6–6.2)
SODIUM SERPL-SCNC: 136 MMOL/L (ref 136–145)
TROPONIN I SERPL HS-MCNC: 9 NG/L (ref 0–22)
WBC OTHER # BLD: 21.9 K/UL (ref 4–10.5)

## 2025-08-28 PROCEDURE — 99285 EMERGENCY DEPT VISIT HI MDM: CPT

## 2025-08-28 PROCEDURE — 93005 ELECTROCARDIOGRAM TRACING: CPT | Performed by: PHYSICIAN ASSISTANT

## 2025-08-28 PROCEDURE — 71045 X-RAY EXAM CHEST 1 VIEW: CPT

## 2025-08-28 PROCEDURE — 96365 THER/PROPH/DIAG IV INF INIT: CPT

## 2025-08-28 PROCEDURE — 6360000002 HC RX W HCPCS: Performed by: PHYSICIAN ASSISTANT

## 2025-08-28 PROCEDURE — 85610 PROTHROMBIN TIME: CPT

## 2025-08-28 PROCEDURE — 2500000003 HC RX 250 WO HCPCS: Performed by: EMERGENCY MEDICINE

## 2025-08-28 PROCEDURE — 96376 TX/PRO/DX INJ SAME DRUG ADON: CPT

## 2025-08-28 PROCEDURE — 84484 ASSAY OF TROPONIN QUANT: CPT

## 2025-08-28 PROCEDURE — 80053 COMPREHEN METABOLIC PANEL: CPT

## 2025-08-28 PROCEDURE — 93010 ELECTROCARDIOGRAM REPORT: CPT | Performed by: INTERNAL MEDICINE

## 2025-08-28 PROCEDURE — 6360000002 HC RX W HCPCS: Performed by: EMERGENCY MEDICINE

## 2025-08-28 PROCEDURE — 85025 COMPLETE CBC W/AUTO DIFF WBC: CPT

## 2025-08-28 PROCEDURE — 82550 ASSAY OF CK (CPK): CPT

## 2025-08-28 PROCEDURE — 96375 TX/PRO/DX INJ NEW DRUG ADDON: CPT

## 2025-08-28 PROCEDURE — 83605 ASSAY OF LACTIC ACID: CPT

## 2025-08-28 PROCEDURE — 2580000003 HC RX 258: Performed by: PHYSICIAN ASSISTANT

## 2025-08-28 PROCEDURE — 6360000004 HC RX CONTRAST MEDICATION: Performed by: PHYSICIAN ASSISTANT

## 2025-08-28 PROCEDURE — 75635 CT ANGIO ABDOMINAL ARTERIES: CPT

## 2025-08-28 PROCEDURE — 87040 BLOOD CULTURE FOR BACTERIA: CPT

## 2025-08-28 PROCEDURE — 83690 ASSAY OF LIPASE: CPT

## 2025-08-28 RX ORDER — HEPARIN SODIUM 1000 [USP'U]/ML
40 INJECTION, SOLUTION INTRAVENOUS; SUBCUTANEOUS PRN
Status: DISCONTINUED | OUTPATIENT
Start: 2025-08-28 | End: 2025-08-28 | Stop reason: HOSPADM

## 2025-08-28 RX ORDER — HEPARIN SODIUM 10000 [USP'U]/100ML
5-30 INJECTION, SOLUTION INTRAVENOUS CONTINUOUS
Status: DISCONTINUED | OUTPATIENT
Start: 2025-08-28 | End: 2025-08-28

## 2025-08-28 RX ORDER — HEPARIN SODIUM 1000 [USP'U]/ML
30 INJECTION, SOLUTION INTRAVENOUS; SUBCUTANEOUS PRN
Status: DISCONTINUED | OUTPATIENT
Start: 2025-08-28 | End: 2025-08-28

## 2025-08-28 RX ORDER — IOPAMIDOL 755 MG/ML
75 INJECTION, SOLUTION INTRAVASCULAR
Status: COMPLETED | OUTPATIENT
Start: 2025-08-28 | End: 2025-08-28

## 2025-08-28 RX ORDER — HEPARIN SODIUM 1000 [USP'U]/ML
60 INJECTION, SOLUTION INTRAVENOUS; SUBCUTANEOUS PRN
Status: DISCONTINUED | OUTPATIENT
Start: 2025-08-28 | End: 2025-08-28

## 2025-08-28 RX ORDER — HEPARIN SODIUM 1000 [USP'U]/ML
80 INJECTION, SOLUTION INTRAVENOUS; SUBCUTANEOUS ONCE
Status: COMPLETED | OUTPATIENT
Start: 2025-08-28 | End: 2025-08-28

## 2025-08-28 RX ORDER — HEPARIN SODIUM 10000 [USP'U]/100ML
5-30 INJECTION, SOLUTION INTRAVENOUS CONTINUOUS
Status: DISCONTINUED | OUTPATIENT
Start: 2025-08-28 | End: 2025-08-28 | Stop reason: HOSPADM

## 2025-08-28 RX ORDER — 0.9 % SODIUM CHLORIDE 0.9 %
30 INTRAVENOUS SOLUTION INTRAVENOUS ONCE
Status: COMPLETED | OUTPATIENT
Start: 2025-08-28 | End: 2025-08-28

## 2025-08-28 RX ORDER — HEPARIN SODIUM 1000 [USP'U]/ML
80 INJECTION, SOLUTION INTRAVENOUS; SUBCUTANEOUS PRN
Status: DISCONTINUED | OUTPATIENT
Start: 2025-08-28 | End: 2025-08-28 | Stop reason: HOSPADM

## 2025-08-28 RX ORDER — ONDANSETRON 2 MG/ML
4 INJECTION INTRAMUSCULAR; INTRAVENOUS ONCE
Status: COMPLETED | OUTPATIENT
Start: 2025-08-28 | End: 2025-08-28

## 2025-08-28 RX ORDER — HEPARIN SODIUM 1000 [USP'U]/ML
60 INJECTION, SOLUTION INTRAVENOUS; SUBCUTANEOUS ONCE
Status: DISCONTINUED | OUTPATIENT
Start: 2025-08-28 | End: 2025-08-28

## 2025-08-28 RX ADMIN — ONDANSETRON 4 MG: 2 INJECTION INTRAMUSCULAR; INTRAVENOUS at 14:39

## 2025-08-28 RX ADMIN — IOPAMIDOL 75 ML: 755 INJECTION, SOLUTION INTRAVENOUS at 15:32

## 2025-08-28 RX ADMIN — HYDROMORPHONE HYDROCHLORIDE 1 MG: 1 INJECTION, SOLUTION INTRAMUSCULAR; INTRAVENOUS; SUBCUTANEOUS at 14:38

## 2025-08-28 RX ADMIN — HEPARIN SODIUM 18 UNITS/KG/HR: 10000 INJECTION, SOLUTION INTRAVENOUS at 16:37

## 2025-08-28 RX ADMIN — HYDROMORPHONE HYDROCHLORIDE 0.5 MG: 1 INJECTION, SOLUTION INTRAMUSCULAR; INTRAVENOUS; SUBCUTANEOUS at 15:35

## 2025-08-28 RX ADMIN — HEPARIN SODIUM 5100 UNITS: 1000 INJECTION INTRAVENOUS; SUBCUTANEOUS at 16:33

## 2025-08-28 RX ADMIN — HYDROMORPHONE HYDROCHLORIDE 0.5 MG: 1 INJECTION, SOLUTION INTRAMUSCULAR; INTRAVENOUS; SUBCUTANEOUS at 16:45

## 2025-08-28 RX ADMIN — SODIUM CHLORIDE 1905 ML: 0.9 INJECTION, SOLUTION INTRAVENOUS at 14:42

## 2025-08-28 ASSESSMENT — PAIN DESCRIPTION - LOCATION
LOCATION: ABDOMEN
LOCATION: LEG;ABDOMEN;BACK
LOCATION: ABDOMEN;LEG

## 2025-08-28 ASSESSMENT — PAIN - FUNCTIONAL ASSESSMENT
PAIN_FUNCTIONAL_ASSESSMENT: 0-10

## 2025-08-28 ASSESSMENT — PAIN DESCRIPTION - ORIENTATION
ORIENTATION: UPPER
ORIENTATION: RIGHT

## 2025-08-28 ASSESSMENT — PAIN SCALES - GENERAL
PAINLEVEL_OUTOF10: 10
